# Patient Record
Sex: MALE | Race: WHITE | NOT HISPANIC OR LATINO | Employment: FULL TIME | URBAN - METROPOLITAN AREA
[De-identification: names, ages, dates, MRNs, and addresses within clinical notes are randomized per-mention and may not be internally consistent; named-entity substitution may affect disease eponyms.]

---

## 2018-05-09 ENCOUNTER — OFFICE VISIT (OUTPATIENT)
Dept: URGENT CARE | Facility: CLINIC | Age: 48
End: 2018-05-09
Payer: COMMERCIAL

## 2018-05-09 VITALS
DIASTOLIC BLOOD PRESSURE: 86 MMHG | HEART RATE: 71 BPM | OXYGEN SATURATION: 98 % | TEMPERATURE: 98 F | RESPIRATION RATE: 16 BRPM | SYSTOLIC BLOOD PRESSURE: 138 MMHG

## 2018-05-09 DIAGNOSIS — G89.29 CHRONIC PAIN OF LEFT KNEE: ICD-10-CM

## 2018-05-09 DIAGNOSIS — J30.89 SEASONAL ALLERGIC RHINITIS DUE TO OTHER ALLERGIC TRIGGER: ICD-10-CM

## 2018-05-09 DIAGNOSIS — F45.8 BRUXISM: Primary | ICD-10-CM

## 2018-05-09 DIAGNOSIS — M25.562 CHRONIC PAIN OF LEFT KNEE: ICD-10-CM

## 2018-05-09 PROCEDURE — 99212 OFFICE O/P EST SF 10 MIN: CPT | Mod: S$GLB,,, | Performed by: FAMILY MEDICINE

## 2018-05-09 RX ORDER — CYCLOBENZAPRINE HCL 5 MG
5 TABLET ORAL 3 TIMES DAILY PRN
Qty: 21 TABLET | Refills: 0 | Status: SHIPPED | OUTPATIENT
Start: 2018-05-09 | End: 2018-05-19

## 2018-05-09 NOTE — PATIENT INSTRUCTIONS
Follow up with ortho if the left knee pain is consistent   Do the exercise PT taught you for your knee   Rest, Ice, NSAIDS (like motrin) especially after exercising   Can continue the motrin for inflammation in the knee and jaw pain   Get your blood work (orders already placed by your PCP) and follow up with your PCP- call for an appointment.        TMJ Syndrome  The temporomandibular joint (TMJ) is the joint that connects your lower jaw to your head. You can feel it in front of your ears when you open and close your mouth. TMJ disorders involve chronic or recurrent pain in the joint. When treated, symptoms of TMJ disorders usually go away within a few months.  Causes  There is no widely agreed-on cause of TMJ disorders. They have been linked to injury, arthritis, chronic fatigue syndrome, and fibromyalgia. A definite connection has not been shown, though.  Symptoms  · Pain in the face, jaw, or neck  · Pain with jaw movement or chewing  · Locking or catching sensation of the jaw  · Clicking, popping, or grinding sounds with movement of the TMJ  · Headache  · Ear pain  Home care  Modest, nonsurgical treatments are a good first step toward relieving symptoms. Try the approaches described below.  · Rest the jaw by avoiding crunchy or hard-to-chew foods. Do not eat hard or sticky candies. Soft foods and liquids are easier on the jaw.  · Protect your jaw while yawning. If you need to yawn, put your fist under your chin to prevent your mouth from opening up too wide.  · To help relieve pain, try applying hot or cold packs to the painful area. Try both hot and cold to find out which works best for you. If you use hot packs (small towels soaked in hot water), be careful not to burn yourself.  · You may take acetaminophen or ibuprofen for pain, unless you were given a different pain medicine. (Note: If you have chronic liver or kidney disease or have ever had a stomach ulcer or gastrointestinal bleeding, talk with your  healthcare provider before using these medicines. Also talk to your provider if you are taking medicine to prevent blood clots.) Aspirin should never be given to anyone younger than 18 years of age who is ill with a viral infection or fever. It may cause severe liver or brain damage.  Reducing stress  If stress seems to be contributing to your symptoms, try to identify the sources of stress in your life. These arent always obvious. Common stressors include:  · Everyday hassles (which can add up), such as traffic jams, missed appointments, or car trouble  · Major life changes, both good (such as a new baby or job promotion) and bad (loss of job or loss of a loved one)  · Overload: The feeling that you have too many responsibilities and can't take care of everything at once  · Helplessness: Feeling like your problems are more than you can solve  When possible, do something about your sources of stress. See if you can avoid hassles, limit the amount of change in your life at one time, and take breaks when you feel overloaded.  Unfortunately, many stressful situations cannot be avoided. So learning how to manage stress better is very important. Getting regular exercise, eating nutritious, balanced meals, and getting adequate rest all help to make everyday stress more manageable. Certain techniques are also helpful: relaxation and breathing exercises, visualization, biofeedback, meditation, or simply taking some time out to clear your mind. For more information, talk with your healthcare provider.  Follow-up care  Follow up with your healthcare provider, or as advised. Further testing and additional treatment may be required. If changes to your lifestyle do not improve your symptoms, talk with your healthcare provider about other available therapies. These include bite guards for help with teeth grinding, stress management techniques, and more. If stress is an important factor and does not respond to the above simple  measures, talk to your doctor about a referral for stress management.  · If X-rays were done, they will be reviewed by a specialist. You will be notified of the results, especially if they affect treatment.  Call 911  Call emergency services right away if any of these occur:  · Trouble breathing or swallowing, wheezing  · Confusion  · Extreme drowsiness or trouble awakening  · Fainting or loss of consciousness  · Rapid heart rate  When to seek medical advice  Call your healthcare provider right away if any of these occur:  · Your face becomes swollen or red.  · Your pain worsens.  · You have increasing neck, mouth, tooth, or throat pain.  · You develop a fever of 100.4F (38°C) or higher  Date Last Reviewed: 7/30/2015  © 8731-2603 [employer]. [address] [place] [address], [place] [address]. All rights reserved. This information is not intended as a substitute for professional medical care. Always follow your healthcare professional's instructions.      For seasonal allergies, postnasal drip, etc  Symptomatic treatment: (consider the following unless you are allergic or cannot take the following suggestions)  Alternate Tylenol and Ibuprofen every 3 hrs for pain and fever control   For a sore throat try salt water gargles, honey/lemon water to soothe throat  Cepachol helps to numb the discomfort in throat  Cold-eeze helps to reduce the duration of Upper Respiratory Infection symptoms  Elderberry to reduce duration of URI symptoms  Nasal saline spray reduces inflammation and dryness  Warm face compresses/hot showers as often as you can to open sinuses   Vicks vapor rub at night  Flonase OTC or Nasacort OTC (nasal steroid)  Stay hydrated with increased water intake and simple foods like chicken noodle soup help hydrate and soothe the throat  Drink pedialyte, gatorade or propel.   Delsym helps with coughing at night  Zyrtec/Claritin during the day   Benadryl at night may help if allergy component   You can try  breathe right strips at night to help you breathe   A cool mist humidifier in bedroom may help with cough and relieve stuffy nose.   Zantac will help if there is reflux from the post nasal drip  REST ReSt Rest as much as you can =)    Sinus rinses DO NOT USE TAP WATER, if you must, water must be a rolling boil for 1 minute, let it cool, then use.  May use distilled water, or over the counter nasal saline rinses.  Vics vapor rub in shower to help open nasal passages.  May use nasal gel to keep passages moisturized.  May use Nasal saline sprays during the day for added relief of congestion.   For those who go to the gym, please do not use the sauna or steam room now to clear sinuses.    During pollen season, change shirt if you are outside for a while when you go in.  Also wash your face.  Do not touch your face with your hands.  Wash your hands often in general while ill, avoid face contact with hands.     Your symptoms will likely last 5-7 days, maybe longer depending on how it affects your body.  You are contagious 5-7, so minimize contact with others to reduce the spread to others and stay home from work or school as we discussed. Dehydration is preventable but is one of the main reasons why you will feel so badly.  Antibiotics are not needed unless a complication (such as Otitis Media, Bacterial sinus infection or pneumonia). Taking antibiotics for Flu/Cold is not supported by evidencebased medicine and can expose you to unnecessary side effects of the medication, such as anaphylaxis.   If you experience any:  Chest pain, shortness of breath, wheezing or difficulty breathing  Severe headache, face, neck or ear pain  New rash  Fever over 101.5º F (38.6 C) for more than three days  Confusion, behavior change or seizure  Severe weakness or dizziness  Go to ER

## 2018-05-09 NOTE — PROGRESS NOTES
"Subjective:       Patient ID: Saúl Perez is a 48 y.o. male.    Vitals:  oral temperature is 98.3 °F (36.8 °C). His blood pressure is 138/86 and his pulse is 71. His respiration is 16 and oxygen saturation is 98%.     Chief Complaint: Hoarse    Patient present to clinic with multiple complaints     c/o tongue pushing to roof of mouth, difficulty sleeping, "during the day feels like I never brush my teeth." He visited dentist 8 weeks ago. Clenching teeth at night.   Other symptoms that started hoarseness (not every day), night sweats (past couple night). He states he has read multiple Internet sites and is worried about cancer to the throat. Has hx of tobacco use and etoh use    Left knee pain "off and on", surgery 18 months ago MCL- repair       Illness   Episode onset: 3 weeks ago  Pertinent negatives include no headaches, sore throat, fever, chest pain, shortness of breath, abdominal pain, diarrhea, nausea, vomiting, joint pain or rash.     Review of Systems   Constitution: Negative for chills and fever.   HENT: Positive for hoarse voice (of and on). Negative for sore throat.    Eyes: Negative for blurred vision.   Cardiovascular: Negative for chest pain and dyspnea on exertion.   Respiratory: Negative for shortness of breath.    Skin: Negative for rash.   Musculoskeletal: Negative for back pain and joint pain.   Gastrointestinal: Negative for abdominal pain, diarrhea, nausea and vomiting.   Neurological: Negative for headaches.   Psychiatric/Behavioral: The patient is nervous/anxious.        Objective:      Physical Exam   Constitutional: He is oriented to person, place, and time. He appears well-developed and well-nourished. He is cooperative.  Non-toxic appearance. He does not appear ill. No distress.   HENT:   Head: Normocephalic and atraumatic.   Right Ear: Hearing, external ear and ear canal normal. A middle ear effusion is present.   Left Ear: Hearing, external ear and ear canal normal. A middle ear " effusion is present.   Nose: Nose normal. No mucosal edema, rhinorrhea or nasal deformity. No epistaxis.   Mouth/Throat: Uvula is midline, oropharynx is clear and moist and mucous membranes are normal. No trismus in the jaw. Normal dentition. No uvula swelling. No posterior oropharyngeal erythema.   Eyes: Conjunctivae and lids are normal. Right eye exhibits no discharge. Left eye exhibits no discharge. No scleral icterus. Right pupil is round and reactive. Left pupil is round and reactive.   Sclera clear bilat   Neck: Trachea normal, normal range of motion, full passive range of motion without pain and phonation normal. Neck supple.   Cardiovascular: Normal rate, regular rhythm, normal heart sounds, intact distal pulses and normal pulses.    Pulmonary/Chest: Effort normal and breath sounds normal. No respiratory distress.   Abdominal: Soft. Normal appearance and bowel sounds are normal. He exhibits no distension, no pulsatile midline mass and no mass. There is no tenderness.   Musculoskeletal: Normal range of motion. He exhibits no edema or deformity.        Left knee: He exhibits normal range of motion, no swelling, no effusion, no ecchymosis, no deformity, no laceration and no erythema. No tenderness found.   Neurological: He is alert and oriented to person, place, and time. He exhibits normal muscle tone. Coordination normal.   Skin: Skin is warm, dry and intact. He is not diaphoretic. No pallor.   Psychiatric: His speech is normal and behavior is normal. Judgment and thought content normal. His mood appears anxious. Cognition and memory are normal.   Nursing note and vitals reviewed.      Assessment:       1. Bruxism    2. Seasonal allergic rhinitis due to other allergic trigger    3. Chronic pain of left knee        Plan:         Bruxism  -     Ambulatory referral to ENT    Seasonal allergic rhinitis due to other allergic trigger  -     Ambulatory referral to ENT    Chronic pain of left knee    Other orders  -   "   cyclobenzaprine (FLEXERIL) 5 MG tablet; Take 1 tablet (5 mg total) by mouth 3 (three) times daily as needed for Muscle spasms.  Dispense: 21 tablet; Refill: 0    patient anxious about throat ca due to clearing his throat "excessively" and pressing his tongue to the roof of his mouth. He states he has hx tobacco use and ETOH- refer to ENT for this and TMJ/Jaw Clenching   NSAID for knee pain and jaw pain  Follow up with ortho if the left knee pain is consistent   Do the exercise PT taught you for your knee s/p MCL surgery   Rest, Ice, elevation especially after exercising for the knee  Get your blood work (orders already placed by your PCP) and follow up with your PCP- call for an appointment  CMP  Sodium   Date Value Ref Range Status   05/26/2017 143 136 - 145 mmol/L Final     Potassium   Date Value Ref Range Status   05/26/2017 4.7 3.5 - 5.1 mmol/L Final     Chloride   Date Value Ref Range Status   05/26/2017 106 95 - 110 mmol/L Final     CO2   Date Value Ref Range Status   05/26/2017 26 22 - 31 mmol/L Final     Glucose   Date Value Ref Range Status   05/26/2017 115 (H) 70 - 110 mg/dL Final     Comment:     The ADA recommends the following guidelines for fasting glucose:  Normal:       less than 100 mg/dL  Prediabetes:  100 mg/dL to 125 mg/dL  Diabetes:     126 mg/dL or higher       BUN, Bld   Date Value Ref Range Status   05/26/2017 13 9 - 21 mg/dL Final     Creatinine   Date Value Ref Range Status   05/26/2017 0.85 0.50 - 1.40 mg/dL Final     Calcium   Date Value Ref Range Status   05/26/2017 9.2 8.4 - 10.2 mg/dL Final     Total Protein   Date Value Ref Range Status   05/26/2017 6.8 6.0 - 8.4 g/dL Final     Albumin   Date Value Ref Range Status   05/26/2017 4.1 3.5 - 5.2 g/dL Final     Total Bilirubin   Date Value Ref Range Status   05/26/2017 0.5 0.2 - 1.3 mg/dL Final     Comment:     For infants and newborns, interpretation of results should be based  on gestational age, weight and in agreement with " clinical  observations.  Premature Infant recommended reference ranges:  Up to 24 hours.............<8.0 mg/dL  Up to 48 hours............<12.0 mg/dL  3-5 days..................<15.0 mg/dL  6-29 days.................<15.0 mg/dL       Alkaline Phosphatase   Date Value Ref Range Status   05/26/2017 60 38 - 145 U/L Final     AST ([place])   Date Value Ref Range Status   04/28/2016 29 17 - 59 U/L Final     AST   Date Value Ref Range Status   05/26/2017 22 17 - 59 U/L Final     ALT   Date Value Ref Range Status   05/26/2017 37 10 - 44 U/L Final     Anion Gap   Date Value Ref Range Status   05/26/2017 11 8 - 16 mmol/L Final     eGFR if    Date Value Ref Range Status   05/26/2017 >60 >60 mL/min/1.73 m^2 Final     eGFR if non    Date Value Ref Range Status   05/26/2017 >60 >60 mL/min/1.73 m^2 Final     Comment:     Calculation used to obtain the estimated glomerular filtration  rate (eGFR) is the CKD-EPI equation. Since race is unknown   in our information system, the eGFR values for   -American and Non--American patients are given   for each creatinine result.

## 2018-05-17 ENCOUNTER — OFFICE VISIT (OUTPATIENT)
Dept: OTOLARYNGOLOGY | Facility: CLINIC | Age: 48
End: 2018-05-17
Payer: COMMERCIAL

## 2018-05-17 VITALS
BODY MASS INDEX: 30.75 KG/M2 | HEIGHT: 72 IN | WEIGHT: 227.06 LBS | DIASTOLIC BLOOD PRESSURE: 84 MMHG | HEART RATE: 88 BPM | SYSTOLIC BLOOD PRESSURE: 130 MMHG

## 2018-05-17 DIAGNOSIS — F41.9 ANXIETY: ICD-10-CM

## 2018-05-17 DIAGNOSIS — Z87.891 HISTORY OF SMOKELESS TOBACCO USE: ICD-10-CM

## 2018-05-17 DIAGNOSIS — F45.8 BRUXISM: ICD-10-CM

## 2018-05-17 DIAGNOSIS — F10.20 UNCOMPLICATED ALCOHOL DEPENDENCE: ICD-10-CM

## 2018-05-17 DIAGNOSIS — R49.0 DYSPHONIA: ICD-10-CM

## 2018-05-17 DIAGNOSIS — R13.10 DYSPHAGIA, UNSPECIFIED TYPE: ICD-10-CM

## 2018-05-17 DIAGNOSIS — K21.9 LPRD (LARYNGOPHARYNGEAL REFLUX DISEASE): Primary | ICD-10-CM

## 2018-05-17 PROCEDURE — 31575 DIAGNOSTIC LARYNGOSCOPY: CPT | Mod: S$GLB,,, | Performed by: NURSE PRACTITIONER

## 2018-05-17 PROCEDURE — 99999 PR PBB SHADOW E&M-EST. PATIENT-LVL IV: CPT | Mod: PBBFAC,,, | Performed by: NURSE PRACTITIONER

## 2018-05-17 PROCEDURE — 99203 OFFICE O/P NEW LOW 30 MIN: CPT | Mod: 25,S$GLB,, | Performed by: NURSE PRACTITIONER

## 2018-05-17 RX ORDER — OMEPRAZOLE 40 MG/1
40 CAPSULE, DELAYED RELEASE ORAL
Qty: 30 CAPSULE | Refills: 11 | Status: SHIPPED | OUTPATIENT
Start: 2018-05-17 | End: 2019-06-17

## 2018-05-17 NOTE — LETTER
May 17, 2018      Juliet Moura NP  [address] [facility] [facility] [place] [address]           [place] [facility]  [address] [facility] [address] [place] [address]  Phone: [phone]  Fax: [phone]          Patient: Saúl Perez   MR Number: [id number]   YOB: 1970   Date of Visit: 5/17/2018       Dear Juliet Moura:    Thank you for referring Saúl Perez to me for evaluation. Attached you will find relevant portions of my assessment and plan of care.    If you have questions, please do not hesitate to call me. I look forward to following Saúl Perez along with you.    Sincerely,    Lore Cleveland NP    Enclosure  CC:  No Recipients    If you would like to receive this communication electronically, please contact [email or web address] or [phone] to request more information on [employer] access.    For providers and/or their staff who would like to refer a patient to [facility], please contact us through our one-stop-shop provider referral line, [facility], at [phone].    If you feel you have received this communication in error or would no longer like to receive these types of communications, please e-mail [email or web address]

## 2018-05-17 NOTE — PATIENT INSTRUCTIONS
"  How Acid Reflux Affects Your Throat    Do you have to clear your throat or cough often? Are you hoarse? Do you have trouble swallowing? If you have these or other throat symptoms, you may have acid reflux. This occurs when stomach acid flows back up and irritates your throat.  Why you have throat symptoms  There are muscles (esophageal sphincters) at both ends of the tube that carries food to your stomach (the esophagus). These muscles relax to let food pass. Then they tighten to keep stomach acid down. When the lower esophageal sphincter (LES) doesnt tighten enough, acid can flow back (reflux) from your stomach into your esophagus. This may cause heartburn. In some cases the upper esophageal sphincter (UES) also doesnt work well. Then acid can travel higher and enter your throat (pharynx). In many cases, this causes throat symptoms.  Common throat symptoms  · Need to clear your throat often  · Feeling like youre choking  · Long-term (chronic) cough  · Hoarseness  · Trouble swallowing  · Feel like you have a lump in your throat  · Sour or acid taste  · Sore throat that keeps coming back     LARYNGOPHARYNGEAL REFLUX  (SILENT OR ATYPICAL REFLUX)    If you have any of the following symptoms you may have laryngopharyngeal reflux (LPR):  hoarseness, thick or too much mucus, chronic throat pain/irritation, chronic throat clearing, chronic cough, especially cough that wake you up from sleep, chronic "postnasal drip" without the need to blow your nose.     Many people with LPR do not have symptoms of heartburn. Compared to the esophagus, the voice box and the back of the throat are significantly more sensitive to the effects of acid on surrounding tissue. Acid passing quickly through the esophagus does not have a chance to irritate the area for too long.  However acid that pools in the throat or voice box can cause prolonged irritation resulting in the symptoms of LPR.    The symptoms of LPR can consist of a dry cough " "and the sensation of something being stuck in the throat.  Some people will complain of heartburn while others may have intermittent hoarseness or loss of voice.  Another major symptom of LPR is "postnasal drip."  Patients are often told symptoms are due to abnormal nasal drainage or sinus infection; however this is rarely the cause of chronic throat irritation. For post nasal drip to cause the complaints described, signs and symptoms of an active nasal infection should be present.     Treatments for LPR include:  postural changes, weight reduction, diet modification, medication to reduce stomach acid and promote normal motility, and surgery to prevent reflux. Most patients will begin to notice some relief in her symptoms about 2 weeks after starting the medication; however it is generally recommended the medication should be continued for 2 months. If the symptoms completely resolve, the medication can then be tapered.  Some people will remain symptom free while others may have relapses which required treatment again.    Things you can do to prevent reflux include:  Do not smoke.  Smoking will cause reflux.  Avoid tight fitting clothes or belts around the waist.  Avoid eating at least 2 hours prior to bedtime.  In fact avoid eating your largest meal at night.  Weight loss.  For patient's with recent weight gain, shedding a few pounds is all that is required to improve reflux.  Avoid caffeine, cola beverages, citrus beverages, mints, alcoholic beverages, particularly at night, cheese, fried foods, spicy foods, eggs, and chocolate.  Sleep with the head of bed elevated at least 6 inches.    Recommendations:    Take Nexium / Prilosec (PPI) every morning on an empty stomach (30-60 minutes before eating) 40 MG.   At bedtime take Zantac (H2-blocker) 150-300 mg.    After 4-8 weeks, with significant symptomatic improvement, you may begin weaning your reflux medications down:  Nexium / Prilosec 40 mg --> to 20 mg " (over-the-counter strength)  Zantac 300 mg --> to 150 mg (over-the-counter stength)  See a Gastro doctor (GI) for refractory symptoms and continued management.

## 2018-06-05 PROBLEM — Z23 NEED FOR DIPHTHERIA-TETANUS-PERTUSSIS (TDAP) VACCINE: Status: ACTIVE | Noted: 2018-06-05

## 2018-09-03 ENCOUNTER — OFFICE VISIT (OUTPATIENT)
Dept: URGENT CARE | Facility: CLINIC | Age: 48
End: 2018-09-03
Payer: COMMERCIAL

## 2018-09-03 VITALS
SYSTOLIC BLOOD PRESSURE: 136 MMHG | DIASTOLIC BLOOD PRESSURE: 88 MMHG | HEART RATE: 83 BPM | RESPIRATION RATE: 14 BRPM | OXYGEN SATURATION: 97 % | TEMPERATURE: 99 F

## 2018-09-03 DIAGNOSIS — J01.00 ACUTE MAXILLARY SINUSITIS, RECURRENCE NOT SPECIFIED: Primary | ICD-10-CM

## 2018-09-03 PROCEDURE — 99214 OFFICE O/P EST MOD 30 MIN: CPT | Mod: S$GLB,,, | Performed by: FAMILY MEDICINE

## 2018-09-03 RX ORDER — AMOXICILLIN AND CLAVULANATE POTASSIUM 875; 125 MG/1; MG/1
1 TABLET, FILM COATED ORAL 2 TIMES DAILY
Qty: 20 TABLET | Refills: 0 | Status: SHIPPED | OUTPATIENT
Start: 2018-09-03 | End: 2018-09-13

## 2018-09-03 NOTE — PROGRESS NOTES
Subjective:       Patient ID: Saúl Perez is a 48 y.o. male.    Vitals:  oral temperature is 98.8 °F (37.1 °C). His blood pressure is 136/88 and his pulse is 83. His respiration is 14 and oxygen saturation is 97%.     Chief Complaint: Otalgia    Pt c/o bilateral ear congestion, started 10 days, right side tooth pain, productive cough, nasal congestion, post nasal drip,       URI    This is a new problem. The current episode started 1 to 4 weeks ago. The problem has been unchanged. Associated symptoms include congestion, coughing and ear pain. Pertinent negatives include no abdominal pain, chest pain, headaches, nausea, sore throat or wheezing. He has tried nothing for the symptoms.     Review of Systems   Constitution: Negative for chills, fever and malaise/fatigue.   HENT: Positive for congestion and ear pain. Negative for hoarse voice and sore throat.    Eyes: Negative for discharge and redness.   Cardiovascular: Negative for chest pain, dyspnea on exertion and leg swelling.   Respiratory: Positive for cough and sputum production. Negative for shortness of breath and wheezing.    Musculoskeletal: Negative for myalgias.   Gastrointestinal: Negative for abdominal pain and nausea.   Neurological: Negative for headaches.       Objective:      Physical Exam   Constitutional: He is oriented to person, place, and time. He appears well-developed and well-nourished. He is cooperative.  Non-toxic appearance. He does not appear ill. No distress.   HENT:   Head: Normocephalic and atraumatic.   Right Ear: Hearing, external ear and ear canal normal. A middle ear effusion is present.   Left Ear: Hearing, external ear and ear canal normal. A middle ear effusion is present.   Nose: No mucosal edema, rhinorrhea or nasal deformity. No epistaxis. Right sinus exhibits maxillary sinus tenderness. Right sinus exhibits no frontal sinus tenderness. Left sinus exhibits maxillary sinus tenderness. Left sinus exhibits no frontal sinus  tenderness.   Mouth/Throat: Uvula is midline, oropharynx is clear and moist and mucous membranes are normal. No trismus in the jaw. Normal dentition. No uvula swelling. No posterior oropharyngeal erythema.   Eyes: Conjunctivae and lids are normal. No scleral icterus.   Sclera clear bilat   Neck: Trachea normal, full passive range of motion without pain and phonation normal. Neck supple.   Cardiovascular: Normal rate, regular rhythm, normal heart sounds, intact distal pulses and normal pulses.   Pulmonary/Chest: Effort normal and breath sounds normal. No respiratory distress.   Abdominal: Normal appearance. He exhibits no distension. There is no tenderness.   Musculoskeletal: Normal range of motion. He exhibits no edema or deformity.   Neurological: He is alert and oriented to person, place, and time. He exhibits normal muscle tone. Coordination normal.   Skin: Skin is warm, dry and intact. He is not diaphoretic. No pallor.   Psychiatric: He has a normal mood and affect. His speech is normal and behavior is normal. Judgment and thought content normal. Cognition and memory are normal.   Nursing note and vitals reviewed.      Assessment:       1. Acute maxillary sinusitis, recurrence not specified        Plan:         Acute maxillary sinusitis, recurrence not specified    Other orders  -     amoxicillin-clavulanate 875-125mg (AUGMENTIN) 875-125 mg per tablet; Take 1 tablet by mouth 2 (two) times daily. for 10 days  Dispense: 20 tablet; Refill: 0

## 2018-09-06 ENCOUNTER — TELEPHONE (OUTPATIENT)
Dept: URGENT CARE | Facility: CLINIC | Age: 48
End: 2018-09-06

## 2019-02-28 ENCOUNTER — OFFICE VISIT (OUTPATIENT)
Dept: URGENT CARE | Facility: CLINIC | Age: 49
End: 2019-02-28
Payer: COMMERCIAL

## 2019-02-28 VITALS
DIASTOLIC BLOOD PRESSURE: 77 MMHG | BODY MASS INDEX: 31.42 KG/M2 | OXYGEN SATURATION: 97 % | HEART RATE: 89 BPM | SYSTOLIC BLOOD PRESSURE: 131 MMHG | TEMPERATURE: 97 F | HEIGHT: 72 IN | WEIGHT: 232 LBS

## 2019-02-28 DIAGNOSIS — M54.5 ACUTE BILATERAL LOW BACK PAIN, WITH SCIATICA PRESENCE UNSPECIFIED: Primary | ICD-10-CM

## 2019-02-28 PROCEDURE — 96372 THER/PROPH/DIAG INJ SC/IM: CPT | Mod: S$GLB,,, | Performed by: PHYSICIAN ASSISTANT

## 2019-02-28 PROCEDURE — 96372 PR INJECTION,THERAP/PROPH/DIAG2ST, IM OR SUBCUT: ICD-10-PCS | Mod: S$GLB,,, | Performed by: PHYSICIAN ASSISTANT

## 2019-02-28 PROCEDURE — 99214 OFFICE O/P EST MOD 30 MIN: CPT | Mod: 25,S$GLB,, | Performed by: PHYSICIAN ASSISTANT

## 2019-02-28 PROCEDURE — 99214 PR OFFICE/OUTPT VISIT, EST, LEVL IV, 30-39 MIN: ICD-10-PCS | Mod: 25,S$GLB,, | Performed by: PHYSICIAN ASSISTANT

## 2019-02-28 RX ORDER — MELOXICAM 15 MG/1
15 TABLET ORAL DAILY
Qty: 14 TABLET | Refills: 0 | Status: SHIPPED | OUTPATIENT
Start: 2019-02-28 | End: 2019-03-14

## 2019-02-28 RX ORDER — KETOROLAC TROMETHAMINE 30 MG/ML
30 INJECTION, SOLUTION INTRAMUSCULAR; INTRAVENOUS
Status: COMPLETED | OUTPATIENT
Start: 2019-02-28 | End: 2019-02-28

## 2019-02-28 RX ORDER — DEXAMETHASONE SODIUM PHOSPHATE 100 MG/10ML
10 INJECTION INTRAMUSCULAR; INTRAVENOUS
Status: COMPLETED | OUTPATIENT
Start: 2019-02-28 | End: 2019-02-28

## 2019-02-28 RX ORDER — KETOCONAZOLE 20 MG/ML
SHAMPOO, SUSPENSION TOPICAL
Refills: 5 | COMMUNITY
Start: 2019-01-15 | End: 2021-04-19

## 2019-02-28 RX ADMIN — KETOROLAC TROMETHAMINE 30 MG: 30 INJECTION, SOLUTION INTRAMUSCULAR; INTRAVENOUS at 09:02

## 2019-02-28 RX ADMIN — DEXAMETHASONE SODIUM PHOSPHATE 10 MG: 100 INJECTION INTRAMUSCULAR; INTRAVENOUS at 09:02

## 2019-02-28 NOTE — PATIENT INSTRUCTIONS
Back Pain (Acute or Chronic)    Back pain is one of the most common problems. The good news is that most people feel better in 1 to 2 weeks, and most of the rest in 1 to 2 months. Most people can remain active.  People experience and describe pain differently; not everyone is the same.  · The pain can be sharp, stabbing, shooting, aching, cramping or burning.  · Movement, standing, bending, lifting, sitting, or walking may worsen pain.  · It can be localized to one spot or area, or it can be more generalized.  · It can spread or radiate upwards, to the front, or go down your arms or legs (sciatica).  · It can cause muscle spasm.  Most of the time, mechanical problems with the muscles or spine cause the pain. Mechanical problems are usually caused by an injury to the muscles or ligaments. While illness can cause back pain, it is usually not caused by a serious illness. Mechanical problems include:   · Physical activity such as sports, exercise, work, or normal activity  · Overexertion, lifting, pushing, pulling incorrectly or too aggressively  · Sudden twisting, bending, or stretching from an accident, or accidental movement  · Poor posture  · Stretching or moving wrong, without noticing pain at the time  · Poor coordination, lack of regular exercise (check with your doctor about this)  · Spinal disc disease or arthritis  · Stress  Pain can also be related to pregnancy, or illness like appendicitis, bladder or kidney infections, pelvic infections, and many other things.  Acute back pain usually gets better in 1 to 2 weeks. Back pain related to disk disease, arthritis in the spinal joints or spinal stenosis (narrowing of the spinal canal) can become chronic and last for months or years.  Unless you had a physical injury (for example, a car accident or fall) X-rays are usually not needed for the initial evaluation of back pain. If pain continues and does not respond to medical treatment, X-rays and other tests may be  needed.  Home care  Try these home care recommendations:  · When in bed, try to find a position of comfort. A firm mattress is best. Try lying flat on your back with pillows under your knees. You can also try lying on your side with your knees bent up towards your chest and a pillow between your knees.  · At first, do not try to stretch out the sore spots. If there is a strain, it is not like the good soreness you get after exercising without an injury. In this case, stretching may make it worse.  · Avoid prolong sitting, long car rides, or travel. This puts more stress on the lower back than standing or walking.  · During the first 24 to 72 hours after an acute injury or flare up of chronic back pain, apply an ice pack to the painful area for 20 minutes and then remove it for 20 minutes. Do this over a period of 60 to 90 minutes or several times a day. This will reduce swelling and pain. Wrap the ice pack in a thin towel or plastic to protect your skin.  · You can start with ice, then switch to heat. Heat (hot shower, hot bath, or heating pad) reduces pain and works well for muscle spasms. Heat can be applied to the painful area for 20 minutes then remove it for 20 minutes. Do this over a period of 60 to 90 minutes or several times a day. Do not sleep on a heating pad. It can lead to skin burns or tissue damage.  · You can alternate ice and heat therapy. Talk with your doctor about the best treatment for your back pain.  · Therapeutic massage can help relax the back muscles without stretching them.  · Be aware of safe lifting methods and do not lift anything without stretching first.  Medicines  Talk to your doctor before using medicine, especially if you have other medical problems or are taking other medicines.  · You may use over-the-counter medicine as directed on the bottle to control pain, unless another pain medicine was prescribed. If you have chronic conditions like diabetes, liver or kidney disease,  stomach ulcers, or gastrointestinal bleeding, or are taking blood thinners, talk to your doctor before taking any medicine.  · Be careful if you are given a prescription medicines, narcotics, or medicine for muscle spasms. They can cause drowsiness, affect your coordination, reflexes, and judgement. Do not drive or operate heavy machinery.  Follow-up care  Follow up with your healthcare provider, or as advised.   A radiologist will review any X-rays that were taken. Your provide will notify you of any new findings that may affect your care.  Call 911  Call emergency services if any of the following occur:  · Trouble breathing  · Confusion  · Very drowsy or trouble awakening  · Fainting or loss of consciousness  · Rapid or very slow heart rate  · Loss of bowel or bladder control  When to seek medical advice  Call your healthcare provider right away if any of these occur:   · Pain becomes worse or spreads to your legs  · Weakness or numbness in one or both legs  · Numbness in the groin or genital area  Date Last Reviewed: 7/1/2016  © 8534-7040 [employer]. [address] [place] [address], [place] [address]. All rights reserved. This information is not intended as a substitute for professional medical care. Always follow your healthcare professional's instructions.

## 2019-02-28 NOTE — PROGRESS NOTES
Subjective:       Patient ID: Saúl Perez is a 49 y.o. male.    Vitals:  height is 6' (1.829 m) and weight is 105.2 kg (232 lb). His oral temperature is 97.2 °F (36.2 °C). His blood pressure is 131/77 and his pulse is 89. His oxygen saturation is 97%.     Chief Complaint: Tailbone Pain    XSunday, cause unknown. NO OTC medication today.      Back Pain   This is a new problem. The current episode started in the past 7 days. The pain is present in the lumbar spine. The quality of the pain is described as stabbing and aching. The pain radiates to the left thigh. The pain is at a severity of 4/10. The symptoms are aggravated by bending, lying down, sitting, standing and twisting. Pertinent negatives include no abdominal pain or pelvic pain. He has tried NSAIDs for the symptoms. The treatment provided mild relief.       Constitution: Negative for fatigue.   HENT: Negative for facial swelling and facial trauma.    Neck: Negative for neck stiffness.   Cardiovascular: Negative for chest trauma.   Eyes: Negative for eye trauma, double vision and blurred vision.   Gastrointestinal: Negative for abdominal trauma, abdominal pain and rectal bleeding.   Genitourinary: Negative for hematuria, genital trauma and pelvic pain.   Musculoskeletal: Positive for back pain and pain with walking. Negative for pain, trauma, joint swelling and abnormal ROM of joint.   Skin: Negative for color change, wound, abrasion and laceration.   Neurological: Negative for dizziness, history of vertigo, light-headedness, coordination disturbances, altered mental status and loss of consciousness.   Hematologic/Lymphatic: Negative for history of bleeding disorder.   Psychiatric/Behavioral: Negative for altered mental status.       Objective:      Physical Exam   Constitutional: He is oriented to person, place, and time. Vital signs are normal. He appears well-developed and well-nourished. He is active and cooperative. No distress.   HENT:   Head:  Normocephalic and atraumatic.   Nose: Nose normal.   Mouth/Throat: Oropharynx is clear and moist and mucous membranes are normal.   Eyes: Conjunctivae and lids are normal.   Neck: Trachea normal, normal range of motion, full passive range of motion without pain and phonation normal. Neck supple.   Cardiovascular: Normal rate, regular rhythm, normal heart sounds, intact distal pulses and normal pulses.   Pulmonary/Chest: Effort normal and breath sounds normal.   Abdominal: Soft. Normal appearance and bowel sounds are normal. He exhibits no abdominal bruit, no pulsatile midline mass and no mass.   Musculoskeletal: He exhibits no edema or deformity.   Tenderness to palpation lower lumbar facets.  Normal sensation bilateral lower extremities.  Negative straight leg raise.   Neurological: He is alert and oriented to person, place, and time. He has normal strength and normal reflexes. No sensory deficit.   Skin: Skin is warm, dry and intact. He is not diaphoretic.   Psychiatric: He has a normal mood and affect. His speech is normal and behavior is normal. Judgment and thought content normal. Cognition and memory are normal.   Nursing note and vitals reviewed.      Assessment:       1. Acute bilateral low back pain, with sciatica presence unspecified        Plan:         Acute bilateral low back pain, with sciatica presence unspecified  -     Ambulatory Referral to [facility]    Other orders  -     ketorolac injection 30 mg  -     dexamethasone injection 10 mg  -     meloxicam (MOBIC) 15 MG tablet; Take 1 tablet (15 mg total) by mouth once daily. for 14 days  Dispense: 14 tablet; Refill: 0     Patient has stated heart murmur, but denies any heart arrhythmias or other cardiac history.  Discussed risks versus benefits of steroid injection, patient would like to proceed.    You must understand that you've received an Urgent Care treatment only and that you may be released before all your medical problems are known or  treated. You, the patient, will arrange for follow up care as instructed.  Follow up with your PCP or specialty clinic as directed in the next 1-2 weeks if not improved or as needed.  You can call [phone] to schedule an appointment with the appropriate provider.  If your condition worsens we recommend that you receive another evaluation at the emergency room immediately or contact your primary medical clinics after hours call service to discuss your concerns.  Please return here or go to the Emergency Department for any concerns or worsening of condition.

## 2019-03-03 ENCOUNTER — TELEPHONE (OUTPATIENT)
Dept: URGENT CARE | Facility: CLINIC | Age: 49
End: 2019-03-03

## 2019-03-08 ENCOUNTER — OCCUPATIONAL HEALTH (OUTPATIENT)
Dept: URGENT CARE | Facility: CLINIC | Age: 49
End: 2019-03-08

## 2019-03-08 DIAGNOSIS — Z02.83 ENCOUNTER FOR DRUG SCREENING: Primary | ICD-10-CM

## 2019-03-08 PROCEDURE — 80305 OOH COLLECTION ONLY DRUG SCREEN: ICD-10-PCS | Mod: S$GLB,,, | Performed by: FAMILY MEDICINE

## 2019-03-08 PROCEDURE — 80305 DRUG TEST PRSMV DIR OPT OBS: CPT | Mod: S$GLB,,, | Performed by: FAMILY MEDICINE

## 2019-04-11 NOTE — PROGRESS NOTES
"Subjective:       Patient ID: Saúl Perez is a 48 y.o. male.    Chief Complaint: Hoarse (x 1 month); Cough; and Sore Throat    HPI   Patient is referred to ENT by Juliet Moura for "patient anxious about throat cancer due to clearing his throat excessively and pressing his tongue to the roof of his mouth. He states he has hx tobacco use and ETOH- refer to ENT for this and TMJ/Jaw Clenching."  Patient works from home, conducts conference calls daily. He finds that his voice is rather hoarse, particularly in the morning. He has a persistent cough, which is dry, for approx one month. His tongue feels swollen. He often clenches jaw/gnashes teeth. He has trouble swallowing occasionally. No mucus from nose or throat. No recent EGD or GI visit for dysphagia.   He discontinued smokeless tobacco products approx one month ago. No cigarettes. Approx 2-3 alcoholic beverages/day. H/o HPV.     Review of Systems   Constitutional: Negative.    HENT: Positive for congestion, dental problem, sore throat, trouble swallowing and voice change. Negative for facial swelling, postnasal drip, rhinorrhea, sinus pain, sinus pressure and sneezing.         Lots of throat clearing   Eyes: Negative.  Negative for discharge, redness and itching.   Respiratory: Positive for choking. Negative for cough.    Cardiovascular: Negative.    Gastrointestinal: Negative.    Musculoskeletal: Negative.    Skin: Negative.    Neurological: Negative.    Hematological: Negative.    Psychiatric/Behavioral: Negative.        Objective:      Physical Exam   Constitutional: He is oriented to person, place, and time. Vital signs are normal. He appears well-developed and well-nourished. He is cooperative. He does not appear ill. No distress.   HENT:   Head: Normocephalic and atraumatic.   Right Ear: Hearing, tympanic membrane, external ear and ear canal normal. Tympanic membrane is not erythematous. No middle ear effusion.   Left Ear: Hearing, tympanic membrane, " external ear and ear canal normal. Tympanic membrane is not erythematous.  No middle ear effusion.   Nose: Nose normal. No mucosal edema or rhinorrhea. Right sinus exhibits no maxillary sinus tenderness and no frontal sinus tenderness. Left sinus exhibits no maxillary sinus tenderness and no frontal sinus tenderness.   Mouth/Throat: Uvula is midline, oropharynx is clear and moist and mucous membranes are normal. Mucous membranes are not pale, not dry and not cyanotic. No oral lesions. No oropharyngeal exudate, posterior oropharyngeal edema or posterior oropharyngeal erythema.   Eyes: Conjunctivae, EOM and lids are normal. Pupils are equal, round, and reactive to light. Right eye exhibits no discharge. Left eye exhibits no discharge. No scleral icterus.   Neck: Trachea normal and normal range of motion. Neck supple. No tracheal deviation present. No thyroid mass and no thyromegaly present.   Cardiovascular: Normal rate.    Pulmonary/Chest: Effort normal. No stridor. No respiratory distress. He has no wheezes.   Musculoskeletal: Normal range of motion.   Lymphadenopathy:        Head (right side): No submental, no submandibular, no tonsillar, no preauricular and no posterior auricular adenopathy present.        Head (left side): No submental, no submandibular, no tonsillar, no preauricular and no posterior auricular adenopathy present.     He has no cervical adenopathy.        Right cervical: No superficial cervical and no posterior cervical adenopathy present.       Left cervical: No superficial cervical and no posterior cervical adenopathy present.   Neurological: He is alert and oriented to person, place, and time. He has normal strength. Coordination and gait normal.   Skin: Skin is warm, dry and intact. No lesion and no rash noted. He is not diaphoretic. No cyanosis. No pallor.   Psychiatric: He has a normal mood and affect. His speech is normal and behavior is normal. Judgment and thought content normal.  Cognition and memory are normal.   Nursing note and vitals reviewed.      Procedure: Flexible laryngoscopy    In order to fully examine the upper aerodigestive tract, including the larynx, in a patient with a hyperactive gag reflex, and suboptimal visualization with indirect mirror exam,  flexible endoscopy is required.   After explaining the procedure and obtaining verbal consent, a timeout was performed with the patient's participation according to the universal protocol. Both nasal cavities were anesthetized with 4% Xylocaine spray mixed with Too-Synephrine. The flexible laryngoscope  was inserted into the nasal cavity and advanced to visualize the nasal cavity, nasopharynx, the posterior oropharynx, hypopharynx, and the endolarynx with the  findings noted. The scope was removed and the procedure terminated. The patient tolerated this procedure well without apparent complication.     OVERALL FINDINGS  Nasopharynx - the torus is clear. There are no lesions of the posterior wall.   Oropharynx - no lesions of the tongue base. There is no obvious fullness or asymmetry.  Hypopharynx - there are no lesions of the pyriform sinuses or postcricoid region   Larynx - there are no lesions of the supraglottic or glottic larynx.  Vocal fold mobility is normal.     SPECIFIC FINDINGS  Adenoid tissue - normal   Nasopharynx & eustachian tube orifices - normal   Posterior pharyngeal wall - normal   Base of tongue - normal   Epiglottis - normal   Valleculae - normal   Pyriform sinuses - normal   False vocal cords - normal   True vocal cords - normal  Arytenoids - normal   Interarytenoid space - edema, erythema  Right Base of Tongue    Left Base of Tongue    Larynx    Larynx    Assessment:     25-year h/o smokeless tobacco (discontinued one month ago)    30-year h/o alcohol dependency (continued, 2-3 drinks/day)  Dysphagia   Dysphonia  LPRD/GERD  Bruxism  Anxiety  Plan:     Encouraged to continue efforts at tobacco cessation     Encouraged to continue efforts at alcohol cessation  Encouraged to see GI to discuss possible EGD for dysphagia to rule out esophageal etiology  Advised/Cautioned: The results of today's ENT exam and flexible endoscopy were detailed to the patient and her questions were answered. Patient education centered around GERD, known exacerbants and contemporary treatment options. Laryngoscope photos were given to the patient. Handouts given on LPRD and GERD were given to the patient. After review of these, patient elected to be placed on PPI 40 mg QAM on an empty stomach for the next 6-8 weeks, and H2-blocker QHS. I encouraged the patient once he has completed the evening meal to not snack or consume any other food products or caffeinated beverages for at least  minutes before retiring. Finally, I encouraged the patient to sleep about 30 degrees above horizontal, and this can be facilitated by using 2-3 pillows or a wedge foam product. If the patient is not demonstrably improved in 6-8 weeks, consultation with gastroenterology may be indicated to rule out intrinsic disease in the lower esophagus, stomach, or proximal duodenum.   Encouraged to see dentist for bruxism measures  If not entirely satisfied with voice quality in 2 months, return to see Dr. Santiago for possible video strobe.      no

## 2019-05-17 DIAGNOSIS — K21.9 LPRD (LARYNGOPHARYNGEAL REFLUX DISEASE): ICD-10-CM

## 2019-05-17 RX ORDER — OMEPRAZOLE 40 MG/1
CAPSULE, DELAYED RELEASE ORAL
Qty: 30 CAPSULE | Refills: 0 | OUTPATIENT
Start: 2019-05-17

## 2019-06-17 PROBLEM — E78.2 ELEVATED TRIGLYCERIDES WITH HIGH CHOLESTEROL: Status: ACTIVE | Noted: 2019-06-17

## 2019-06-17 PROBLEM — K21.00 GERD WITH ESOPHAGITIS: Status: ACTIVE | Noted: 2019-06-17

## 2019-06-17 PROBLEM — Z12.11 SCREENING FOR MALIGNANT NEOPLASM OF COLON: Status: ACTIVE | Noted: 2019-06-17

## 2019-07-27 DIAGNOSIS — K21.9 LPRD (LARYNGOPHARYNGEAL REFLUX DISEASE): ICD-10-CM

## 2019-10-11 ENCOUNTER — OFFICE VISIT (OUTPATIENT)
Dept: URGENT CARE | Facility: CLINIC | Age: 49
End: 2019-10-11
Payer: COMMERCIAL

## 2019-10-11 VITALS
SYSTOLIC BLOOD PRESSURE: 116 MMHG | DIASTOLIC BLOOD PRESSURE: 68 MMHG | HEIGHT: 72 IN | BODY MASS INDEX: 32.51 KG/M2 | OXYGEN SATURATION: 96 % | HEART RATE: 90 BPM | WEIGHT: 240 LBS | TEMPERATURE: 98 F

## 2019-10-11 DIAGNOSIS — J20.8 ACUTE BACTERIAL BRONCHITIS: Primary | ICD-10-CM

## 2019-10-11 DIAGNOSIS — R52 GENERALIZED BODY ACHES: ICD-10-CM

## 2019-10-11 DIAGNOSIS — B96.89 ACUTE BACTERIAL BRONCHITIS: Primary | ICD-10-CM

## 2019-10-11 DIAGNOSIS — R06.2 WHEEZING: ICD-10-CM

## 2019-10-11 DIAGNOSIS — R05.9 COUGH: ICD-10-CM

## 2019-10-11 DIAGNOSIS — F41.9 ANXIETY AND DEPRESSION: ICD-10-CM

## 2019-10-11 DIAGNOSIS — F32.A ANXIETY AND DEPRESSION: ICD-10-CM

## 2019-10-11 LAB
CTP QC/QA: YES
FLUAV AG NPH QL: NEGATIVE
FLUBV AG NPH QL: NEGATIVE

## 2019-10-11 PROCEDURE — 94640 PR INHAL RX, AIRWAY OBST/DX SPUTUM INDUCT: ICD-10-PCS | Mod: S$GLB,,, | Performed by: FAMILY MEDICINE

## 2019-10-11 PROCEDURE — 87804 POCT INFLUENZA A/B: ICD-10-PCS | Mod: 59,QW,S$GLB, | Performed by: PHYSICIAN ASSISTANT

## 2019-10-11 PROCEDURE — 3074F PR MOST RECENT SYSTOLIC BLOOD PRESSURE < 130 MM HG: ICD-10-PCS | Mod: CPTII,S$GLB,, | Performed by: PHYSICIAN ASSISTANT

## 2019-10-11 PROCEDURE — 94640 AIRWAY INHALATION TREATMENT: CPT | Mod: S$GLB,,, | Performed by: FAMILY MEDICINE

## 2019-10-11 PROCEDURE — 3008F PR BODY MASS INDEX (BMI) DOCUMENTED: ICD-10-PCS | Mod: CPTII,S$GLB,, | Performed by: PHYSICIAN ASSISTANT

## 2019-10-11 PROCEDURE — 87804 INFLUENZA ASSAY W/OPTIC: CPT | Mod: 59,QW,S$GLB, | Performed by: PHYSICIAN ASSISTANT

## 2019-10-11 PROCEDURE — 96372 PR INJECTION,THERAP/PROPH/DIAG2ST, IM OR SUBCUT: ICD-10-PCS | Mod: 59,S$GLB,, | Performed by: PHYSICIAN ASSISTANT

## 2019-10-11 PROCEDURE — 3078F DIAST BP <80 MM HG: CPT | Mod: CPTII,S$GLB,, | Performed by: PHYSICIAN ASSISTANT

## 2019-10-11 PROCEDURE — 96372 THER/PROPH/DIAG INJ SC/IM: CPT | Mod: 59,S$GLB,, | Performed by: PHYSICIAN ASSISTANT

## 2019-10-11 PROCEDURE — 3008F BODY MASS INDEX DOCD: CPT | Mod: CPTII,S$GLB,, | Performed by: PHYSICIAN ASSISTANT

## 2019-10-11 PROCEDURE — 99214 OFFICE O/P EST MOD 30 MIN: CPT | Mod: 25,S$GLB,, | Performed by: PHYSICIAN ASSISTANT

## 2019-10-11 PROCEDURE — 99214 PR OFFICE/OUTPT VISIT, EST, LEVL IV, 30-39 MIN: ICD-10-PCS | Mod: 25,S$GLB,, | Performed by: PHYSICIAN ASSISTANT

## 2019-10-11 PROCEDURE — 3074F SYST BP LT 130 MM HG: CPT | Mod: CPTII,S$GLB,, | Performed by: PHYSICIAN ASSISTANT

## 2019-10-11 PROCEDURE — 3078F PR MOST RECENT DIASTOLIC BLOOD PRESSURE < 80 MM HG: ICD-10-PCS | Mod: CPTII,S$GLB,, | Performed by: PHYSICIAN ASSISTANT

## 2019-10-11 RX ORDER — ALBUTEROL SULFATE 0.83 MG/ML
2.5 SOLUTION RESPIRATORY (INHALATION)
Status: COMPLETED | OUTPATIENT
Start: 2019-10-11 | End: 2019-10-11

## 2019-10-11 RX ORDER — BENZONATATE 100 MG/1
200 CAPSULE ORAL 3 TIMES DAILY PRN
Qty: 30 CAPSULE | Refills: 0 | Status: SHIPPED | OUTPATIENT
Start: 2019-10-11 | End: 2019-10-21

## 2019-10-11 RX ORDER — ASPIRIN 81 MG/1
TABLET ORAL
COMMUNITY

## 2019-10-11 RX ORDER — BETAMETHASONE SODIUM PHOSPHATE AND BETAMETHASONE ACETATE 3; 3 MG/ML; MG/ML
9 INJECTION, SUSPENSION INTRA-ARTICULAR; INTRALESIONAL; INTRAMUSCULAR; SOFT TISSUE
Status: COMPLETED | OUTPATIENT
Start: 2019-10-11 | End: 2019-10-11

## 2019-10-11 RX ORDER — IPRATROPIUM BROMIDE 0.5 MG/2.5ML
0.5 SOLUTION RESPIRATORY (INHALATION)
Status: COMPLETED | OUTPATIENT
Start: 2019-10-11 | End: 2019-10-11

## 2019-10-11 RX ORDER — PREDNISONE 10 MG/1
20 TABLET ORAL DAILY
Qty: 6 TABLET | Refills: 0 | Status: SHIPPED | OUTPATIENT
Start: 2019-10-11 | End: 2019-10-14

## 2019-10-11 RX ORDER — DOXYCYCLINE HYCLATE 100 MG
100 TABLET ORAL 2 TIMES DAILY
Qty: 20 TABLET | Refills: 0 | Status: SHIPPED | OUTPATIENT
Start: 2019-10-11 | End: 2019-10-21

## 2019-10-11 RX ADMIN — IPRATROPIUM BROMIDE 0.5 MG: 0.5 SOLUTION RESPIRATORY (INHALATION) at 03:10

## 2019-10-11 RX ADMIN — ALBUTEROL SULFATE 2.5 MG: 0.83 SOLUTION RESPIRATORY (INHALATION) at 03:10

## 2019-10-11 RX ADMIN — BETAMETHASONE SODIUM PHOSPHATE AND BETAMETHASONE ACETATE 9 MG: 3; 3 INJECTION, SUSPENSION INTRA-ARTICULAR; INTRALESIONAL; INTRAMUSCULAR; SOFT TISSUE at 04:10

## 2019-10-11 NOTE — PROGRESS NOTES
Subjective:       Patient ID: Saúl Perez is a 49 y.o. male.    Vitals:  height is 6' (1.829 m) and weight is 108.9 kg (240 lb). His oral temperature is 98.3 °F (36.8 °C). His blood pressure is 116/68 and his pulse is 90. His oxygen saturation is 96%.     Chief Complaint: Cough    Cough   This is a new problem. The current episode started 1 to 4 weeks ago. The problem has been unchanged. The problem occurs constantly. The cough is productive of sputum. Associated symptoms include chills, ear pain, myalgias, nasal congestion, postnasal drip and wheezing. Pertinent negatives include no chest pain, ear congestion, eye redness, fever, headaches, heartburn, hemoptysis, rash, rhinorrhea, sore throat, shortness of breath, sweats or weight loss. Nothing aggravates the symptoms. He has tried nothing for the symptoms.       Constitution: Positive for chills and fatigue. Negative for activity change, appetite change, sweating and fever.   HENT: Positive for ear pain, congestion, postnasal drip, sinus pain and sinus pressure. Negative for ear discharge, foreign body in ear, tinnitus, hearing loss, drooling, nosebleeds, foreign body in nose, sore throat, trouble swallowing and voice change.    Neck: Negative for neck pain, neck stiffness, painful lymph nodes and neck swelling.   Cardiovascular: Negative for chest pain, leg swelling, palpitations, sob on exertion and passing out.   Eyes: Negative for eye pain, eye redness, photophobia, double vision, blurred vision and eyelid swelling.   Respiratory: Positive for cough, sputum production and wheezing. Negative for chest tightness, bloody sputum, shortness of breath and stridor.    Gastrointestinal: Positive for nausea. Negative for abdominal pain, abdominal bloating, vomiting, constipation, diarrhea and heartburn.   Genitourinary: Negative for dysuria.   Musculoskeletal: Positive for muscle ache. Negative for joint pain, joint swelling, abnormal ROM of joint, back pain,  "pain with walking and muscle cramps.   Skin: Negative for rash.   Allergic/Immunologic: Positive for sneezing. Negative for seasonal allergies.   Neurological: Negative for dizziness, light-headedness, passing out, facial drooping, speech difficulty, loss of balance, headaches, altered mental status, loss of consciousness, numbness, tingling and seizures.   Hematologic/Lymphatic: Negative for swollen lymph nodes.   Psychiatric/Behavioral: Positive for nervous/anxious, sleep disturbance, substance abuse and depression. Negative for altered mental status, confusion, hallucinations, homicidal ideas, suicidal ideas and self-injury. The patient is nervous/anxious.        Objective:      Physical Exam   Constitutional: He is oriented to person, place, and time. He appears well-developed and well-nourished. He is cooperative.  Non-toxic appearance. He does not have a sickly appearance. He does not appear ill. No distress.   Patient is tearful on exam due to "personal issues".   HENT:   Head: Normocephalic and atraumatic.   Right Ear: Hearing, tympanic membrane, external ear and ear canal normal.   Left Ear: Hearing, tympanic membrane, external ear and ear canal normal.   Nose: Mucosal edema and rhinorrhea present. No nasal deformity. No epistaxis. Right sinus exhibits no maxillary sinus tenderness and no frontal sinus tenderness. Left sinus exhibits no maxillary sinus tenderness and no frontal sinus tenderness.   Mouth/Throat: Uvula is midline and mucous membranes are normal. No trismus in the jaw. Normal dentition. No uvula swelling. Posterior oropharyngeal erythema present. No oropharyngeal exudate or posterior oropharyngeal edema.   Eyes: Conjunctivae and lids are normal. No scleral icterus.   Neck: Trachea normal, normal range of motion, full passive range of motion without pain and phonation normal. Neck supple. No neck rigidity. No edema and no erythema present.   Cardiovascular: Normal rate, regular rhythm, normal " "heart sounds, intact distal pulses and normal pulses.   Pulmonary/Chest: Effort normal. No accessory muscle usage or stridor. No respiratory distress. He has no decreased breath sounds. He has wheezes in the right lower field and the left lower field. He has no rhonchi. He has no rales.   Persistent and productive cough on exam.   Abdominal: Normal appearance. There is no tenderness. There is no rigidity, no rebound, no guarding, no CVA tenderness, no tenderness at McBurney's point and negative Washington's sign. No hernia.   Musculoskeletal: Normal range of motion. He exhibits no edema or deformity.   Lymphadenopathy:     He has no cervical adenopathy.   Neurological: He is alert and oriented to person, place, and time. No cranial nerve deficit or sensory deficit. He exhibits normal muscle tone. Coordination normal.   Skin: Skin is warm, dry, intact, not diaphoretic, not pale and no rash. Capillary refill takes less than 2 seconds.   Psychiatric: He has a normal mood and affect. His speech is normal and behavior is normal. Judgment and thought content normal. Cognition and memory are normal.   Nursing note and vitals reviewed.    Post-breathing treatment: Patient reports feeling better. Re-evaluated patient and wheezing has improved on exam. Pulse Ox improved to 96%. Patient is stable, seated comfortably in NAD.    Results for orders placed or performed in visit on 10/11/19   POCT Influenza A/B   Result Value Ref Range    Rapid Influenza A Ag Negative Negative    Rapid Influenza B Ag Negative Negative     Acceptable Yes            Assessment:       1. Acute bacterial bronchitis    2. Generalized body aches    3. Cough    4. Wheezing    5. Anxiety and depression        Plan:     Discussed treatment plan of care with patient today and patient requests Psych referral. Patient currently denies suicidal and homicidal ideations. Patient is tearful on exam and requests psychiatry referral "to have someone to " "talk to about his anxiety and depression". Advised patient to have someone pick patient up due to this droggy/fatigue state at this time, but patient refused. Patient is stable, seated comfortably in NAD upon discharge. Patient aware, verbalized understanding and agreed with plan of care.    Acute bacterial bronchitis  -     doxycycline (VIBRA-TABS) 100 MG tablet; Take 1 tablet (100 mg total) by mouth 2 (two) times daily. for 10 days  Dispense: 20 tablet; Refill: 0  -     predniSONE (DELTASONE) 10 MG tablet; Take 2 tablets (20 mg total) by mouth once daily. for 3 days  Dispense: 6 tablet; Refill: 0    Generalized body aches  -     POCT Influenza A/B    Cough  -     benzonatate (TESSALON PERLES) 100 MG capsule; Take 2 capsules (200 mg total) by mouth 3 (three) times daily as needed for Cough.  Dispense: 30 capsule; Refill: 0    Wheezing  -     albuterol nebulizer solution 2.5 mg  -     ipratropium 0.02 % nebulizer solution 0.5 mg  -     albuterol nebulizer solution 2.5 mg  -     ipratropium 0.02 % nebulizer solution 0.5 mg  -     predniSONE (DELTASONE) 10 MG tablet; Take 2 tablets (20 mg total) by mouth once daily. for 3 days  Dispense: 6 tablet; Refill: 0    Anxiety and depression  -     Ambulatory referral to Psychiatry    Other orders  -     betamethasone acetate-betamethasone sodium phosphate injection 9 mg      Patient Instructions   If you were prescribed a narcotic or controlled medication, do not drive or operate heavy equipment or machinery while taking these medications.  You must understand that you've received an Urgent Care treatment only and that you may be released before all your medical problems are known or treated. You, the patient, will arrange for follow up care as instructed.  Follow up with your PCP or specialty clinic as directed if not improved or as needed. You can call [phone] to schedule an appointment with the appropriate provider.  If your condition worsens we recommend that you " receive another evaluation at the Emergency Department for any concerns or worsening of condition.  Patient aware and verbalized understanding.    You received a steroid shot today - this can elevate your blood pressure, elevate your blood sugar, water weight gain, nervous energy, redness to the face and dimpling of the skin where the shot goes in.   Patient aware, verbalized understanding and agreed with plan of care.    You tested NEGATIVE for flu today.  Patient aware and verbalized understanding.    Tessalon Perles RX as prescribed for daytime cough.  Doxycycline RX as prescribed for bronchitis.  Increase fluids and rest is important.  Avoid contact with sick individuals.   Encouraged good hand-hygiene.  Humidifier use at home.  Take OTC Tylenol or Ibuprofen every 4-6 hours as needed for fever or pain.  Follow-up with your PCP for further evaluation as needed.  ER precautions given to patient.  Patient aware and verbalizes understanding.    Bronchitis, Antibiotic Treatment (Adult)    Bronchitis is an infection of the air passages (bronchial tubes) in your lungs. It often occurs when you have a cold. This illness is contagious during the first few days and is spread through the air by coughing and sneezing, or by direct contact (touching the sick person and then touching your own eyes, nose, or mouth).  Symptoms of bronchitis include cough with mucus (phlegm) and low-grade fever. Bronchitis usually lasts 7 to 14 days. Mild cases can be treated with simple home remedies. More severe infection is treated with an antibiotic.  Home care  Follow these guidelines when caring for yourself at home:  · If your symptoms are severe, rest at home for the first 2 to 3 days. When you go back to your usual activities, don't let yourself get too tired.  · Do not smoke. Also avoid being exposed to secondhand smoke.  · You may use over-the-counter medicines to control fever or pain, unless another medicine was prescribed. (Note:  If you have chronic liver or kidney disease or have ever had a stomach ulcer or gastrointestinal bleeding, talk with your healthcare provider before using these medicines. Also talk to your provider if you are taking medicine to prevent blood clots.) Aspirin should never be given to anyone younger than 18 years of age who is ill with a viral infection or fever. It may cause severe liver or brain damage.  · Your appetite may be poor, so a light diet is fine. Avoid dehydration by drinking 6 to 8 glasses of fluids per day (such as water, soft drinks, sports drinks, juices, tea, or soup). Extra fluids will help loosen secretions in the nose and lungs.  · Over-the-counter cough, cold, and sore-throat medicines will not shorten the length of the illness, but they may be helpful to reduce symptoms. (Note: Do not use decongestants if you have high blood pressure.)  · Finish all antibiotic medicine. Do this even if you are feeling better after only a few days.  Follow-up care  Follow up with your healthcare provider, or as advised. If you had an X-ray or ECG (electrocardiogram), a specialist will review it. You will be notified of any new findings that may affect your care.  Note: If you are age 65 or older, or if you have a chronic lung disease or condition that affects your immune system, or you smoke, talk to your healthcare provider about having pneumococcal vaccinations and a yearly influenza vaccination (flu shot).  When to seek medical advice  Call your healthcare provider right away if any of these occur:  · Fever of 100.4°F (38°C) or higher  · Coughing up increased amounts of colored sputum  · Weakness, drowsiness, headache, facial pain, ear pain, or a stiff neck  Call 911, or get immediate medical care  Contact emergency services right away if any of these occur.  · Coughing up blood  · Worsening weakness, drowsiness, headache, or stiff neck  · Trouble breathing, wheezing, or pain with breathing  Date Last  Reviewed: 9/13/2015  © 9344-1425 [employer]. [address] [place] [address], [place] [address]. All rights reserved. This information is not intended as a substitute for professional medical care. Always follow your healthcare professional's instructions.

## 2019-10-11 NOTE — PATIENT INSTRUCTIONS
If you were prescribed a narcotic or controlled medication, do not drive or operate heavy equipment or machinery while taking these medications.  You must understand that you've received an Urgent Care treatment only and that you may be released before all your medical problems are known or treated. You, the patient, will arrange for follow up care as instructed.  Follow up with your PCP or specialty clinic as directed if not improved or as needed. You can call [phone] to schedule an appointment with the appropriate provider.  If your condition worsens we recommend that you receive another evaluation at the Emergency Department for any concerns or worsening of condition.  Patient aware and verbalized understanding.    You received a steroid shot today - this can elevate your blood pressure, elevate your blood sugar, water weight gain, nervous energy, redness to the face and dimpling of the skin where the shot goes in.   Patient aware, verbalized understanding and agreed with plan of care.    You tested NEGATIVE for flu today.  Patient aware and verbalized understanding.    Tessalon Perles RX as prescribed for daytime cough.  Doxycycline RX as prescribed for bronchitis.  Increase fluids and rest is important.  Avoid contact with sick individuals.   Encouraged good hand-hygiene.  Humidifier use at home.  Take OTC Tylenol or Ibuprofen every 4-6 hours as needed for fever or pain.  Follow-up with your PCP for further evaluation as needed.  ER precautions given to patient.  Patient aware and verbalizes understanding.    Bronchitis, Antibiotic Treatment (Adult)    Bronchitis is an infection of the air passages (bronchial tubes) in your lungs. It often occurs when you have a cold. This illness is contagious during the first few days and is spread through the air by coughing and sneezing, or by direct contact (touching the sick person and then touching your own eyes, nose, or mouth).  Symptoms of bronchitis include  cough with mucus (phlegm) and low-grade fever. Bronchitis usually lasts 7 to 14 days. Mild cases can be treated with simple home remedies. More severe infection is treated with an antibiotic.  Home care  Follow these guidelines when caring for yourself at home:  · If your symptoms are severe, rest at home for the first 2 to 3 days. When you go back to your usual activities, don't let yourself get too tired.  · Do not smoke. Also avoid being exposed to secondhand smoke.  · You may use over-the-counter medicines to control fever or pain, unless another medicine was prescribed. (Note: If you have chronic liver or kidney disease or have ever had a stomach ulcer or gastrointestinal bleeding, talk with your healthcare provider before using these medicines. Also talk to your provider if you are taking medicine to prevent blood clots.) Aspirin should never be given to anyone younger than 18 years of age who is ill with a viral infection or fever. It may cause severe liver or brain damage.  · Your appetite may be poor, so a light diet is fine. Avoid dehydration by drinking 6 to 8 glasses of fluids per day (such as water, soft drinks, sports drinks, juices, tea, or soup). Extra fluids will help loosen secretions in the nose and lungs.  · Over-the-counter cough, cold, and sore-throat medicines will not shorten the length of the illness, but they may be helpful to reduce symptoms. (Note: Do not use decongestants if you have high blood pressure.)  · Finish all antibiotic medicine. Do this even if you are feeling better after only a few days.  Follow-up care  Follow up with your healthcare provider, or as advised. If you had an X-ray or ECG (electrocardiogram), a specialist will review it. You will be notified of any new findings that may affect your care.  Note: If you are age 65 or older, or if you have a chronic lung disease or condition that affects your immune system, or you smoke, talk to your healthcare provider about  having pneumococcal vaccinations and a yearly influenza vaccination (flu shot).  When to seek medical advice  Call your healthcare provider right away if any of these occur:  · Fever of 100.4°F (38°C) or higher  · Coughing up increased amounts of colored sputum  · Weakness, drowsiness, headache, facial pain, ear pain, or a stiff neck  Call 911, or get immediate medical care  Contact emergency services right away if any of these occur.  · Coughing up blood  · Worsening weakness, drowsiness, headache, or stiff neck  · Trouble breathing, wheezing, or pain with breathing  Date Last Reviewed: 9/13/2015  © 8002-5497 [employer]. [address] [place] [address], [place] [address]. All rights reserved. This information is not intended as a substitute for professional medical care. Always follow your healthcare professional's instructions.

## 2019-10-14 ENCOUNTER — TELEPHONE (OUTPATIENT)
Dept: URGENT CARE | Facility: CLINIC | Age: 49
End: 2019-10-14

## 2019-12-08 ENCOUNTER — OFFICE VISIT (OUTPATIENT)
Dept: URGENT CARE | Facility: CLINIC | Age: 49
End: 2019-12-08
Payer: COMMERCIAL

## 2019-12-08 VITALS
HEIGHT: 72 IN | DIASTOLIC BLOOD PRESSURE: 81 MMHG | HEART RATE: 82 BPM | OXYGEN SATURATION: 98 % | TEMPERATURE: 98 F | BODY MASS INDEX: 31.83 KG/M2 | WEIGHT: 235 LBS | SYSTOLIC BLOOD PRESSURE: 116 MMHG | RESPIRATION RATE: 17 BRPM

## 2019-12-08 DIAGNOSIS — M54.42 BILATERAL LOW BACK PAIN WITH LEFT-SIDED SCIATICA, UNSPECIFIED CHRONICITY: Primary | ICD-10-CM

## 2019-12-08 DIAGNOSIS — R05.9 COUGH: ICD-10-CM

## 2019-12-08 PROCEDURE — 96372 PR INJECTION,THERAP/PROPH/DIAG2ST, IM OR SUBCUT: ICD-10-PCS | Mod: S$GLB,,, | Performed by: PHYSICIAN ASSISTANT

## 2019-12-08 PROCEDURE — 99214 PR OFFICE/OUTPT VISIT, EST, LEVL IV, 30-39 MIN: ICD-10-PCS | Mod: 25,S$GLB,, | Performed by: PHYSICIAN ASSISTANT

## 2019-12-08 PROCEDURE — 99214 OFFICE O/P EST MOD 30 MIN: CPT | Mod: 25,S$GLB,, | Performed by: PHYSICIAN ASSISTANT

## 2019-12-08 PROCEDURE — 96372 THER/PROPH/DIAG INJ SC/IM: CPT | Mod: S$GLB,,, | Performed by: PHYSICIAN ASSISTANT

## 2019-12-08 RX ORDER — CYCLOBENZAPRINE HCL 10 MG
10 TABLET ORAL 3 TIMES DAILY PRN
Qty: 30 TABLET | Refills: 0 | Status: SHIPPED | OUTPATIENT
Start: 2019-12-08 | End: 2020-03-26

## 2019-12-08 RX ORDER — KETOROLAC TROMETHAMINE 30 MG/ML
30 INJECTION, SOLUTION INTRAMUSCULAR; INTRAVENOUS
Status: COMPLETED | OUTPATIENT
Start: 2019-12-08 | End: 2019-12-08

## 2019-12-08 RX ORDER — BETAMETHASONE SODIUM PHOSPHATE AND BETAMETHASONE ACETATE 3; 3 MG/ML; MG/ML
9 INJECTION, SUSPENSION INTRA-ARTICULAR; INTRALESIONAL; INTRAMUSCULAR; SOFT TISSUE
Status: COMPLETED | OUTPATIENT
Start: 2019-12-08 | End: 2019-12-08

## 2019-12-08 RX ORDER — BENZONATATE 100 MG/1
200 CAPSULE ORAL 3 TIMES DAILY PRN
Qty: 30 CAPSULE | Refills: 0 | Status: SHIPPED | OUTPATIENT
Start: 2019-12-08 | End: 2019-12-18

## 2019-12-08 RX ADMIN — BETAMETHASONE SODIUM PHOSPHATE AND BETAMETHASONE ACETATE 9 MG: 3; 3 INJECTION, SUSPENSION INTRA-ARTICULAR; INTRALESIONAL; INTRAMUSCULAR; SOFT TISSUE at 12:12

## 2019-12-08 RX ADMIN — KETOROLAC TROMETHAMINE 30 MG: 30 INJECTION, SOLUTION INTRAMUSCULAR; INTRAVENOUS at 12:12

## 2019-12-08 NOTE — PATIENT INSTRUCTIONS
If you were prescribed a narcotic or controlled medication, do not drive or operate heavy equipment or machinery while taking these medications.  You must understand that you've received an Urgent Care treatment only and that you may be released before all your medical problems are known or treated. You, the patient, will arrange for follow up care as instructed.  Follow up with your PCP or specialty clinic as directed if not improved or as needed. You can call [phone] to schedule an appointment with the appropriate provider.  If your condition worsens we recommend that you receive another evaluation at the Emergency Department for any concerns or worsening of condition.  Patient aware and verbalized understanding.    You received a steroid shot today - this can elevate your blood pressure, elevate your blood sugar, water weight gain, nervous energy, redness to the face and dimpling of the skin where the shot goes in.   Patient aware, verbalized understanding and agreed with plan of care.    Tessalon Perles RX as prescribed for daytime cough.  Recommended no heavy lifting until symptoms resolve.  Toradol 30mg IM given in clinic today - YOU CAN TAKE TYLENOL IF NEEDED IN THE NEXT 4-6 HOURS IF NEEDED.    Flexeril RX as prescribed to help muscles relax - WILL CAUSE DROWSINESS, SO ONLY TAKE AT NIGHT.  You may do gently stretching if tolerable.   Moist warm compresses to area several times daily.   Advised patient to use a Heating pad at home on LOW and Warm baths with OTC Epsom Salt as discussed - MAKE SURE YOU DO NOT FALL ASLEEP WITH a HEATING PAD ON.  Do not stay in one position too long. When sleeping on your back, place a pillow under the knees to reduce tension on back. If sleeping on your side, place a pillow between the knees to keep spine in better alignment.   Wear supportive shoes such as tennis shoes for support of the neck and lower back during the day.  Follow-up with PCP for further evaluation as  needed.  Follow-up with Ortho for further evaluation if still experiencing pain as discussed.  If you lose control of any extremity, your bowel and/or bladder, in between your legs by your genitalia and/or rectum, please go to the nearest Emergency Department immediately.  Strict ER precautions given to patient.  Patient aware and verbalized understanding.    Self-Care for Low Back Pain    Most people have low back pain now and then. In many cases, it isnt serious and self-care can help. Sometimes low back pain can be a sign of a bigger problem. Call your healthcare provider if your pain returns often or gets worse over time. For the long-term care of your back, get regular exercise, lose any excess weight and learn good posture.  Take a short rest  Lying down during the day may be beneficial for short periods of time if severe pain increases with sitting or standing. Long-term bed rest could be detrimental.  Reduce pain and swelling  Cold reduces swelling. Both cold and heat can reduce pain. Protect your skin by placing a towel between your body and the ice or heat source.  · For the first few days, apply an ice pack for 15 to 20 minutes .  · After the first few days, try heat for 15 minutes at a time to ease pain. Never sleep on a heating pad.  · Over-the-counter medicine can help control pain and swelling. Try aspirin or ibuprofen.  Exercise  Exercise can help your back heal. It also helps your back get stronger and more flexible, preventing any reinjury. Ask your healthcare provider about specific exercises for your back.  Use good posture to avoid reinjury  · When moving, bend at the hips and knees. Dont bend at the waist or twist around.  · When lifting, keep the object close to your body. Dont try to lift more than you can handle.  · When sitting, keep your lower back supported. Use a rolled-up towel as needed.  Seek immediate medical care if:  · Youre unable to stand or walk.  · You have a temperature  over 100.4°F (38.0°C)  · You have frequent, painful, or bloody urination.  · You have severe abdominal pain.  · You have a sharp, stabbing pain.  · Your pain is constant.  · You have pain or numbness in your leg.  · You feel pain in a new area of your back.  · You notice that the pain isnt decreasing after more than a week.   Date Last Reviewed: 9/29/2015  © 8005-1471 [employer]. [address] [place] [address]. All rights reserved. This information is not intended as a substitute for professional medical care. Always follow your healthcare professional's instructions.

## 2019-12-08 NOTE — PROGRESS NOTES
Subjective:       Patient ID: Saúl Perez is a 49 y.o. male.    Vitals:  height is 6' (1.829 m) and weight is 106.6 kg (235 lb). His oral temperature is 98.2 °F (36.8 °C). His blood pressure is 116/81 and his pulse is 82. His respiration is 17 and oxygen saturation is 98%.     Chief Complaint: Back Pain    Back Pain   This is a recurrent problem. The current episode started in the past 7 days. The problem occurs 2 to 4 times per day. The problem is unchanged. The pain is present in the lumbar spine. The quality of the pain is described as shooting and stabbing. The pain radiates to the left foot and left thigh. The pain is at a severity of 8/10. The pain is moderate. The pain is worse during the day. The symptoms are aggravated by standing, bending and twisting. Associated symptoms include headaches, numbness and tingling. Pertinent negatives include no abdominal pain, bladder incontinence, bowel incontinence, chest pain, dysuria, fever, leg pain, paresis, paresthesias, pelvic pain, perianal numbness, weakness or weight loss. He has tried nothing for the symptoms.       Constitution: Negative for chills, sweating, fatigue and fever.   HENT: Positive for congestion, postnasal drip, sinus pain and sinus pressure. Negative for ear pain, nosebleeds, foreign body in nose, sore throat, trouble swallowing and voice change.    Neck: Negative for neck pain, neck stiffness, painful lymph nodes and neck swelling.   Cardiovascular: Negative for chest pain, leg swelling, palpitations, sob on exertion and passing out.   Eyes: Negative for eye pain, eye redness, photophobia, double vision, blurred vision and eyelid swelling.   Respiratory: Positive for cough and sputum production. Negative for chest tightness, bloody sputum, shortness of breath, stridor and wheezing.    Gastrointestinal: Negative for abdominal pain, abdominal bloating, nausea, vomiting, constipation, diarrhea and bowel incontinence.   Genitourinary: Negative  for dysuria, frequency, urgency, urine decreased, flank pain, bladder incontinence, hematuria and pelvic pain.   Musculoskeletal: Positive for pain, back pain, pain with walking and muscle ache. Negative for trauma, joint pain, joint swelling, abnormal ROM of joint, muscle cramps and history of spine disorder.   Skin: Negative for rash and hives.   Allergic/Immunologic: Negative for hives, itching and sneezing.   Neurological: Positive for headaches, numbness and tingling. Negative for dizziness, light-headedness, passing out, facial drooping, speech difficulty, loss of balance, altered mental status, loss of consciousness and seizures.   Hematologic/Lymphatic: Negative for swollen lymph nodes.   Psychiatric/Behavioral: Negative for altered mental status and nervous/anxious. The patient is not nervous/anxious.        Objective:      Physical Exam   Constitutional: He is oriented to person, place, and time. Vital signs are normal. He appears well-developed and well-nourished. He is active and cooperative.  Non-toxic appearance. He does not appear ill. No distress.   HENT:   Head: Normocephalic and atraumatic.   Right Ear: External ear normal.   Left Ear: External ear normal.   Nose: Nose normal.   Mouth/Throat: Uvula is midline and mucous membranes are normal. Posterior oropharyngeal erythema and cobblestoning present. No oropharyngeal exudate, posterior oropharyngeal edema or tonsillar abscesses. No tonsillar exudate.   Eyes: Conjunctivae and lids are normal.   Neck: Trachea normal, normal range of motion, full passive range of motion without pain and phonation normal. Neck supple.   Cardiovascular: Normal rate, regular rhythm, normal heart sounds, intact distal pulses and normal pulses.   Pulmonary/Chest: Effort normal and breath sounds normal. No accessory muscle usage or stridor. He has no decreased breath sounds. He has no wheezes. He has no rhonchi. He has no rales.   Abdominal: Soft. Normal appearance and  bowel sounds are normal. He exhibits no abdominal bruit, no pulsatile midline mass and no mass.   Musculoskeletal: He exhibits no edema or deformity.        Cervical back: Normal.        Thoracic back: Normal.        Lumbar back: He exhibits tenderness, pain and spasm. He exhibits normal range of motion, no bony tenderness, no swelling, no edema, no deformity, no laceration and normal pulse.   FROM to upper and lower extremities bilateral. 5/5 strength and full sensation bilateral. TTP over lumbar paraspinal muscles. No TTP over lumbar spinous processes. 2+ DP pulses bilateral. No pitting edema to bilateral lower extremities. Patient reports intermittent numbness/tingling down LLE. Positive straight leg raise noted to LLE. Able to ambulate without difficulty.   Lymphadenopathy:     He has no cervical adenopathy.   Neurological: He is alert and oriented to person, place, and time. He has normal strength and normal reflexes. No cranial nerve deficit or sensory deficit. He displays a negative Romberg sign. Gait normal. GCS eye subscore is 4. GCS verbal subscore is 5. GCS motor subscore is 6.   Skin: Skin is warm, dry, intact, not diaphoretic and no rash. Capillary refill takes less than 2 seconds.   Psychiatric: He has a normal mood and affect. His speech is normal and behavior is normal. Judgment and thought content normal. Cognition and memory are normal.   Nursing note and vitals reviewed.        Assessment:       1. Bilateral low back pain with left-sided sciatica, unspecified chronicity    2. Cough        Plan:         Bilateral low back pain with left-sided sciatica, unspecified chronicity  -     betamethasone acetate-betamethasone sodium phosphate injection 9 mg  -     ketorolac injection 30 mg  -     cyclobenzaprine (FLEXERIL) 10 MG tablet; Take 1 tablet (10 mg total) by mouth 3 (three) times daily as needed for Muscle spasms.  Dispense: 30 tablet; Refill: 0    Cough  -     benzonatate (TESSALON PERLES) 100 MG  capsule; Take 2 capsules (200 mg total) by mouth 3 (three) times daily as needed for Cough.  Dispense: 30 capsule; Refill: 0      Patient Instructions     If you were prescribed a narcotic or controlled medication, do not drive or operate heavy equipment or machinery while taking these medications.  You must understand that you've received an Urgent Care treatment only and that you may be released before all your medical problems are known or treated. You, the patient, will arrange for follow up care as instructed.  Follow up with your PCP or specialty clinic as directed if not improved or as needed. You can call [phone] to schedule an appointment with the appropriate provider.  If your condition worsens we recommend that you receive another evaluation at the Emergency Department for any concerns or worsening of condition.  Patient aware and verbalized understanding.    You received a steroid shot today - this can elevate your blood pressure, elevate your blood sugar, water weight gain, nervous energy, redness to the face and dimpling of the skin where the shot goes in.   Patient aware, verbalized understanding and agreed with plan of care.    Tessalon Perles RX as prescribed for daytime cough.  Recommended no heavy lifting until symptoms resolve.  Toradol 30mg IM given in clinic today - YOU CAN TAKE TYLENOL IF NEEDED IN THE NEXT 4-6 HOURS IF NEEDED.    Flexeril RX as prescribed to help muscles relax - WILL CAUSE DROWSINESS, SO ONLY TAKE AT NIGHT.  You may do gently stretching if tolerable.   Moist warm compresses to area several times daily.   Advised patient to use a Heating pad at home on LOW and Warm baths with OTC Epsom Salt as discussed - MAKE SURE YOU DO NOT FALL ASLEEP WITH a HEATING PAD ON.  Do not stay in one position too long. When sleeping on your back, place a pillow under the knees to reduce tension on back. If sleeping on your side, place a pillow between the knees to keep spine in better  alignment.   Wear supportive shoes such as tennis shoes for support of the neck and lower back during the day.  Follow-up with PCP for further evaluation as needed.  Follow-up with Ortho for further evaluation if still experiencing pain as discussed.  If you lose control of any extremity, your bowel and/or bladder, in between your legs by your genitalia and/or rectum, please go to the nearest Emergency Department immediately.  Strict ER precautions given to patient.  Patient aware and verbalized understanding.    Self-Care for Low Back Pain    Most people have low back pain now and then. In many cases, it isnt serious and self-care can help. Sometimes low back pain can be a sign of a bigger problem. Call your healthcare provider if your pain returns often or gets worse over time. For the long-term care of your back, get regular exercise, lose any excess weight and learn good posture.  Take a short rest  Lying down during the day may be beneficial for short periods of time if severe pain increases with sitting or standing. Long-term bed rest could be detrimental.  Reduce pain and swelling  Cold reduces swelling. Both cold and heat can reduce pain. Protect your skin by placing a towel between your body and the ice or heat source.  · For the first few days, apply an ice pack for 15 to 20 minutes .  · After the first few days, try heat for 15 minutes at a time to ease pain. Never sleep on a heating pad.  · Over-the-counter medicine can help control pain and swelling. Try aspirin or ibuprofen.  Exercise  Exercise can help your back heal. It also helps your back get stronger and more flexible, preventing any reinjury. Ask your healthcare provider about specific exercises for your back.  Use good posture to avoid reinjury  · When moving, bend at the hips and knees. Dont bend at the waist or twist around.  · When lifting, keep the object close to your body. Dont try to lift more than you can handle.  · When sitting, keep  your lower back supported. Use a rolled-up towel as needed.  Seek immediate medical care if:  · Youre unable to stand or walk.  · You have a temperature over 100.4°F (38.0°C)  · You have frequent, painful, or bloody urination.  · You have severe abdominal pain.  · You have a sharp, stabbing pain.  · Your pain is constant.  · You have pain or numbness in your leg.  · You feel pain in a new area of your back.  · You notice that the pain isnt decreasing after more than a week.   Date Last Reviewed: 9/29/2015  © 4941-9170 [employer]. [address] [place] [address], [place] [address]. All rights reserved. This information is not intended as a substitute for professional medical care. Always follow your healthcare professional's instructions.

## 2019-12-11 ENCOUNTER — TELEPHONE (OUTPATIENT)
Dept: URGENT CARE | Facility: CLINIC | Age: 49
End: 2019-12-11

## 2020-01-16 ENCOUNTER — HOSPITAL ENCOUNTER (OUTPATIENT)
Dept: RADIOLOGY | Facility: HOSPITAL | Age: 50
Discharge: HOME | End: 2020-01-16
Attending: NURSE PRACTITIONER
Payer: COMMERCIAL

## 2020-01-16 ENCOUNTER — OFFICE VISIT (OUTPATIENT)
Dept: OTOLARYNGOLOGY | Facility: CLINIC | Age: 50
End: 2020-01-16
Payer: COMMERCIAL

## 2020-01-16 VITALS — WEIGHT: 244.69 LBS | BODY MASS INDEX: 33.14 KG/M2 | HEIGHT: 72 IN

## 2020-01-16 DIAGNOSIS — K21.9 LPRD (LARYNGOPHARYNGEAL REFLUX DISEASE): ICD-10-CM

## 2020-01-16 DIAGNOSIS — R09.A2 GLOBUS SENSATION: ICD-10-CM

## 2020-01-16 DIAGNOSIS — R09.89 CHRONIC THROAT CLEARING: ICD-10-CM

## 2020-01-16 DIAGNOSIS — R05.3 PERSISTENT COUGH FOR 3 WEEKS OR LONGER: Primary | ICD-10-CM

## 2020-01-16 DIAGNOSIS — R13.10 DYSPHAGIA, UNSPECIFIED TYPE: ICD-10-CM

## 2020-01-16 DIAGNOSIS — R05.3 PERSISTENT COUGH FOR 3 WEEKS OR LONGER: ICD-10-CM

## 2020-01-16 DIAGNOSIS — R49.0 DYSPHONIA: ICD-10-CM

## 2020-01-16 PROCEDURE — 71046 XR CHEST PA AND LATERAL: ICD-10-PCS | Mod: 26,,, | Performed by: RADIOLOGY

## 2020-01-16 PROCEDURE — 3008F PR BODY MASS INDEX (BMI) DOCUMENTED: ICD-10-PCS | Mod: CPTII,S$GLB,, | Performed by: NURSE PRACTITIONER

## 2020-01-16 PROCEDURE — 99999 PR PBB SHADOW E&M-EST. PATIENT-LVL IV: CPT | Mod: PBBFAC,,, | Performed by: NURSE PRACTITIONER

## 2020-01-16 PROCEDURE — 70220 X-RAY EXAM OF SINUSES: CPT | Mod: 26,,, | Performed by: RADIOLOGY

## 2020-01-16 PROCEDURE — 71046 X-RAY EXAM CHEST 2 VIEWS: CPT | Mod: 26,,, | Performed by: RADIOLOGY

## 2020-01-16 PROCEDURE — 99214 PR OFFICE/OUTPT VISIT, EST, LEVL IV, 30-39 MIN: ICD-10-PCS | Mod: S$GLB,,, | Performed by: NURSE PRACTITIONER

## 2020-01-16 PROCEDURE — 70220 XR SINUSES MIN 3 VIEWS: ICD-10-PCS | Mod: 26,,, | Performed by: RADIOLOGY

## 2020-01-16 PROCEDURE — 70220 X-RAY EXAM OF SINUSES: CPT | Mod: TC,FY,PO

## 2020-01-16 PROCEDURE — 71046 X-RAY EXAM CHEST 2 VIEWS: CPT | Mod: TC,FY,PO

## 2020-01-16 PROCEDURE — 3008F BODY MASS INDEX DOCD: CPT | Mod: CPTII,S$GLB,, | Performed by: NURSE PRACTITIONER

## 2020-01-16 PROCEDURE — 99214 OFFICE O/P EST MOD 30 MIN: CPT | Mod: S$GLB,,, | Performed by: NURSE PRACTITIONER

## 2020-01-16 PROCEDURE — 99999 PR PBB SHADOW E&M-EST. PATIENT-LVL IV: ICD-10-PCS | Mod: PBBFAC,,, | Performed by: NURSE PRACTITIONER

## 2020-01-16 RX ORDER — AMOXICILLIN AND CLAVULANATE POTASSIUM 875; 125 MG/1; MG/1
1 TABLET, FILM COATED ORAL 2 TIMES DAILY
Qty: 24 TABLET | Refills: 0 | Status: SHIPPED | OUTPATIENT
Start: 2020-01-16 | End: 2020-01-28

## 2020-01-16 RX ORDER — OMEPRAZOLE 40 MG/1
40 CAPSULE, DELAYED RELEASE ORAL
Qty: 30 CAPSULE | Refills: 11 | Status: SHIPPED | OUTPATIENT
Start: 2020-01-16 | End: 2020-09-25

## 2020-01-16 NOTE — PATIENT INSTRUCTIONS
Your last ENT visit in May 2018:   We encouraged you to continue efforts at tobacco cessation, we encouraged you to continue efforts at alcohol cessation. We encouraged you to see your GI specialist to discuss possible EGD for dysphagia to rule out esophageal etiology. We reviewed the results of your exam and flexible endoscopy were detailed. Patient education centered around GERD, known exacerbants and contemporary treatment options. Laryngoscope photos were reviewed. Handouts were given on LPRD and GERD. Medications were given.   If the patient is not demonstrably improved in 6-8 weeks, consultation with gastroenterology may be indicated to rule out intrinsic disease in the lower esophagus, stomach, or proximal duodenum.   Encouraged to see dentist for bruxism measures  If not entirely satisfied with voice quality in 2 months, return to see Dr. Santiago for possible video strobe.          Some of the Top Considerations for Chronic Cough:     1. Nasal allergies -- Typical constellation of symptoms seen with nasal allergies: itchy, red, watery eyes; itchy, red, watery nose; excessive sneezing; excessive stuffiness. If this one best describes your current state, then discuss with your primary care provider whether you should see an allergist or take daily allergy medications.     2. Sinus Infection -- Typical constellation of symptoms seen with acute bacterial sinus infection are:  Green-gold, foul-smelling, foul-tasting mucus from nose and throat, inability to breathe through nose, inability to smell or taste well, facial pain and swelling, dental pain, headaches around eyes, sore throat and productive cough. If this one best describes your current state, then let's get sinus imaging to rule out infection.     3.  Silent reflux -- Typical constellation of symptoms seen with silent reflux: post-nasal drip sensation with absence of significant runny nose or nasal congestion, sensation of thick or too much mucus in the  back of throat, raspy voice, frequent throat clearing, lump in the back of throat, frequent sore throats. If this one best describes your current state, discuss with your primary care provider whether you should see a gastroenterologist or take daily reflux medications. Your GI doctor may want to do an Upper GI or obtain a barium swallow or pH monitoring test.     4. Asthma/Pulmonary (Lung) issue -- Typical constellation of symptoms: wheezing, shortness of breath. Discuss with your primary care provider whether you should see a pulmonologist (lung specialist) and have Pulmonary Function Testing (PFTs) done.     5. Certain blood pressure medications known as ACE-inhibitors, such as lisinopril, can cause chronic cough. Though estimates vary in literature, 20% or more of patients taking lisinopril (or other ACE-inhibitor for blood pressure) will develop a chronic dry cough.     6. Post-Viral Cough Syndrome -- Occasionally a cough can persist for 4-8 weeks after an acute upper respiratory viral illness, due to hyperactive sensitivity of the airway nerves. A steroid inhaler and night-time cough suppressant can often help.     7. Pertussis -- Pertussis is a under-recognized cause of persistent cough in adults. A blood test can check for Bordetella.

## 2020-01-16 NOTE — PROGRESS NOTES
Subjective:       Patient ID: Saúl Perez is a 49 y.o. male.    Chief Complaint: Cough and Other (congestion/trouble breathing)    Cough   Associated symptoms include postnasal drip and a sore throat. Pertinent negatives include no eye redness or rhinorrhea.    Patient returns today for recurrent cough since Sept. He has been to [facility] monthly for the past 3 months.  Received doxycycline, prednisone, cough suppressants.  Patient works from home, conducts conference calls daily. He finds that his voice becomes hoarse easily. He has a persistent cough, which is dry. His mouth feels dry. Some mucus from nose and throat. N  He discontinued tobacco use. 3 alcoholic beverages/day. H/o HPV. He states he is always a bit anxious about throat cancer.     Review of Systems   Constitutional: Negative.    HENT: Positive for congestion, postnasal drip, sneezing, sore throat, trouble swallowing and voice change. Negative for dental problem, facial swelling, rhinorrhea, sinus pressure and sinus pain.         Lots of throat clearing   Eyes: Negative.  Negative for discharge, redness and itching.   Respiratory: Positive for cough and choking.    Cardiovascular: Negative.    Gastrointestinal: Negative.    Musculoskeletal: Negative.    Skin: Negative.    Neurological: Negative.    Hematological: Negative.    Psychiatric/Behavioral: Negative.        Objective:      Physical Exam   Constitutional: He is oriented to person, place, and time. Vital signs are normal. He appears well-developed and well-nourished. He is cooperative. He does not appear ill. No distress.   HENT:   Head: Normocephalic and atraumatic.   Right Ear: Hearing, tympanic membrane, external ear and ear canal normal. Tympanic membrane is not erythematous. No middle ear effusion.   Left Ear: Hearing, tympanic membrane, external ear and ear canal normal. Tympanic membrane is not erythematous.  No middle ear effusion.   Nose: Nose normal. No mucosal edema or rhinorrhea.  Right sinus exhibits no maxillary sinus tenderness and no frontal sinus tenderness. Left sinus exhibits no maxillary sinus tenderness and no frontal sinus tenderness.   Mouth/Throat: Uvula is midline, oropharynx is clear and moist and mucous membranes are normal. Mucous membranes are not pale, not dry and not cyanotic. No oral lesions. No oropharyngeal exudate, posterior oropharyngeal edema or posterior oropharyngeal erythema.   Eyes: Pupils are equal, round, and reactive to light. Conjunctivae, EOM and lids are normal. Right eye exhibits no discharge. Left eye exhibits no discharge. No scleral icterus.   Neck: Trachea normal and normal range of motion. Neck supple. No tracheal deviation present. No thyroid mass and no thyromegaly present.   Cardiovascular: Normal rate.   Pulmonary/Chest: Effort normal. No stridor. No respiratory distress. He has no wheezes.   Musculoskeletal: Normal range of motion.   Lymphadenopathy:        Head (right side): No submental, no submandibular, no tonsillar, no preauricular and no posterior auricular adenopathy present.        Head (left side): No submental, no submandibular, no tonsillar, no preauricular and no posterior auricular adenopathy present.     He has no cervical adenopathy.        Right cervical: No superficial cervical and no posterior cervical adenopathy present.       Left cervical: No superficial cervical and no posterior cervical adenopathy present.   Neurological: He is alert and oriented to person, place, and time. He has normal strength. Coordination and gait normal.   Skin: Skin is warm, dry and intact. No lesion and no rash noted. He is not diaphoretic. No cyanosis. No pallor.   Psychiatric: He has a normal mood and affect. His speech is normal and behavior is normal. Judgment and thought content normal. Cognition and memory are normal.   Nursing note and vitals reviewed.      Assessment:     Persistent cough X 3 months, on lisinopril    LPRD/GERD manifested as  dysphonia, chronic throat clearing, dysphagia, throat discomfort, choking, and globus sensation.  Anxiety  Plan:     Discussed typical constellation of symptoms seen with acute allergic rhinitis exacerbation, acute bacterial sinusitis, acute bacterial pharyngitis/tonsillitis, and LPRD/GERD.    Discussed common differentials for chronic cough may include but not limited to: allergic rhinitis (see allergist or take daily allergy meds), silent reflux (see GI or take daily reflux meds), sinusitis (imaging today), pulmonary issues (see pulmonologist), ACEi (lisinopril).     Recommend resume omeprazole (refilled).   Sinus & chest x-rays today -- discussed may call in antibiotic depending on x-ray results.  He may need to talk to his prescriber about changing lisinopril

## 2020-02-17 ENCOUNTER — TELEPHONE (OUTPATIENT)
Dept: ORTHOPEDICS | Facility: CLINIC | Age: 50
End: 2020-02-17

## 2020-02-17 DIAGNOSIS — M25.562 LEFT KNEE PAIN, UNSPECIFIED CHRONICITY: Primary | ICD-10-CM

## 2020-02-17 NOTE — TELEPHONE ENCOUNTER
"Called pt. He made a my [facility] appt to see Ms. EVITA Song and put in comments "cannot walk". Need to clarify what pt meant by that? Pt states that he has had previous scopes on left knee. His left knee is hurting now, he is having a hard time walking due to the pain. Last surgery about 3 yrs ago. No recent films. Xrays ordered. Thanks, Arielle  "

## 2020-02-18 ENCOUNTER — TELEPHONE (OUTPATIENT)
Dept: ORTHOPEDICS | Facility: CLINIC | Age: 50
End: 2020-02-18

## 2020-02-18 NOTE — TELEPHONE ENCOUNTER
Left message on voicemail for pt to call back. Does he want to come in today and be seen by Dr. Eaton? Thanks, Arielle

## 2020-02-19 ENCOUNTER — HOSPITAL ENCOUNTER (OUTPATIENT)
Dept: RADIOLOGY | Facility: HOSPITAL | Age: 50
Discharge: HOME | End: 2020-02-19
Attending: NURSE PRACTITIONER
Payer: COMMERCIAL

## 2020-02-19 ENCOUNTER — OFFICE VISIT (OUTPATIENT)
Dept: ORTHOPEDICS | Facility: CLINIC | Age: 50
End: 2020-02-19
Payer: COMMERCIAL

## 2020-02-19 VITALS
HEIGHT: 72 IN | DIASTOLIC BLOOD PRESSURE: 93 MMHG | SYSTOLIC BLOOD PRESSURE: 134 MMHG | WEIGHT: 244.69 LBS | HEART RATE: 117 BPM | BODY MASS INDEX: 33.14 KG/M2

## 2020-02-19 DIAGNOSIS — M17.12 PRIMARY OSTEOARTHRITIS OF LEFT KNEE: Primary | ICD-10-CM

## 2020-02-19 DIAGNOSIS — M25.562 LEFT KNEE PAIN, UNSPECIFIED CHRONICITY: ICD-10-CM

## 2020-02-19 PROCEDURE — 99999 PR PBB SHADOW E&M-EST. PATIENT-LVL III: ICD-10-PCS | Mod: PBBFAC,,, | Performed by: NURSE PRACTITIONER

## 2020-02-19 PROCEDURE — 3008F PR BODY MASS INDEX (BMI) DOCUMENTED: ICD-10-PCS | Mod: CPTII,S$GLB,, | Performed by: NURSE PRACTITIONER

## 2020-02-19 PROCEDURE — 3080F DIAST BP >= 90 MM HG: CPT | Mod: CPTII,S$GLB,, | Performed by: NURSE PRACTITIONER

## 2020-02-19 PROCEDURE — 99999 PR PBB SHADOW E&M-EST. PATIENT-LVL III: CPT | Mod: PBBFAC,,, | Performed by: NURSE PRACTITIONER

## 2020-02-19 PROCEDURE — 73562 XR KNEE ORTHO BILAT: ICD-10-PCS | Mod: 26,50,, | Performed by: RADIOLOGY

## 2020-02-19 PROCEDURE — 3080F PR MOST RECENT DIASTOLIC BLOOD PRESSURE >= 90 MM HG: ICD-10-PCS | Mod: CPTII,S$GLB,, | Performed by: NURSE PRACTITIONER

## 2020-02-19 PROCEDURE — 99203 OFFICE O/P NEW LOW 30 MIN: CPT | Mod: 25,S$GLB,, | Performed by: NURSE PRACTITIONER

## 2020-02-19 PROCEDURE — 3075F PR MOST RECENT SYSTOLIC BLOOD PRESS GE 130-139MM HG: ICD-10-PCS | Mod: CPTII,S$GLB,, | Performed by: NURSE PRACTITIONER

## 2020-02-19 PROCEDURE — 73562 X-RAY EXAM OF KNEE 3: CPT | Mod: TC,50,PO

## 2020-02-19 PROCEDURE — 20610 LARGE JOINT ASPIRATION/INJECTION: L KNEE: ICD-10-PCS | Mod: LT,S$GLB,, | Performed by: NURSE PRACTITIONER

## 2020-02-19 PROCEDURE — 3008F BODY MASS INDEX DOCD: CPT | Mod: CPTII,S$GLB,, | Performed by: NURSE PRACTITIONER

## 2020-02-19 PROCEDURE — 73562 X-RAY EXAM OF KNEE 3: CPT | Mod: 26,50,, | Performed by: RADIOLOGY

## 2020-02-19 PROCEDURE — 3075F SYST BP GE 130 - 139MM HG: CPT | Mod: CPTII,S$GLB,, | Performed by: NURSE PRACTITIONER

## 2020-02-19 PROCEDURE — 20610 DRAIN/INJ JOINT/BURSA W/O US: CPT | Mod: LT,S$GLB,, | Performed by: NURSE PRACTITIONER

## 2020-02-19 PROCEDURE — 99203 PR OFFICE/OUTPT VISIT, NEW, LEVL III, 30-44 MIN: ICD-10-PCS | Mod: 25,S$GLB,, | Performed by: NURSE PRACTITIONER

## 2020-02-19 RX ORDER — LISINOPRIL 30 MG/1
30 TABLET ORAL DAILY
COMMUNITY
Start: 2020-02-09 | End: 2020-11-23

## 2020-02-19 RX ORDER — MELOXICAM 15 MG/1
15 TABLET ORAL DAILY
Qty: 30 TABLET | Refills: 3 | Status: SHIPPED | OUTPATIENT
Start: 2020-02-19 | End: 2021-04-19

## 2020-02-19 RX ORDER — TRIAMCINOLONE ACETONIDE 40 MG/ML
40 INJECTION, SUSPENSION INTRA-ARTICULAR; INTRAMUSCULAR
Status: DISCONTINUED | OUTPATIENT
Start: 2020-02-19 | End: 2020-02-19 | Stop reason: HOSPADM

## 2020-02-19 RX ADMIN — TRIAMCINOLONE ACETONIDE 40 MG: 40 INJECTION, SUSPENSION INTRA-ARTICULAR; INTRAMUSCULAR at 01:02

## 2020-02-19 NOTE — LETTER
February 19, 2020      Kartik Ibarra, DO  [address]  [address]  [place] [address]           [facility] [address] [place] [address]  Phone: [phone]          Patient: Saúl Perez   MR Number: [id number]   YOB: 1970   Date of Visit: 2/19/2020       Dear Dr. Kartik Ibarra:    Thank you for referring Saúl Perez to me for evaluation. Attached you will find relevant portions of my assessment and plan of care.    If you have questions, please do not hesitate to call me. I look forward to following Saúl Perez along with you.    Sincerely,    Ekta Song, FELICE    Enclosure  CC:  No Recipients    If you would like to receive this communication electronically, please contact [email or web address] or [phone] to request more information on [employer] Link access.    For providers and/or their staff who would like to refer a patient to [facility], please contact us through our one-stop-shop provider referral line, Corky Benítez, at [phone].    If you feel you have received this communication in error or would no longer like to receive these types of communications, please e-mail [email or web address]

## 2020-02-19 NOTE — PROCEDURES
Large Joint Aspiration/Injection: L knee  Performed by: Ekta Song NP  Authorized by: Ekta Song NP  Date/Time: 2/19/2020 1:45 PM    Consent Done?:  Yes (Verbal)  Indications:  pain  Timeout: Immediately prior to procedure a time out was called to verify the correct patient, procedure, equipment, support staff and site/side marked as required.  Prep:Patient was prepped and draped in the usual sterile fashion.  Procedure site marked: Yes     Anesthesia  Local anesthesia used  Anesthetic: lidocaine 1% without epinephrine  Anesthetic total: 5mL    Details:   Needle size: 21 G   Approach: anterolateral  Location:  Knee  Site:  L knee    Medications: 40 mg triamcinolone acetonide 40 mg/mL  Patient tolerance:  patient tolerated the procedure well with no immediate complications

## 2020-02-19 NOTE — PROGRESS NOTES
Chief Complaint   Patient presents with    Right Knee - Pain    Left Knee - Pain         HPI:   This is a 50 y.o. who presents to clinic today complaining of intermittent bilateral knee pain for past few years after no known trauma. Pain is progressively worsening on the left. No numbness or tingling. No associated signs or symptoms.    Past Medical History:   Diagnosis Date    Anxiety     Chicken pox     History of alcohol abuse      Past Surgical History:   Procedure Laterality Date    COLONOSCOPY W/ POLYPECTOMY  07/01/2019        ESOPHAGOGASTRODUODENOSCOPY      HERNIA REPAIR      KNEE SURGERY      left knee scope    lipomas removed       Current Outpatient Medications on File Prior to Visit   Medication Sig Dispense Refill    ketoconazole (NIZORAL) 2 % shampoo LATHER ON SCALP. LET SIT FOR 5 MIN B RINSING  5    lisinopril (PRINIVIL,ZESTRIL) 30 MG tablet Take 30 mg by mouth once daily.       metFORMIN (GLUCOPHAGE) 500 MG tablet Tab 1 po once daily. 90 tablet 4    omega-3 acid ethyl esters (LOVAZA) 1 gram capsule Take 2 capsules (2 g total) by mouth 2 (two) times daily. 360 capsule 4    omeprazole (PRILOSEC) 40 MG capsule Take 1 capsule (40 mg total) by mouth before breakfast. Take on empty stomach 30-60 minutes before meal 30 capsule 11    traZODone (DESYREL) 100 MG tablet Take 1 tablet (100 mg total) by mouth every evening. 90 tablet 3    aspirin (ECOTRIN) 81 MG EC tablet aspirin 81 mg tablet,delayed release      cyclobenzaprine (FLEXERIL) 10 MG tablet Take 1 tablet (10 mg total) by mouth 3 (three) times daily as needed for Muscle spasms. (Patient not taking: Reported on 1/16/2020) 30 tablet 0    imiquimod (ALDARA) 5 % cream Apply topically 3 (three) times a week. (Patient not taking: Reported on 10/11/2019) 12 packet 1    sertraline (ZOLOFT) 100 MG tablet TAKE 1 TABLET(100 MG) BY MOUTH EVERY DAY (Patient not taking: Reported on 10/11/2019) 90 tablet 0    tetrahydrozoline 0.05%  (VISINE) 0.05 % Drop Visine 0.05 % eye drops      [DISCONTINUED] lisinopril (PRINIVIL,ZESTRIL) 20 MG tablet Take 1 tablet (20 mg total) by mouth once daily. 90 tablet 4     No current facility-administered medications on file prior to visit.      Review of patient's allergies indicates:   Allergen Reactions    No known drug allergies      Family History   Adopted: Yes     Social History     Socioeconomic History    Marital status:      Spouse name: Not on file    Number of children: Not on file    Years of education: Not on file    Highest education level: Not on file   Occupational History    Not on file   Social Needs    Financial resource strain: Not on file    Food insecurity:     Worry: Not on file     Inability: Not on file    Transportation needs:     Medical: Not on file     Non-medical: Not on file   Tobacco Use    Smoking status: Never Smoker    Smokeless tobacco: Never Used   Substance and Sexual Activity    Alcohol use: Yes     Comment: occasional    Drug use: Not on file    Sexual activity: Not on file   Lifestyle    Physical activity:     Days per week: Not on file     Minutes per session: Not on file    Stress: Not on file   Relationships    Social connections:     Talks on phone: Not on file     Gets together: Not on file     Attends Advent service: Not on file     Active member of club or organization: Not on file     Attends meetings of clubs or organizations: Not on file     Relationship status: Not on file   Other Topics Concern    Not on file   Social History Narrative    Not on file       Review of Systems:  Constitutional:  Denies fever or chills   Eyes:  Denies change in visual acuity   HENT:  Denies nasal congestion or sore throat   Respiratory:  Denies cough or shortness of breath   Cardiovascular:  Denies chest pain or edema   GI:  Denies abdominal pain, nausea, vomiting, bloody stools or diarrhea   :  Denies dysuria   Integument:  Denies rash   Neurologic:   Denies headache, focal weakness or sensory changes   Endocrine:  Denies polyuria or polydipsia   Lymphatic:  Denies swollen glands   Psychiatric:  Denies depression or anxiety     Physical Exam:   Constitutional:  Well developed, well nourished, no acute distress, non-toxic appearance   Integument:  Well hydrated, no rash   Lymphatic:  No lymphadenopathy noted   Neurologic:  Alert & oriented x 3  Psychiatric:  Speech and behavior appropriate   Eyes: EOMI  Gi: abdomen soft    Bilateral Knee Exam    left Knee Exam     Tenderness   The patient is experiencing tenderness in the medial joint line.    Range of Motion   Extension: abnormal   Flexion: abnormal     Muscle Strength     The patient has normal knee strength.    Tests   Kaleigh:  Medial - positive   Lachman:  Anterior - negative      Varus: negative  Valgus: negative  Patellar Apprehension: negative    Other   Erythema: absent  Sensation: normal  Pulse: present  Swelling: mild      right Knee Exam   right knee exam performed same as contralateral side and is normal.        X-rays were performed, personally reviewed by me and findings discussed with the patient.  4 views of the bilateral knee show tricompartmental degenerative change most pronounced in the medial compartment    There are no diagnoses linked to this encounter.        Treatment options discussed, including PT for quad strengthening. Will discontinue Mobic if GERD worsens. Using an aseptic technique, I injected 5 cc of lidocaine 1% without and 1 cc of kenalog 40mg into the left knee. The patient tolerated this well. I will have them return to clinic as needed.

## 2020-03-26 PROBLEM — Z00.00 WELL ADULT EXAM: Status: ACTIVE | Noted: 2020-03-26

## 2020-03-26 PROBLEM — F33.1 MODERATE EPISODE OF RECURRENT MAJOR DEPRESSIVE DISORDER: Status: ACTIVE | Noted: 2020-03-26

## 2020-03-26 PROBLEM — I10 MODERATE HYPERTENSION: Status: ACTIVE | Noted: 2020-03-26

## 2020-03-26 PROBLEM — Z79.899 ENCOUNTER FOR LONG-TERM (CURRENT) USE OF MEDICATIONS: Status: ACTIVE | Noted: 2020-03-26

## 2020-03-26 PROBLEM — F51.04 PSYCHOPHYSIOLOGICAL INSOMNIA: Status: ACTIVE | Noted: 2020-03-26

## 2020-03-26 PROBLEM — Z12.5 SCREENING FOR PROSTATE CANCER: Status: ACTIVE | Noted: 2020-03-26

## 2020-06-29 PROBLEM — Z00.00 WELL ADULT EXAM: Status: RESOLVED | Noted: 2020-03-26 | Resolved: 2020-06-29

## 2020-09-01 ENCOUNTER — OFFICE VISIT (OUTPATIENT)
Dept: ORTHOPEDICS | Facility: CLINIC | Age: 50
End: 2020-09-01
Payer: COMMERCIAL

## 2020-09-01 VITALS
WEIGHT: 239.19 LBS | HEART RATE: 80 BPM | DIASTOLIC BLOOD PRESSURE: 88 MMHG | HEIGHT: 72 IN | BODY MASS INDEX: 32.4 KG/M2 | SYSTOLIC BLOOD PRESSURE: 131 MMHG

## 2020-09-01 DIAGNOSIS — R20.0 LEFT LEG NUMBNESS: Primary | ICD-10-CM

## 2020-09-01 DIAGNOSIS — M17.12 PRIMARY OSTEOARTHRITIS OF LEFT KNEE: ICD-10-CM

## 2020-09-01 DIAGNOSIS — M17.12 PRIMARY OSTEOARTHRITIS OF LEFT KNEE: Primary | ICD-10-CM

## 2020-09-01 DIAGNOSIS — I10 MODERATE HYPERTENSION: ICD-10-CM

## 2020-09-01 PROCEDURE — 3008F BODY MASS INDEX DOCD: CPT | Mod: CPTII,S$GLB,, | Performed by: FAMILY MEDICINE

## 2020-09-01 PROCEDURE — 3079F DIAST BP 80-89 MM HG: CPT | Mod: CPTII,S$GLB,, | Performed by: FAMILY MEDICINE

## 2020-09-01 PROCEDURE — 99214 PR OFFICE/OUTPT VISIT, EST, LEVL IV, 30-39 MIN: ICD-10-PCS | Mod: S$GLB,,, | Performed by: FAMILY MEDICINE

## 2020-09-01 PROCEDURE — 99214 OFFICE O/P EST MOD 30 MIN: CPT | Mod: S$GLB,,, | Performed by: FAMILY MEDICINE

## 2020-09-01 PROCEDURE — 3008F PR BODY MASS INDEX (BMI) DOCUMENTED: ICD-10-PCS | Mod: CPTII,S$GLB,, | Performed by: FAMILY MEDICINE

## 2020-09-01 PROCEDURE — 99999 PR PBB SHADOW E&M-EST. PATIENT-LVL V: ICD-10-PCS | Mod: PBBFAC,,, | Performed by: FAMILY MEDICINE

## 2020-09-01 PROCEDURE — 99999 PR PBB SHADOW E&M-EST. PATIENT-LVL V: CPT | Mod: PBBFAC,,, | Performed by: FAMILY MEDICINE

## 2020-09-01 PROCEDURE — 3079F PR MOST RECENT DIASTOLIC BLOOD PRESSURE 80-89 MM HG: ICD-10-PCS | Mod: CPTII,S$GLB,, | Performed by: FAMILY MEDICINE

## 2020-09-01 PROCEDURE — 3075F SYST BP GE 130 - 139MM HG: CPT | Mod: CPTII,S$GLB,, | Performed by: FAMILY MEDICINE

## 2020-09-01 PROCEDURE — 3075F PR MOST RECENT SYSTOLIC BLOOD PRESS GE 130-139MM HG: ICD-10-PCS | Mod: CPTII,S$GLB,, | Performed by: FAMILY MEDICINE

## 2020-09-01 RX ORDER — PREDNISONE 10 MG/1
TABLET ORAL
Qty: 10 TABLET | Refills: 0 | Status: SHIPPED | OUTPATIENT
Start: 2020-09-01 | End: 2020-09-17 | Stop reason: ALTCHOICE

## 2020-09-01 NOTE — PATIENT INSTRUCTIONS
Ice for 15 min at a time 3 to 4 times a day as needed for swelling or pain to left knee    Over-the-counter compression sleeve to left knee    Nerve conduction study to be ordered    Start physical therapy    Voltaren gel 3 times a day to left knee    Recheck in 3-4 weeks after testing several weeks of physical therapy completed

## 2020-09-01 NOTE — PROGRESS NOTES
Subjective:     Patient ID: Súal Perez is a 50 y.o. male.    Chief Complaint: Pain and Swelling of the Left Knee      HPI:  Patient states that he has had chronic problems with the left 5th knee and thigh and had several surgeries over the past few years with most recent being about 2 years ago for which she had repair of left MCL and medial meniscus.    He states that 5 days ago he noticed mild swelling in the distal anterior thigh near the knee and pain in the knee joint with pain with weight-bearing.  He does not know of any recent injury or change in activity.  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  He states he did go to the urgent care the day after onset of the left knee and thigh pain and was given a cortisone injection in the hip which helped the pain partially for a few days.    The patient states he has noticed that he has a little swelling in the left knee joint and left distal thigh occasionally ever since he had the knee surgery a few years ago.  He states that he has been unable the last few years because of knee problems to do the activities he normally enjoys such is various port activities.  He says that the knee has started buckling from time to time over the last month or 2 but he has not had an actual fall.  No recent locking.  No physical therapy in the last year to.  He occasionally sees aches over-the-counter anti-inflammatory medication.    His blood sugar has been under good control recently and also his blood pressure.    Patient is concerned that he has an area of numbness in the infrapatellar region over the past few days which she states is new and he would like this evaluated.    Past Medical History:   Diagnosis Date    Anxiety     Chicken pox     History of alcohol abuse      Past Surgical History:   Procedure Laterality Date    COLONOSCOPY W/ POLYPECTOMY  07/01/2019        ESOPHAGOGASTRODUODENOSCOPY      HERNIA REPAIR      KNEE SURGERY       left knee scope    lipomas removed       Family History   Adopted: Yes     Social History     Socioeconomic History    Marital status:      Spouse name: Not on file    Number of children: Not on file    Years of education: Not on file    Highest education level: Not on file   Occupational History    Not on file   Social Needs    Financial resource strain: Not hard at all    Food insecurity     Worry: Never true     Inability: Never true    Transportation needs     Medical: No     Non-medical: No   Tobacco Use    Smoking status: Never Smoker    Smokeless tobacco: Never Used   Substance and Sexual Activity    Alcohol use: Yes     Frequency: 4 or more times a week     Drinks per session: 5 or 6     Binge frequency: Weekly     Comment: occasional    Drug use: Not on file    Sexual activity: Not on file   Lifestyle    Physical activity     Days per week: 0 days     Minutes per session: 0 min    Stress: Rather much   Relationships    Social connections     Talks on phone: More than three times a week     Gets together: Once a week     Attends Jew service: Not on file     Active member of club or organization: No     Attends meetings of clubs or organizations: Never     Relationship status:    Other Topics Concern    Not on file   Social History Narrative    Not on file       Current Outpatient Medications:     blood sugar diagnostic Strp, To check BG 1 time daily, to use with insurance preferred meter, Disp: 50 strip, Rfl: 11    blood-glucose meter kit, To check BG 1 time daily, to use with insurance preferred meter, Disp: 1 each, Rfl: 0    cyanocobalamin (VITAMIN B-12) 500 MCG tablet, Take 500 mcg by mouth once daily., Disp: , Rfl:     dulaglutide (TRULICITY) 1.5 mg/0.5 mL PnIj, Inject 1.5 mg into the skin every 7 days., Disp: 12 Syringe, Rfl: 0    ketoconazole (NIZORAL) 2 % shampoo, LATHER ON SCALP. LET SIT FOR 5 MIN B RINSING, Disp: , Rfl: 5    lancets Misc, To check BG 1  times daily, to use with insurance preferred meter, Disp: 50 each, Rfl: 11    lisinopriL (PRINIVIL,ZESTRIL) 20 MG tablet, TAKE 1 TABLET(20 MG) BY MOUTH EVERY DAY, Disp: 90 tablet, Rfl: 0    metFORMIN (GLUCOPHAGE) 1000 MG tablet, Take 1 tablet (1,000 mg total) by mouth daily with breakfast., Disp: 90 tablet, Rfl: 0    multivitamin (ONE DAILY MULTIVITAMIN) per tablet, Take 1 tablet by mouth once daily., Disp: , Rfl:     omega-3 acid ethyl esters (LOVAZA) 1 gram capsule, Take 2 capsules (2 g total) by mouth 2 (two) times daily., Disp: 360 capsule, Rfl: 0    rosuvastatin (CRESTOR) 5 MG tablet, Take 1 tablet (5 mg total) by mouth once daily., Disp: 90 tablet, Rfl: 0    traZODone (DESYREL) 100 MG tablet, Take 1 tablet (100 mg total) by mouth every evening., Disp: 90 tablet, Rfl: 0    vitamin D (VITAMIN D3) 1000 units Tab, Take 1,000 Units by mouth once daily., Disp: , Rfl:     aspirin (ECOTRIN) 81 MG EC tablet, aspirin 81 mg tablet,delayed release, Disp: , Rfl:     doxycycline (VIBRAMYCIN) 100 MG Cap, Take 1 capsule (100 mg total) by mouth 2 (two) times daily. (Patient not taking: Reported on 9/1/2020), Disp: 14 capsule, Rfl: 0    imiquimod (ALDARA) 5 % cream, Apply topically 3 (three) times a week. (Patient not taking: Reported on 9/1/2020), Disp: 12 packet, Rfl: 1    lisinopril (PRINIVIL,ZESTRIL) 30 MG tablet, Take 30 mg by mouth once daily. , Disp: , Rfl:     meloxicam (MOBIC) 15 MG tablet, Take 1 tablet (15 mg total) by mouth once daily. (Patient not taking: Reported on 9/1/2020), Disp: 30 tablet, Rfl: 3    omeprazole (PRILOSEC) 40 MG capsule, Take 1 capsule (40 mg total) by mouth before breakfast. Take on empty stomach 30-60 minutes before meal (Patient not taking: Reported on 9/1/2020), Disp: 30 capsule, Rfl: 11    predniSONE (DELTASONE) 10 MG tablet, Take 2 tabs each morning for 2 days and then 1 tab each morning for another 6 days.  Monitor blood sugar., Disp: 10 tablet, Rfl: 0    sertraline  (ZOLOFT) 100 MG tablet, TAKE 1 TABLET(100 MG) BY MOUTH EVERY DAY (Patient not taking: Reported on 9/1/2020), Disp: 90 tablet, Rfl: 0    tetrahydrozoline 0.05% (VISINE) 0.05 % Drop, Visine 0.05 % eye drops, Disp: , Rfl:   Review of patient's allergies indicates:   Allergen Reactions    No known drug allergies      Review of Systems   Constitutional: Negative for chills, fever and weight loss.   Respiratory: Negative for shortness of breath.    Cardiovascular: Negative for chest pain and palpitations.       Objective:   Body mass index is 32.44 kg/m².  Vitals:    09/01/20 0821   BP: 131/88   Pulse: 80           Ortho/SPM Exam alert and oriented well-nourished well-developed ambulatory with a slightly antalgic gait and in no acute distress    Respiratory effort is normal    Left knee and thigh-no acute deformity noted    Mild 1+ edema of the distal quadriceps and mild tenderness at the quad insertion on the patella but no redness or fluctuance.    Full range of motion 0-130 degrees    No varus or valgus laxity and negative Lachman's    Mild tenderness to palpation medial joint    Strength is 4/5    Neurovascular intact except for a small focal area approximately 8 x 4 cm in knee in for a patella region at feels numb to the patient on palpation.  He states that he can feel it but it feels different from the other knee and the other areas of his left leg.    Psychiatric good affect and cognition    IMAGING: Diabetic Eye Screening Photo  Imaged  Both eyes.     Image Quality  Right Eye  Good, Dark.     Left Eye  Good.     Diabetic Retinopathy Screening  Right Eye  No apparent retinopathy.     Left Eye  No apparent retinopathy.     Macular Region  Right Eye  no diabetic macular edema.     Left Eye  no diabetic macular edema.     Vessels  Right Eye  Normal.     Left Eye  Normal.     Optic Nerve Head  Right Eye  C/D <0.6. C/D Asymmetry < 0.2. Good Rim.     Left Eye  C/D <0.6. C/D Asymmetry < 0.2. Good Rim.      Assesment  Right Eye  No Background Diabetic Retinopathy.     Left Eye  No Background Diabetic Retinopathy.     Follow Up  Follow up in 6-9 months. Recommend follow-up with primary eye care.        Radiographs / Imaging : Relevant imaging results reviewed by me and interpreted by me, discussed with the patient and / or family -radiographs from February 2020 reviewed by me and discussed with patient regarding significant degenerative changes including patellofemoral problems .  Her    Discussed strategy of using ice, compression sleeve, Voltaren gel, oral medication and the importance of physical therapy.    Because the patient complains of numbness just inferior to the knee joint and states that this is concerning to him and is a new finding we will request nerve conduction study and will not plan injection to the joint or other manipulation of this area until testing is done.      Assessment:     Encounter Diagnoses   Name Primary?    Left leg numbness Yes    Primary osteoarthritis of left knee     Moderate hypertension         Plan:   Left leg numbness  -     Ambulatory referral/consult to Physiatry; Future; Expected date: 09/08/2020    Primary osteoarthritis of left knee    Moderate hypertension    Other orders  -     predniSONE (DELTASONE) 10 MG tablet; Take 2 tabs each morning for 2 days and then 1 tab each morning for another 6 days.  Monitor blood sugar.  Dispense: 10 tablet; Refill: 0        The patient verbalized good understanding of the medical issues discussed today and expressed appreciation for the care provided.  Patient was given the opportunity to ask questions and be an active participant in their medical care. Patient had no further questions or concerns at this time.     The patient was encouraged to participate in appropriate physical activity.      Art Whitt M.D.  [facility]        This note was dictated using voice recognition software and may have sound a like  errors.

## 2020-09-04 ENCOUNTER — CLINICAL SUPPORT (OUTPATIENT)
Dept: REHABILITATION | Facility: HOSPITAL | Age: 50
End: 2020-09-04
Payer: COMMERCIAL

## 2020-09-04 DIAGNOSIS — R29.898 WEAKNESS OF LEFT LOWER EXTREMITY: ICD-10-CM

## 2020-09-04 DIAGNOSIS — M25.562 ACUTE PAIN OF LEFT KNEE: ICD-10-CM

## 2020-09-04 DIAGNOSIS — M17.12 PRIMARY OSTEOARTHRITIS OF LEFT KNEE: ICD-10-CM

## 2020-09-04 PROCEDURE — 97110 THERAPEUTIC EXERCISES: CPT | Performed by: PHYSICAL THERAPIST

## 2020-09-04 PROCEDURE — 97140 MANUAL THERAPY 1/> REGIONS: CPT | Performed by: PHYSICAL THERAPIST

## 2020-09-04 PROCEDURE — 97162 PT EVAL MOD COMPLEX 30 MIN: CPT | Performed by: PHYSICAL THERAPIST

## 2020-09-04 NOTE — PATIENT INSTRUCTIONS
Strengthening: Quadriceps Set    Tighten muscles on top of thighs by pushing knees down into surface. Hold 5 seconds.  Repeat 20 times left leg per set. Do 2 sets per session. Do 1 sessions per day.        Straight Leg Raise     With left leg straight, other leg bent, raise straight leg until knees are even. Slowly lower. Roll on your side and repeat lifting top leg up, on other side, lifting bottom leg up, and on stomach kicking back (squeezing your rear end before you kick back).  Repeat 8 times left leg per set. Do 3 sets per session. Do 1 sessions per day.         Strengthening: Hip Abductor - Resisted    Lie flat with band looped around both legs above knees, and push thighs apart, ensuring right and left move together.  Repeat 30 times per set. Do 1 sets per session. Do 1 sessions per day.      Clam Shells    Lie on side with knees bent. Raise top leg, keeping knee bent and ankles together. Do not let your hips roll back as your raise your leg.  Repeat 12 times left leg per set. Do 3 sets per session. Do 1 sessions per day.          HIP: Flexors - Supine        Lie on edge of surface. Place leg off the surface, allow knee to bend. Bring other knee toward chest. Hold 20 seconds. 3 reps per set, 1 sets per day 7 days per week  Rest lowered foot on stool.    Copyright © [employer]. All rights reserved.

## 2020-09-10 PROBLEM — R29.898 WEAKNESS OF LEFT LOWER EXTREMITY: Status: ACTIVE | Noted: 2020-09-10

## 2020-09-10 PROBLEM — M25.562 ACUTE PAIN OF LEFT KNEE: Status: ACTIVE | Noted: 2020-09-10

## 2020-09-14 DIAGNOSIS — Z01.818 PRE-OP TESTING: Primary | ICD-10-CM

## 2020-09-15 NOTE — PROGRESS NOTES
Referring Provider:    Sana Charles  No address on file  Subjective:   Patient: Saúl Perez [id number], :1970   Visit date:2020 8:24 AM    Chief Complaint:  No chief complaint on file.    HPI:  Saúl is a 50 y.o. male who I was asked to see in consultation for evaluation of chronic throat discomfort:      Last week woke up with blood filling the mouth.  No episodes of bleeding prior or since.  Thinks he bit his tongue.     Severity: moderate  Quality: dull  Voice Quality: clear  Duration: Many months  Modifying factors:  Worse when drinking too much alcohol  Associated signs and symptoms:  Dyspepsia, belching, feels like solids do not pass down to the stomach.  No choking  Post nasal drip or significant rhinorrhea:  No  Bad taste in the back of the mouth: Yes - frequent; worse in the am  Heartburn: Yes - frequent  Number of cups of caffeinated beverages daily: 2  Number of alcoholic beverages daily: 2  Smoking: No  Smokeless tobacco:  Yes  Prior medical therapy:    - None - which has been ineffective.      Review of Systems:  Negative unless checked off.  Gen:  []fever   []fatigue  HENT:  []nosebleeds  []dental problem   Eyes:  []photophobia  []visual disturbance  Resp:  []chest tightness []wheezing  Card:  []chest pain  []leg swelling  GI:  []abdominal pain []blood in stool  :  []dysuria  []hematuria  Musc:  []joint swelling  []gait problem  Skin:  []color change  []pallor  Neuro:  []seizures  []numbness  Hem:  []bruise/bleed easily  Psych:  []hallucinations  []behavioral problems  Allergy/Imm: is allergic to no known drug allergies.    His meds, allergies, medical, surgical, social & family histories were reviewed & updated:  -     He has a current medication list which includes the following prescription(s): aspirin, blood sugar diagnostic, blood-glucose meter, cyanocobalamin, doxycycline, imiquimod, ketoconazole, lancets, lisinopril, lisinopril, meloxicam, metformin, multivitamin, omega-3  acid ethyl esters, omeprazole, prednisone, rosuvastatin, sertraline, tetrahydrozoline 0.05%, trazodone, trulicity, and vitamin d.  -     He  has a past medical history of Anxiety, Chicken pox, and History of alcohol abuse.   -     He does not have any pertinent problems on file.   -     He  has a past surgical history that includes Hernia repair; Knee surgery; lipomas removed; Esophagogastroduodenoscopy; and Colonoscopy w/ polypectomy (07/01/2019).  -     He  reports that he has never smoked. He has never used smokeless tobacco. He reports current alcohol use.  -     His family history is not on file. He was adopted.  -     He is allergic to no known drug allergies.    Objective:   Physical Exam:  Vitals:  There were no vitals taken for this visit.  General appearance:  Well developed, well nourished    Eyes:  Extraocular motions intact, PERRL    Communication:  no hoarseness, no dysphonia    Ears:  Otoscopy of external auditory canals and tympanic membranes was normal, clinical speech reception thresholds grossly intact, no mass/lesion of auricle.  Nose:  No masses/lesions of external nose, nasal mucosa, septum, and turbinates were within normal limits.  Mouth:  No mass/lesion of lips, teeth, gums, hard/soft palate, tongue, tonsils, or oropharynx.    Cardiovascular:  No pedal edema; Radial Pulses +2     Neck & Lymphatics:  No cervical lymphadenopathy, no neck mass/crepitus/ asymmetry, trachea is midline, no thyroid enlargement/tenderness/mass.    Psych: Oriented x3,  Alert with normal mood and affect.     Respiration/Chest:  Symmetric expansion during respiration, normal respiratory effort.    Skin:  Warm and intact. No ulcerations of face, scalp, neck.      Assessment & Plan:       Overall consistent with GERD.  Discussed that I cannot exclude pharyngeal malignancy but risk of low based on history.  Was scheduled for COVID test for flexible laryngoscopy but he did not attend.  Offered testing and return for scope,  but he deferred.  We discussed his medical conditions, treatments and plan.   Furthermore, I counseled Saúl that while at this point I do not see any evidence of head and neck cancer, this does not eliminate the possibility of a very small malignancy that is currently undetectable on examination or developing head and neck cancer in the future.  I advised them of the signs of head and neck cancer and that they should alert my office should they occur for more than 10-14 days (unintentional weight loss, pain or difficulty with swallowing, bleeding in the oral cavity or throat, coughing or spitting up blood, pain with mouth opening, persistent ulceration of the skin or mucosa, persistent or enlarging neck masses/swelling).  Saúl seemed to have a clear understanding of the concerning nature of these conditions.  Súal should return to clinic if any issues arise (symptoms worsen or persist), otherwise, I recommend seeing GI.             Cecil Crocker MD, FACS [facility]   [facility]  [address] [place] [address]  P: [phone]  F: [phone]        -------------------------------------------------------------------------------------------------------------------

## 2020-09-17 ENCOUNTER — PATIENT MESSAGE (OUTPATIENT)
Dept: GASTROENTEROLOGY | Facility: CLINIC | Age: 50
End: 2020-09-17

## 2020-09-17 ENCOUNTER — OFFICE VISIT (OUTPATIENT)
Dept: OTOLARYNGOLOGY | Facility: CLINIC | Age: 50
End: 2020-09-17
Payer: COMMERCIAL

## 2020-09-17 VITALS
HEART RATE: 91 BPM | WEIGHT: 236.56 LBS | HEIGHT: 72 IN | DIASTOLIC BLOOD PRESSURE: 86 MMHG | BODY MASS INDEX: 32.04 KG/M2 | TEMPERATURE: 97 F | SYSTOLIC BLOOD PRESSURE: 156 MMHG

## 2020-09-17 DIAGNOSIS — R10.13 DYSPEPSIA: ICD-10-CM

## 2020-09-17 DIAGNOSIS — R13.10 DYSPHAGIA, UNSPECIFIED TYPE: ICD-10-CM

## 2020-09-17 DIAGNOSIS — K21.9 GASTROESOPHAGEAL REFLUX DISEASE, ESOPHAGITIS PRESENCE NOT SPECIFIED: Primary | ICD-10-CM

## 2020-09-17 DIAGNOSIS — R14.2 BELCHING: ICD-10-CM

## 2020-09-17 PROCEDURE — 99999 PR PBB SHADOW E&M-EST. PATIENT-LVL V: CPT | Mod: PBBFAC,,, | Performed by: OTOLARYNGOLOGY

## 2020-09-17 PROCEDURE — 3008F PR BODY MASS INDEX (BMI) DOCUMENTED: ICD-10-PCS | Mod: CPTII,S$GLB,, | Performed by: OTOLARYNGOLOGY

## 2020-09-17 PROCEDURE — 3077F PR MOST RECENT SYSTOLIC BLOOD PRESSURE >= 140 MM HG: ICD-10-PCS | Mod: CPTII,S$GLB,, | Performed by: OTOLARYNGOLOGY

## 2020-09-17 PROCEDURE — 99214 PR OFFICE/OUTPT VISIT, EST, LEVL IV, 30-39 MIN: ICD-10-PCS | Mod: S$GLB,,, | Performed by: OTOLARYNGOLOGY

## 2020-09-17 PROCEDURE — 3079F DIAST BP 80-89 MM HG: CPT | Mod: CPTII,S$GLB,, | Performed by: OTOLARYNGOLOGY

## 2020-09-17 PROCEDURE — 99214 OFFICE O/P EST MOD 30 MIN: CPT | Mod: S$GLB,,, | Performed by: OTOLARYNGOLOGY

## 2020-09-17 PROCEDURE — 3077F SYST BP >= 140 MM HG: CPT | Mod: CPTII,S$GLB,, | Performed by: OTOLARYNGOLOGY

## 2020-09-17 PROCEDURE — 99999 PR PBB SHADOW E&M-EST. PATIENT-LVL V: ICD-10-PCS | Mod: PBBFAC,,, | Performed by: OTOLARYNGOLOGY

## 2020-09-17 PROCEDURE — 3008F BODY MASS INDEX DOCD: CPT | Mod: CPTII,S$GLB,, | Performed by: OTOLARYNGOLOGY

## 2020-09-17 PROCEDURE — 3079F PR MOST RECENT DIASTOLIC BLOOD PRESSURE 80-89 MM HG: ICD-10-PCS | Mod: CPTII,S$GLB,, | Performed by: OTOLARYNGOLOGY

## 2020-09-21 ENCOUNTER — CLINICAL SUPPORT (OUTPATIENT)
Dept: REHABILITATION | Facility: HOSPITAL | Age: 50
End: 2020-09-21
Payer: COMMERCIAL

## 2020-09-21 DIAGNOSIS — R29.898 WEAKNESS OF LEFT LOWER EXTREMITY: ICD-10-CM

## 2020-09-21 DIAGNOSIS — M25.562 ACUTE PAIN OF LEFT KNEE: ICD-10-CM

## 2020-09-21 PROCEDURE — 97140 MANUAL THERAPY 1/> REGIONS: CPT | Mod: CQ

## 2020-09-21 PROCEDURE — 97110 THERAPEUTIC EXERCISES: CPT | Mod: CQ

## 2020-09-21 NOTE — PROGRESS NOTES
Physical Therapy Daily Treatment Note     Name: Saúl Perez  Clinic Number: [id number]    Therapy Diagnosis:   Encounter Diagnoses   Name Primary?    Acute pain of left knee     Weakness of left lower extremity      Physician: Art Whitt MD    Visit Date: 9/21/2020    Physician Orders: PT Eval and Treat   Medical Diagnosis from Referral: M17.12 (ICD-10-CM) - Primary osteoarthritis of left knee  Evaluation Date: 9/4/2020  Authorization Period Expiration: 12/31/2020  Plan of Care Expiration: 10/16/2020  Visit # / Visits authorized: 2/ 20  FOTO: 2/10    Precautions: Standard    Time In: 12:45 PM  Time Out: 1:35  Total Billable Time: 50 minutes    SUBJECTIVE     Today, pt reports: Numbness still at right anterior knee and manpreet but less. Over the weekend was performing home chores and heard a loud pop at lateral side of patella on 2 occasions but after experienced less pain. Continues to buckle spontaneously requiring patient to catch himself.   He was not compliant with home exercise program.  Response to previous treatment: Felt good after evaluation  Functional change: Ascending and descending stairs     Pre-Treatment Pain: 4/10  Post-Treatment Pain: 1/10  Location: left distal quadriceps  TREATMENT     Saúl received therapeutic exercises to develop strength, endurance, ROM and flexibility for 40 minutes including:    Exercise 9/21/2020   Quad sets 3 minutes   Straight leg raises 3x10   Side lying hip abduction 3x10   clams 3x10   Jameson test hip flexor stretch 2x 1 minute   Sitting vastus medialis activation 4 minutes               Saúl received the following manual therapy techniques: Soft tissue Mobilization were applied to the: for 10 minutes, including:  IASTM of left quadriceps, vastus lateralis and vastus medialis and iliotibial band    Home Exercises Provided and Patient Education Provided     Education/Self-Care provided: (0 minutes)   Patient educated on biomechanical justification for  therapeutic exercise and importance of compliance with HEP in order to improve overall impairments and QOL    Patient educated on postural awareness and the use of a lumbar roll when in a seated position to reduce stress and maintain optimal alignment of the spine.     Written Home Exercises Provided: Patient instructed to cont prior HEP.  Exercises were reviewed and Saúl was able to demonstrate them prior to the end of the session.  Saúl demonstrated good  understanding of the education provided.     See EMR under Patient Instructions for exercises provided prior visit.    ASSESSMENT   Pt tolerated manual therapy with noted trigger points at medial quadriceps and at distal iliotibial band. Decreased intensity of tingling noted after manual therapy. Pt tolerated exercise with difficulty with activation solely of vastus medialis but with practice patient was able to activate muscle with slight contraction. Patient demonstrated good understanding of exercises.    Saúl is progressing well towards his goals.   Pt prognosis is Good.     Pt will continue to benefit from skilled outpatient physical therapy to address the deficits listed in the problem list box on initial evaluation, provide pt/family education and to maximize pt's level of independence in the home and community environment.     Pt's spiritual, cultural and educational needs considered and pt agreeable to plan of care and goals.     Anticipated barriers to physical therapy: None    Goals:     Long Term Goals (6 Weeks):  1. Pt will be independent with HEP to supplement PT in improving functional mobility  2. Pt will demonstrate proper VMO activation during quad set B to improve patellar tracking during plyometric tasks.   3. Pt will ascend 1 flight of stairs with no pain to promote functional mobility.   4. Pt will improve FOTO score to </= 40% limited to decrease perceived limitation with mobility  5. Pt will improve hip MMTs to >/= 4+/5 B to decrease  load on knee joint during plyometric tasks    PLAN   Continue Plan of Care to strengthen quadriceps allowing patient to be able to ascend and descend stairs with no difficulty.     Progress Report due: 10/2/2020    Aldo Schmitz PTA

## 2020-09-24 ENCOUNTER — TELEPHONE (OUTPATIENT)
Dept: REHABILITATION | Facility: HOSPITAL | Age: 50
End: 2020-09-24

## 2020-09-24 NOTE — TELEPHONE ENCOUNTER
patient states he did not realize he had an appointment today. confirmed next OTW appointment with patient.

## 2020-09-25 ENCOUNTER — OFFICE VISIT (OUTPATIENT)
Dept: GASTROENTEROLOGY | Facility: CLINIC | Age: 50
End: 2020-09-25
Payer: COMMERCIAL

## 2020-09-25 ENCOUNTER — TELEPHONE (OUTPATIENT)
Dept: GASTROENTEROLOGY | Facility: CLINIC | Age: 50
End: 2020-09-25

## 2020-09-25 VITALS
WEIGHT: 229.5 LBS | HEIGHT: 72 IN | SYSTOLIC BLOOD PRESSURE: 136 MMHG | BODY MASS INDEX: 31.08 KG/M2 | OXYGEN SATURATION: 96 % | DIASTOLIC BLOOD PRESSURE: 86 MMHG | HEART RATE: 111 BPM

## 2020-09-25 DIAGNOSIS — K21.9 GASTROESOPHAGEAL REFLUX DISEASE, ESOPHAGITIS PRESENCE NOT SPECIFIED: ICD-10-CM

## 2020-09-25 PROCEDURE — 3075F PR MOST RECENT SYSTOLIC BLOOD PRESS GE 130-139MM HG: ICD-10-PCS | Mod: CPTII,S$GLB,, | Performed by: NURSE PRACTITIONER

## 2020-09-25 PROCEDURE — 3079F PR MOST RECENT DIASTOLIC BLOOD PRESSURE 80-89 MM HG: ICD-10-PCS | Mod: CPTII,S$GLB,, | Performed by: NURSE PRACTITIONER

## 2020-09-25 PROCEDURE — 3075F SYST BP GE 130 - 139MM HG: CPT | Mod: CPTII,S$GLB,, | Performed by: NURSE PRACTITIONER

## 2020-09-25 PROCEDURE — 3079F DIAST BP 80-89 MM HG: CPT | Mod: CPTII,S$GLB,, | Performed by: NURSE PRACTITIONER

## 2020-09-25 PROCEDURE — 99203 PR OFFICE/OUTPT VISIT, NEW, LEVL III, 30-44 MIN: ICD-10-PCS | Mod: S$GLB,,, | Performed by: NURSE PRACTITIONER

## 2020-09-25 PROCEDURE — 3008F BODY MASS INDEX DOCD: CPT | Mod: CPTII,S$GLB,, | Performed by: NURSE PRACTITIONER

## 2020-09-25 PROCEDURE — 99999 PR PBB SHADOW E&M-EST. PATIENT-LVL V: ICD-10-PCS | Mod: PBBFAC,,, | Performed by: NURSE PRACTITIONER

## 2020-09-25 PROCEDURE — 99203 OFFICE O/P NEW LOW 30 MIN: CPT | Mod: S$GLB,,, | Performed by: NURSE PRACTITIONER

## 2020-09-25 PROCEDURE — 99999 PR PBB SHADOW E&M-EST. PATIENT-LVL V: CPT | Mod: PBBFAC,,, | Performed by: NURSE PRACTITIONER

## 2020-09-25 PROCEDURE — 3008F PR BODY MASS INDEX (BMI) DOCUMENTED: ICD-10-PCS | Mod: CPTII,S$GLB,, | Performed by: NURSE PRACTITIONER

## 2020-09-25 RX ORDER — DICLOFENAC SODIUM 50 MG/1
TABLET, DELAYED RELEASE ORAL
COMMUNITY
Start: 2020-08-28 | End: 2021-09-02

## 2020-09-25 RX ORDER — OMEPRAZOLE 40 MG/1
40 CAPSULE, DELAYED RELEASE ORAL DAILY
Qty: 30 CAPSULE | Refills: 5 | Status: SHIPPED | OUTPATIENT
Start: 2020-09-25 | End: 2021-04-19

## 2020-09-25 NOTE — PROGRESS NOTES
Clinic Consult:  [facility] Consultation Note    Reason for Consult:  The encounter diagnosis was Gastroesophageal reflux disease, esophagitis presence not specified.    PCP: Kartik Ibarra   [id number] [facility] / [place] [id number]    HPI:  This is a 50 y.o. male here for evaluation of GERD  Chronic cough after eating. Also with indigestion. No dysphagia.   Was previously on omeprazole in January for 2 months. Unsure if there was improvement. No prior workup done.     Review of Systems   Constitutional: Negative for fever, malaise/fatigue and weight loss.   HENT: Negative for sore throat.    Respiratory: Negative for cough and wheezing.    Cardiovascular: Negative for chest pain and palpitations.   Gastrointestinal: Positive for heartburn. Negative for abdominal pain, blood in stool, constipation, diarrhea, melena, nausea and vomiting.   Genitourinary: Negative for dysuria and frequency.   Musculoskeletal: Negative for back pain, joint pain, myalgias and neck pain.   Skin: Negative for itching and rash.   Neurological: Negative for dizziness, speech change, seizures, loss of consciousness and headaches.   Psychiatric/Behavioral: Negative for depression and substance abuse. The patient is not nervous/anxious.        Medical History:  has a past medical history of Anxiety, Chicken pox, and History of alcohol abuse.    Surgical History:  has a past surgical history that includes Hernia repair; Knee surgery; lipomas removed; Esophagogastroduodenoscopy; and Colonoscopy w/ polypectomy (07/01/2019).    Family History: family history is not on file. He was adopted..     Social History:  reports that he has never smoked. He has never used smokeless tobacco. He reports current alcohol use.    Allergies: Reviewed    Home Medications:   Current Outpatient Medications on File Prior to Visit   Medication Sig Dispense Refill    aspirin (ECOTRIN) 81 MG EC tablet aspirin 81 mg tablet,delayed release      blood  sugar diagnostic Strp To check BG 1 time daily, to use with insurance preferred meter 50 strip 11    blood-glucose meter kit To check BG 1 time daily, to use with insurance preferred meter 1 each 0    cyanocobalamin (VITAMIN B-12) 500 MCG tablet Take 500 mcg by mouth once daily.      doxycycline (VIBRAMYCIN) 100 MG Cap Take 1 capsule (100 mg total) by mouth 2 (two) times daily. 14 capsule 0    imiquimod (ALDARA) 5 % cream Apply topically 3 (three) times a week. 12 packet 1    ketoconazole (NIZORAL) 2 % shampoo LATHER ON SCALP. LET SIT FOR 5 MIN B RINSING  5    lancets Misc To check BG 1 times daily, to use with insurance preferred meter 50 each 11    lisinopriL (PRINIVIL,ZESTRIL) 20 MG tablet TAKE 1 TABLET(20 MG) BY MOUTH EVERY DAY 90 tablet 0    meloxicam (MOBIC) 15 MG tablet Take 1 tablet (15 mg total) by mouth once daily. 30 tablet 3    metFORMIN (GLUCOPHAGE) 1000 MG tablet TAKE 1 TABLET(1000 MG) BY MOUTH DAILY WITH BREAKFAST 30 tablet 0    multivitamin (ONE DAILY MULTIVITAMIN) per tablet Take 1 tablet by mouth once daily.      omega-3 acid ethyl esters (LOVAZA) 1 gram capsule Take 2 capsules (2 g total) by mouth 2 (two) times daily. 360 capsule 0    rosuvastatin (CRESTOR) 5 MG tablet TAKE 1 TABLET(5 MG) BY MOUTH EVERY DAY 30 tablet 0    sertraline (ZOLOFT) 100 MG tablet TAKE 1 TABLET(100 MG) BY MOUTH EVERY DAY 90 tablet 0    TRULICITY 1.5 mg/0.5 mL pen injector INJECT 1.5MG UNDER THE SKIN EVERY 7 DAYS 4 pen 0    vitamin D (VITAMIN D3) 1000 units Tab Take 1,000 Units by mouth once daily.      diclofenac (VOLTAREN) 50 MG EC tablet       lisinopril (PRINIVIL,ZESTRIL) 30 MG tablet Take 30 mg by mouth once daily.       tetrahydrozoline 0.05% (VISINE) 0.05 % Drop Visine 0.05 % eye drops      traZODone (DESYREL) 100 MG tablet Take 1 tablet (100 mg total) by mouth every evening. (Patient not taking: Reported on 9/25/2020) 90 tablet 0    [DISCONTINUED] omeprazole (PRILOSEC) 40 MG capsule Take 1  capsule (40 mg total) by mouth before breakfast. Take on empty stomach 30-60 minutes before meal (Patient not taking: Reported on 9/25/2020) 30 capsule 11     No current facility-administered medications on file prior to visit.        Physical Exam:  /86   Pulse (!) 111   Ht 6' (1.829 m)   Wt 104.1 kg (229 lb 8 oz)   SpO2 96%   BMI 31.13 kg/m²   Body mass index is 31.13 kg/m².  Physical Exam   Constitutional: He is oriented to person, place, and time and well-developed, well-nourished, and in no distress. No distress.   HENT:   Head: Normocephalic.   Eyes: Pupils are equal, round, and reactive to light. Conjunctivae are normal.   Cardiovascular: Normal rate, regular rhythm and normal heart sounds.   Pulmonary/Chest: Effort normal and breath sounds normal. No respiratory distress.   Abdominal: Soft. Bowel sounds are normal. He exhibits no distension. There is no abdominal tenderness.   Neurological: He is alert and oriented to person, place, and time. No cranial nerve deficit.   Skin: Skin is warm and dry. No rash noted.   Psychiatric: Mood and affect normal.       Labs: Pertinent labs reviewed.  CRC Screening:     Assessment:  1. Gastroesophageal reflux disease, esophagitis presence not specified        Recommendations:   - restart PPI  - EGD     Gastroesophageal reflux disease, esophagitis presence not specified  -     Ambulatory referral/consult to Gastroenterology  -     Case request GI: ESOPHAGOGASTRODUODENOSCOPY (EGD)  -     COVID-19 Routine Screening; Future; Expected date: 09/25/2020    Other orders  -     omeprazole (PRILOSEC) 40 MG capsule; Take 1 capsule (40 mg total) by mouth once daily.  Dispense: 30 capsule; Refill: 5    Follow up to be determined after results/ procedure(s).    Thank you so much for allowing me to participate in the care of TSERING Mcclellan

## 2020-09-25 NOTE — LETTER
September 25, 2020      Cecil Crocker MD  [address]  [place] [address]           [facility]  [address]  [place] [address]  Phone: [phone]  Fax: [phone]          Patient: Saúl Perez   MR Number: [id number]   YOB: 1970   Date of Visit: 9/25/2020       Dear Dr. Cecil Crocker:    Thank you for referring Saúl Perez to me for evaluation. Attached you will find relevant portions of my assessment and plan of care.    If you have questions, please do not hesitate to call me. I look forward to following Saúl Perez along with you.    Sincerely,    Leida Jesus, NP    Enclosure  CC:  No Recipients    If you would like to receive this communication electronically, please contact [email or web address] or [phone] to request more information on [employer] Link access.    For providers and/or their staff who would like to refer a patient to [facility], please contact us through our one-stop-shop provider referral line, [facility] , at [phone].    If you feel you have received this communication in error or would no longer like to receive these types of communications, please e-mail [email or web address]

## 2020-09-25 NOTE — TELEPHONE ENCOUNTER
"    ENDO screening    1. Have you been admitted for cardiac, kidney or pulmonary causes to the hospital in the past 3 months? no   If yes, schedule an appointment with PCP before scheduling endoscopic procedure.     2. Have you had a stent placed in the last 12 months? no   If yes, for a screening visit, cancel and message the ordering provider.  The patient will need a new order when the time is appropriate.     3. Have you had a stroke or heart attack in the past 6 months? no   If yes, cancel and refer patient to ordering provider for clearance, also message ordering provider to inform.     4. Have you had any chest pain in the past 3 months? yes   If yes, Have you been evaluated by your PCP and/or cardiologist and it was determined to not be heart related? yes   If No, Pt needs to be seen by PCP or Cardiologist .  Pt can be scheduled once clearance obtained by either of those providers.     5. Do you take prescription weight loss medications?  no   If yes, must stop for 2 weeks prior to procedure.     6. Have you been diagnosed with diverticulitis within the past 3 months? no   If yes, must have been seen by GI within the last 3 months, if not schedule with GI SHANNAN.    If pt has been seen by GI, schedule procedure 8-12 weeks post antibiotic treatment.     7. Are you on Dialysis? no  If yes, schedule procedure for the day AFTER dialysis.  Appt time should be 9am or later, patient arrival time is 2 hours prior.  Nulytely or miralax prep for all patients with kidney disease.     8. Are you diabetic?  no   If yes, schedule morning appt. Advise pt to hold all diabetic meds day of procedure.     9. If pt is older than 80 years of age and HAS NOT been seen by GI or PCP within the last 6 months, needs appt with GI SHANNAN.   If pt has been seen by the GI provider or PCP within the past 6  months AND meets criteria, schedule procedure AND send message to the endoscopist.     10. Is patient on a "high risk" medication (blood " thinner/antiplatelet agent)?  no   If yes, has cardiac clearance been obtained within the last 60 days? N/A   If no, a new clearance needs to be obtained.     Final Questions:    1.I have reviewed the last colonoscopy for recommendations regarding next procedure bowel prep.  no  2. I have reviewed medications and allergies.  yes  3. I have verified the pharmacy information and appropriate prep sent if needed. yes  4. Prep instructions have been mailed or sent to portal per patient request. yes          All schedulers will have ability to reach out to the ordering GI provider to clarify any issues.

## 2020-09-28 ENCOUNTER — LAB VISIT (OUTPATIENT)
Dept: OTOLARYNGOLOGY | Facility: CLINIC | Age: 50
End: 2020-09-28
Payer: COMMERCIAL

## 2020-09-28 DIAGNOSIS — K21.9 GASTROESOPHAGEAL REFLUX DISEASE, ESOPHAGITIS PRESENCE NOT SPECIFIED: ICD-10-CM

## 2020-09-28 PROCEDURE — U0003 INFECTIOUS AGENT DETECTION BY NUCLEIC ACID (DNA OR RNA); SEVERE ACUTE RESPIRATORY SYNDROME CORONAVIRUS 2 (SARS-COV-2) (CORONAVIRUS DISEASE [COVID-19]), AMPLIFIED PROBE TECHNIQUE, MAKING USE OF HIGH THROUGHPUT TECHNOLOGIES AS DESCRIBED BY CMS-2020-01-R: HCPCS

## 2020-09-28 NOTE — PRE-PROCEDURE INSTRUCTIONS
Spoke with patient in regards to EGD procedure on October 1, 2020. Pt stated he received endoscopy instructions with no further questions at this time, after reviewing instructions with RN. NPO after midnight on 9/30/20, will take Lisinopril morning of procedure. Instructed to continue holding: ASA 81 mg, Mobic and Metformin day of procedure. Pt will arrive for 0630 at the [facility] on October 1, 2020 and will be accompanied by his father.   Telephone number given for pt to call back with any questions if needed.

## 2020-09-29 ENCOUNTER — ANESTHESIA EVENT (OUTPATIENT)
Dept: ENDOSCOPY | Facility: HOSPITAL | Age: 50
End: 2020-09-29
Payer: COMMERCIAL

## 2020-09-29 LAB — SARS-COV-2 RNA RESP QL NAA+PROBE: NOT DETECTED

## 2020-09-29 NOTE — ANESTHESIA PREPROCEDURE EVALUATION
09/29/2020  Saúl Perez is a 50 y.o., male.  Past Medical History:   Diagnosis Date    Anxiety     Chicken pox     History of alcohol abuse      Past Surgical History:   Procedure Laterality Date    COLONOSCOPY W/ POLYPECTOMY  07/01/2019        ESOPHAGOGASTRODUODENOSCOPY      HERNIA REPAIR      KNEE SURGERY      left knee scope    lipomas removed           Anesthesia Evaluation    I have reviewed the Patient Summary Reports.    I have reviewed the Nursing Notes. I have reviewed the NPO Status.   I have reviewed the Medications.     Review of Systems  Anesthesia Hx:  No problems with previous Anesthesia   Denies Personal Hx of Anesthesia complications.   Social:  Non-Smoker, Alcohol Use    Hematology/Oncology:  Hematology Normal   Oncology Normal     EENT/Dental:EENT/Dental Normal   Cardiovascular:   Hypertension    Pulmonary:  Pulmonary Normal    Renal/:  Renal/ Normal     Hepatic/GI:   GERD    Musculoskeletal:  Musculoskeletal Normal    Neurological:  Neurology Normal    Endocrine:   Diabetes, poorly controlled, type 2    Psych:   anxiety depression          Physical Exam  General:  Well nourished    Airway/Jaw/Neck:  Airway Findings: Mouth Opening: Normal Tongue: Normal  General Airway Assessment: Adult  Mallampati: III  Improves to II with phonation.  TM Distance: Normal, at least 6 cm  Jaw/Neck Findings:  Neck ROM: Extension Decreased, Mild  Neck Findings:  Girth Increased     Eyes/Ears/Nose:  Eyes/Ears/Nose Findings:    Dental:  Dental Findings: In tact   Chest/Lungs:  Chest/Lungs Findings: Clear to auscultation, Normal Respiratory Rate     Heart/Vascular:  Heart Findings: Rate: Normal  Rhythm: Regular Rhythm  Sounds: Normal  Heart murmur: negative Vascular Findings: Normal    Abdomen:  Abdomen Findings: Normal    Musculoskeletal:  Musculoskeletal Findings: Normal    Skin:  Skin Findings: Normal    Mental Status:  Mental Status Findings:  Cooperative, Alert and Oriented         Anesthesia Plan  Type of Anesthesia, risks & benefits discussed:  Anesthesia Type:  general  Patient's Preference:   Intra-op Monitoring Plan: standard ASA monitors  Intra-op Monitoring Plan Comments:   Post Op Pain Control Plan: per primary service following discharge from PACU  Post Op Pain Control Plan Comments:   Induction:   IV  Beta Blocker:  Patient is not currently on a Beta-Blocker (No further documentation required).       Informed Consent: Patient understands risks and agrees with Anesthesia plan.  Questions answered. Anesthesia consent signed with patient.  ASA Score: 2     Day of Surgery Review of History & Physical:    H&P update referred to the provider.         Ready For Surgery From Anesthesia Perspective.

## 2020-10-01 ENCOUNTER — HOSPITAL ENCOUNTER (OUTPATIENT)
Facility: HOSPITAL | Age: 50
Discharge: HOME | End: 2020-10-01
Attending: INTERNAL MEDICINE | Admitting: INTERNAL MEDICINE
Payer: COMMERCIAL

## 2020-10-01 ENCOUNTER — ANESTHESIA (OUTPATIENT)
Dept: ENDOSCOPY | Facility: HOSPITAL | Age: 50
End: 2020-10-01
Payer: COMMERCIAL

## 2020-10-01 VITALS
HEIGHT: 72 IN | OXYGEN SATURATION: 96 % | HEART RATE: 73 BPM | RESPIRATION RATE: 15 BRPM | BODY MASS INDEX: 31.48 KG/M2 | DIASTOLIC BLOOD PRESSURE: 95 MMHG | WEIGHT: 232.38 LBS | SYSTOLIC BLOOD PRESSURE: 153 MMHG | TEMPERATURE: 98 F

## 2020-10-01 DIAGNOSIS — K21.00 GASTROESOPHAGEAL REFLUX DISEASE WITH ESOPHAGITIS WITHOUT HEMORRHAGE: ICD-10-CM

## 2020-10-01 DIAGNOSIS — K21.9 GERD WITHOUT ESOPHAGITIS: Primary | ICD-10-CM

## 2020-10-01 DIAGNOSIS — Z01.818 PREOP TESTING: ICD-10-CM

## 2020-10-01 LAB — POCT GLUCOSE: 184 MG/DL (ref 70–110)

## 2020-10-01 PROCEDURE — D9220A PRA ANESTHESIA: ICD-10-PCS | Mod: ANES,,, | Performed by: ANESTHESIOLOGY

## 2020-10-01 PROCEDURE — D9220A PRA ANESTHESIA: Mod: CRNA,,, | Performed by: NURSE ANESTHETIST, CERTIFIED REGISTERED

## 2020-10-01 PROCEDURE — 63600175 PHARM REV CODE 636 W HCPCS: Performed by: NURSE ANESTHETIST, CERTIFIED REGISTERED

## 2020-10-01 PROCEDURE — 43235 PR EGD, FLEX, DIAGNOSTIC: ICD-10-PCS | Mod: 52,,, | Performed by: INTERNAL MEDICINE

## 2020-10-01 PROCEDURE — 25000003 PHARM REV CODE 250: Performed by: NURSE ANESTHETIST, CERTIFIED REGISTERED

## 2020-10-01 PROCEDURE — 43235 EGD DIAGNOSTIC BRUSH WASH: CPT | Performed by: INTERNAL MEDICINE

## 2020-10-01 PROCEDURE — D9220A PRA ANESTHESIA: Mod: ANES,,, | Performed by: ANESTHESIOLOGY

## 2020-10-01 PROCEDURE — 82962 GLUCOSE BLOOD TEST: CPT | Performed by: INTERNAL MEDICINE

## 2020-10-01 PROCEDURE — 37000009 HC ANESTHESIA EA ADD 15 MINS: Performed by: INTERNAL MEDICINE

## 2020-10-01 PROCEDURE — D9220A PRA ANESTHESIA: ICD-10-PCS | Mod: CRNA,,, | Performed by: NURSE ANESTHETIST, CERTIFIED REGISTERED

## 2020-10-01 PROCEDURE — 43235 EGD DIAGNOSTIC BRUSH WASH: CPT | Mod: 52,,, | Performed by: INTERNAL MEDICINE

## 2020-10-01 PROCEDURE — 37000008 HC ANESTHESIA 1ST 15 MINUTES: Performed by: INTERNAL MEDICINE

## 2020-10-01 RX ORDER — SODIUM CHLORIDE, SODIUM LACTATE, POTASSIUM CHLORIDE, CALCIUM CHLORIDE 600; 310; 30; 20 MG/100ML; MG/100ML; MG/100ML; MG/100ML
INJECTION, SOLUTION INTRAVENOUS CONTINUOUS
Status: DISCONTINUED | OUTPATIENT
Start: 2020-10-01 | End: 2020-10-01 | Stop reason: HOSPADM

## 2020-10-01 RX ORDER — LIDOCAINE HYDROCHLORIDE 10 MG/ML
1 INJECTION, SOLUTION EPIDURAL; INFILTRATION; INTRACAUDAL; PERINEURAL ONCE
Status: DISCONTINUED | OUTPATIENT
Start: 2020-10-01 | End: 2020-10-01 | Stop reason: HOSPADM

## 2020-10-01 RX ORDER — PROPOFOL 10 MG/ML
INJECTION, EMULSION INTRAVENOUS
Status: DISCONTINUED | OUTPATIENT
Start: 2020-10-01 | End: 2020-10-01

## 2020-10-01 RX ORDER — SODIUM CHLORIDE, SODIUM LACTATE, POTASSIUM CHLORIDE, CALCIUM CHLORIDE 600; 310; 30; 20 MG/100ML; MG/100ML; MG/100ML; MG/100ML
INJECTION, SOLUTION INTRAVENOUS CONTINUOUS PRN
Status: DISCONTINUED | OUTPATIENT
Start: 2020-10-01 | End: 2020-10-01

## 2020-10-01 RX ORDER — ONDANSETRON 2 MG/ML
4 INJECTION INTRAMUSCULAR; INTRAVENOUS DAILY PRN
Status: DISCONTINUED | OUTPATIENT
Start: 2020-10-01 | End: 2020-10-01 | Stop reason: HOSPADM

## 2020-10-01 RX ORDER — SODIUM CHLORIDE 0.9 % (FLUSH) 0.9 %
10 SYRINGE (ML) INJECTION
Status: DISCONTINUED | OUTPATIENT
Start: 2020-10-01 | End: 2021-03-10

## 2020-10-01 RX ORDER — FENTANYL CITRATE 50 UG/ML
INJECTION, SOLUTION INTRAMUSCULAR; INTRAVENOUS
Status: DISCONTINUED | OUTPATIENT
Start: 2020-10-01 | End: 2020-10-01

## 2020-10-01 RX ORDER — LIDOCAINE HYDROCHLORIDE 20 MG/ML
INJECTION, SOLUTION EPIDURAL; INFILTRATION; INTRACAUDAL; PERINEURAL
Status: DISCONTINUED | OUTPATIENT
Start: 2020-10-01 | End: 2020-10-01

## 2020-10-01 RX ADMIN — PROPOFOL 30 MG: 10 INJECTION, EMULSION INTRAVENOUS at 08:10

## 2020-10-01 RX ADMIN — SODIUM CHLORIDE, SODIUM LACTATE, POTASSIUM CHLORIDE, AND CALCIUM CHLORIDE: .6; .31; .03; .02 INJECTION, SOLUTION INTRAVENOUS at 08:10

## 2020-10-01 RX ADMIN — PROPOFOL 50 MG: 10 INJECTION, EMULSION INTRAVENOUS at 08:10

## 2020-10-01 RX ADMIN — LIDOCAINE HYDROCHLORIDE 50 MG: 20 INJECTION, SOLUTION EPIDURAL; INFILTRATION; INTRACAUDAL; PERINEURAL at 08:10

## 2020-10-01 RX ADMIN — FENTANYL CITRATE 50 MCG: 50 INJECTION, SOLUTION INTRAMUSCULAR; INTRAVENOUS at 08:10

## 2020-10-01 NOTE — H&P
PRE PROCEDURE H&P    Patient Name: Saúl Perez  MRN: [id number]  : 1970  Date of Procedure:  10/1/2020  Referring Physician: Leida Jesus NP  Primary Physician: Kartik Ibarra DO  Procedure Physician: Markell Kuhn MD       Planned Procedure: EGD  Diagnosis: GERD  Chief Complaint: Same as above    HPI: Patient is an 50 y.o. male is here for the above. H/o heartburn. Symptoms negative for dysphagia. Previously on PPI but patient is unsure if it worked. No previous h/o of EGD in our system. Patient was started on PPI 2020. Patient is not on AC.         Past Medical History:   Past Medical History:   Diagnosis Date    Anxiety     Chicken pox     History of alcohol abuse         Past Surgical History:  Past Surgical History:   Procedure Laterality Date    COLONOSCOPY W/ POLYPECTOMY  2019        ESOPHAGOGASTRODUODENOSCOPY      HERNIA REPAIR      KNEE SURGERY      left knee scope    lipomas removed          Home Medications:  Prior to Admission medications    Medication Sig Start Date End Date Taking? Authorizing Provider   imiquimod (ALDARA) 5 % cream Apply topically 3 (three) times a week. 18  Yes Kartik Ibarra, DO   lisinopriL (PRINIVIL,ZESTRIL) 20 MG tablet TAKE 1 TABLET(20 MG) BY MOUTH EVERY DAY 20  Yes Kartik Ibarra DO   meloxicam (MOBIC) 15 MG tablet Take 1 tablet (15 mg total) by mouth once daily. 20  Yes Ekta Song NP   metFORMIN (GLUCOPHAGE) 1000 MG tablet TAKE 1 TABLET(1000 MG) BY MOUTH DAILY WITH BREAKFAST 20  Yes Kartik Ibarra DO   multivitamin (ONE DAILY MULTIVITAMIN) per tablet Take 1 tablet by mouth once daily.   Yes Historical Provider   omega-3 acid ethyl esters (LOVAZA) 1 gram capsule Take 2 capsules (2 g total) by mouth 2 (two) times daily. 20  Yes Kartik Ibarra DO   omeprazole (PRILOSEC) 40 MG capsule Take 1 capsule (40 mg total) by mouth once daily. 20 Yes Leida Jesus NP   rosuvastatin  (CRESTOR) 5 MG tablet TAKE 1 TABLET(5 MG) BY MOUTH EVERY DAY 9/22/20  Yes Kartik Ibarra DO   sertraline (ZOLOFT) 100 MG tablet TAKE 1 TABLET(100 MG) BY MOUTH EVERY DAY 6/18/20  Yes Kartik Ibarra DO   vitamin D (VITAMIN D3) 1000 units Tab Take 1,000 Units by mouth once daily.   Yes Historical Provider   aspirin (ECOTRIN) 81 MG EC tablet aspirin 81 mg tablet,delayed release    Historical Provider   blood sugar diagnostic Strp To check BG 1 time daily, to use with insurance preferred meter 8/6/20   Kartik Ibarra DO   blood-glucose meter kit To check BG 1 time daily, to use with insurance preferred meter 8/6/20 8/6/21  Kartik Ibarra DO   cyanocobalamin (VITAMIN B-12) 500 MCG tablet Take 500 mcg by mouth once daily.    Historical Provider   diclofenac (VOLTAREN) 50 MG EC tablet  8/28/20   Historical Provider   doxycycline (VIBRAMYCIN) 100 MG Cap Take 1 capsule (100 mg total) by mouth 2 (two) times daily. 3/26/20   Kartik Ibarra DO   ketoconazole (NIZORAL) 2 % shampoo LATHER ON SCALP. LET SIT FOR 5 MIN B RINSING 1/15/19   Historical Provider   lancets Misc To check BG 1 times daily, to use with insurance preferred meter 8/6/20   Kartik Ibarra DO   lisinopril (PRINIVIL,ZESTRIL) 30 MG tablet Take 30 mg by mouth once daily.  2/9/20   Historical Provider   tetrahydrozoline 0.05% (VISINE) 0.05 % Drop Visine 0.05 % eye drops    Historical Provider   traZODone (DESYREL) 100 MG tablet Take 1 tablet (100 mg total) by mouth every evening.  Patient not taking: Reported on 9/25/2020 6/18/20   Kartik Ibarra DO   TRULICITY 1.5 mg/0.5 mL pen injector INJECT 1.5MG UNDER THE SKIN EVERY 7 DAYS 9/11/20   Kartik Ibarra DO        Allergies:  Review of patient's allergies indicates:   Allergen Reactions    No known drug allergies         Social History:   Social History     Socioeconomic History    Marital status:      Spouse name: Not on file    Number of children: Not on file    Years of education: Not on  file    Highest education level: Not on file   Occupational History    Not on file   Social Needs    Financial resource strain: Not hard at all    Food insecurity     Worry: Never true     Inability: Never true    Transportation needs     Medical: No     Non-medical: No   Tobacco Use    Smoking status: Never Smoker    Smokeless tobacco: Never Used   Substance and Sexual Activity    Alcohol use: Yes     Frequency: 4 or more times a week     Drinks per session: 5 or 6     Binge frequency: Weekly     Comment: occasional    Drug use: Not on file    Sexual activity: Not on file   Lifestyle    Physical activity     Days per week: 0 days     Minutes per session: 0 min    Stress: Rather much   Relationships    Social connections     Talks on phone: More than three times a week     Gets together: Once a week     Attends Religion service: Not on file     Active member of club or organization: No     Attends meetings of clubs or organizations: Never     Relationship status:    Other Topics Concern    Not on file   Social History Narrative    Not on file       Family History:  Family History   Adopted: Yes       ROS: No acute cardiac events, no acute respiratory complaints.     Physical Exam (all patients):    There were no vitals taken for this visit.  Lungs: Clear to auscultation bilaterally, respirations unlabored  Heart: Regular rate and rhythm, S1 and S2 normal, no obvious murmurs  Abdomen:         Soft, non-tender, bowel sounds normal, no masses, no organomegaly    Lab Results   Component Value Date    WBC 3.99 06/13/2019    MCV 87 06/13/2019    RDW 13.4 06/13/2019     (L) 06/13/2019     (H) 08/18/2020    HGBA1C 8.2 (H) 08/18/2020    BUN 10 08/18/2020     08/18/2020    K 4.0 08/18/2020     08/18/2020        SEDATION PLAN: per anesthesia      History reviewed, vital signs satisfactory, cardiopulmonary status satisfactory, sedation options, risks and plans have been  discussed with the patient in front of endoscopy staff.  All their questions were answered and the patient agrees to the sedation procedures as planned and the patient is deemed an appropriate candidate for the sedation as planned.    Procedure explained to patient, informed consent obtained and placed in chart.    Markell Kuhn  10/1/2020  6:54 AM

## 2020-10-01 NOTE — ANESTHESIA POSTPROCEDURE EVALUATION
Anesthesia Post Evaluation    Patient: Saúl Perez    Procedure(s) Performed: Procedure(s) (LRB):  ESOPHAGOGASTRODUODENOSCOPY (EGD) (N/A)    Final Anesthesia Type: general    Patient location during evaluation: PACU  Patient participation: Yes- Able to Participate  Level of consciousness: awake and alert and oriented  Post-procedure vital signs: reviewed and stable  Pain management: adequate  Airway patency: patent    PONV status at discharge: No PONV  Anesthetic complications: no      Cardiovascular status: blood pressure returned to baseline, stable and hemodynamically stable  Respiratory status: unassisted  Hydration status: euvolemic  Follow-up not needed.          Vitals Value Taken Time   /95 10/01/20 0850   Temp 36.7 °C (98.1 °F) 10/01/20 0850   Pulse 73 10/01/20 0850   Resp 15 10/01/20 0850   SpO2 96 % 10/01/20 0850         Event Time   Out of Recovery 08:50:00         Pain/Josy Score: Josy Score: 10 (10/1/2020  8:50 AM)

## 2020-10-01 NOTE — TRANSFER OF CARE
Anesthesia Transfer of Care Note    Patient: Saúl Perez    Procedure(s) Performed: Procedure(s) (LRB):  ESOPHAGOGASTRODUODENOSCOPY (EGD) (N/A)    Patient location: PACU    Anesthesia Type: MAC    Transport from OR: Transported from OR on room air with adequate spontaneous ventilation    Post pain: adequate analgesia    Post assessment: no apparent anesthetic complications and tolerated procedure well    Post vital signs: stable    Level of consciousness: awake    Nausea/Vomiting: no nausea/vomiting    Complications: none    Transfer of care protocol was followed      Last vitals:   Visit Vitals  BP (!) 144/84 (BP Location: Left arm, Patient Position: Sitting)   Pulse 77   Temp 36.5 °C (97.7 °F) (Temporal)   Resp 20   Ht 6' (1.829 m)   Wt 105.4 kg (232 lb 5.8 oz)   SpO2 (!) 94%   BMI 31.51 kg/m²

## 2020-10-01 NOTE — [employer] PATIENT INSTRUCTIONS
Discharge Summary/Instructions after an Endoscopic Procedure  Patient Name: Saúl Delvalle  Patient MRN: [id number]  Patient YOB: 1970 Thursday, October 1, 2020  Markell Kuhn MD  RESTRICTIONS:  During your procedure today, you received medications for sedation.  These   medications may affect your judgment, balance and coordination.  Therefore,   for 24 hours, you have the following restrictions:   - DO NOT drive a car, operate machinery, make legal/financial decisions,   sign important papers or drink alcohol.    ACTIVITY:  Today: no heavy lifting, straining or running due to procedural   sedation/anesthesia.  The following day: return to full activity including work.  DIET:  Eat and drink normally unless instructed otherwise.     TREATMENT FOR COMMON SIDE EFFECTS:  - Mild abdominal pain, nausea, belching, bloating or excessive gas:  rest,   eat lightly and use a heating pad.  - Sore Throat: treat with throat lozenges and/or gargle with warm salt   water.  - Because air was used during the procedure, expelling large amounts of air   from your rectum or belching is normal.  - If a bowel prep was taken, you may not have a bowel movement for 1-3 days.    This is normal.  SYMPTOMS TO WATCH FOR AND REPORT TO YOUR PHYSICIAN:  1. Abdominal pain or bloating, other than gas cramps.  2. Chest pain.  3. Back pain.  4. Signs of infection such as: chills or fever occurring within 24 hours   after the procedure.  5. Rectal bleeding, which would show as bright red, maroon, or black stools.   (A tablespoon of blood from the rectum is not serious, especially if   hemorrhoids are present.)  6. Vomiting.  7. Weakness or dizziness.  GO DIRECTLY TO THE NEAREST EMERGENCY ROOM IF YOU HAVE ANY OF THE FOLLOWING:      Difficulty breathing              Chills and/or fever over 101 F   Persistent vomiting and/or vomiting blood   Severe abdominal pain   Severe chest pain   Black, tarry stools   Bleeding- more than one  tablespoon   Any other symptom or condition that you feel may need urgent attention  Your doctor recommends these additional instructions:  If any biopsies were taken, your doctors clinic will contact you in 1 to 2   weeks with any results.  - Discharge patient to home.   - Resume previous diet.   - Continue present medications.   - Repeat upper endoscopy at the next available appointment.   - Resume anticoagulant at prior dose.  For questions, problems or results please call your physician Markell Kuhn MD at Work:  [phone]  If you have any questions about the above instructions, call the GI   department at [phone] or call the endoscopy unit at [phone]   from 7am until 3 pm.  [facility] [place], EMERGENCY ROOM PHONE NUMBER:   [phone]  IF A COMPLICATION OR EMERGENCY SITUATION ARISES AND YOU ARE UNABLE TO REACH   YOUR PHYSICIAN - GO DIRECTLY TO THE EMERGENCY ROOM.  I have read or have had read to me these discharge instructions for my   procedure and have received a written copy.  I understand these   instructions and will follow-up with my physician if I have any questions.     __________________________________       _____________________________________  Nurse Signature                                          Patient/Designated   Responsible Party Signature  Markell Kuhn MD  10/1/2020 8:22:33 AM  This report has been verified and signed electronically.  [employer]

## 2020-10-01 NOTE — PLAN OF CARE
Discharge instructions reviewed with pt and father, handouts given, verbalized understanding with no further questions at this time. Dr. Kuhn spoke to pt at bedside, reviewed procedure. Pt will be scheduled for another study as EGD had to be aborted due to food particles in stomach. MD telephone number provided per AVS sheet. VSS on RA, no pain or nausea noted, tolerating po fluids without difficulty, no other complaints noted. Fall precautions reviewed, consents in chart, PIV removed.

## 2020-10-02 DIAGNOSIS — K30 DELAYED GASTRIC EMPTYING: Primary | ICD-10-CM

## 2020-10-05 ENCOUNTER — PATIENT MESSAGE (OUTPATIENT)
Dept: GASTROENTEROLOGY | Facility: CLINIC | Age: 50
End: 2020-10-05

## 2020-10-13 ENCOUNTER — HOSPITAL ENCOUNTER (OUTPATIENT)
Dept: RADIOLOGY | Facility: HOSPITAL | Age: 50
Discharge: HOME | End: 2020-10-13
Attending: NURSE PRACTITIONER
Payer: COMMERCIAL

## 2020-10-13 DIAGNOSIS — K30 DELAYED GASTRIC EMPTYING: ICD-10-CM

## 2020-10-13 PROCEDURE — 78264 GASTRIC EMPTYING IMG STUDY: CPT | Mod: TC

## 2020-10-13 PROCEDURE — 78264 GASTRIC EMPTYING IMG STUDY: CPT | Mod: 26,,, | Performed by: RADIOLOGY

## 2020-10-13 PROCEDURE — A9541 TC99M SULFUR COLLOID: HCPCS

## 2020-10-13 PROCEDURE — 78264 NM GASTRIC EMPTYING: ICD-10-PCS | Mod: 26,,, | Performed by: RADIOLOGY

## 2021-02-09 ENCOUNTER — LAB VISIT (OUTPATIENT)
Dept: LAB | Facility: HOSPITAL | Age: 51
End: 2021-02-09
Attending: FAMILY MEDICINE
Payer: COMMERCIAL

## 2021-02-09 DIAGNOSIS — Z79.899 ENCOUNTER FOR LONG-TERM (CURRENT) USE OF MEDICATIONS: ICD-10-CM

## 2021-02-09 DIAGNOSIS — Z12.5 SCREENING FOR PROSTATE CANCER: ICD-10-CM

## 2021-02-09 DIAGNOSIS — E11.9 DIABETES MELLITUS WITHOUT COMPLICATION: ICD-10-CM

## 2021-02-09 DIAGNOSIS — Z00.00 WELL ADULT EXAM: ICD-10-CM

## 2021-02-09 DIAGNOSIS — Z11.59 NEED FOR HEPATITIS C SCREENING TEST: ICD-10-CM

## 2021-02-09 LAB
ALBUMIN SERPL BCP-MCNC: 3.8 G/DL (ref 3.5–5.2)
ALP SERPL-CCNC: 92 U/L (ref 55–135)
ALT SERPL W/O P-5'-P-CCNC: 87 U/L (ref 10–44)
ANION GAP SERPL CALC-SCNC: 12 MMOL/L (ref 8–16)
AST SERPL-CCNC: 136 U/L (ref 10–40)
BILIRUB SERPL-MCNC: 1.5 MG/DL (ref 0.1–1)
BUN SERPL-MCNC: 6 MG/DL (ref 6–20)
CALCIUM SERPL-MCNC: 9 MG/DL (ref 8.7–10.5)
CHLORIDE SERPL-SCNC: 99 MMOL/L (ref 95–110)
CHOLEST SERPL-MCNC: 222 MG/DL (ref 120–199)
CHOLEST/HDLC SERPL: 2 {RATIO} (ref 2–5)
CO2 SERPL-SCNC: 25 MMOL/L (ref 23–29)
CREAT SERPL-MCNC: 0.9 MG/DL (ref 0.5–1.4)
EST. GFR  (AFRICAN AMERICAN): >60 ML/MIN/1.73 M^2
EST. GFR  (NON AFRICAN AMERICAN): >60 ML/MIN/1.73 M^2
ESTIMATED AVG GLUCOSE: 206 MG/DL (ref 68–131)
GLUCOSE SERPL-MCNC: 262 MG/DL (ref 70–110)
HBA1C MFR BLD: 8.8 % (ref 4–5.6)
HDLC SERPL-MCNC: 111 MG/DL (ref 40–75)
HDLC SERPL: 50 % (ref 20–50)
LDLC SERPL CALC-MCNC: 87.8 MG/DL (ref 63–159)
NONHDLC SERPL-MCNC: 111 MG/DL
POTASSIUM SERPL-SCNC: 3.4 MMOL/L (ref 3.5–5.1)
PROT SERPL-MCNC: 7.7 G/DL (ref 6–8.4)
SODIUM SERPL-SCNC: 136 MMOL/L (ref 136–145)
TRIGL SERPL-MCNC: 116 MG/DL (ref 30–150)

## 2021-02-09 PROCEDURE — 36415 COLL VENOUS BLD VENIPUNCTURE: CPT

## 2021-02-09 PROCEDURE — 86803 HEPATITIS C AB TEST: CPT

## 2021-02-09 PROCEDURE — 84153 ASSAY OF PSA TOTAL: CPT

## 2021-02-09 PROCEDURE — 83036 HEMOGLOBIN GLYCOSYLATED A1C: CPT

## 2021-02-09 PROCEDURE — 80053 COMPREHEN METABOLIC PANEL: CPT

## 2021-02-09 PROCEDURE — 80061 LIPID PANEL: CPT

## 2021-02-10 LAB
COMPLEXED PSA SERPL-MCNC: 3.6 NG/ML (ref 0–4)
HCV AB SERPL QL IA: NEGATIVE

## 2021-03-08 ENCOUNTER — LAB VISIT (OUTPATIENT)
Dept: LAB | Facility: HOSPITAL | Age: 51
End: 2021-03-08
Attending: FAMILY MEDICINE
Payer: COMMERCIAL

## 2021-03-08 DIAGNOSIS — Z79.899 ENCOUNTER FOR LONG-TERM (CURRENT) USE OF MEDICATIONS: ICD-10-CM

## 2021-03-08 DIAGNOSIS — E11.9 DIABETES MELLITUS WITHOUT COMPLICATION: ICD-10-CM

## 2021-03-08 DIAGNOSIS — R89.9 ABNORMAL LABORATORY TEST: ICD-10-CM

## 2021-03-08 LAB — LIPASE SERPL-CCNC: 49 U/L (ref 4–60)

## 2021-03-08 PROCEDURE — 83690 ASSAY OF LIPASE: CPT | Performed by: FAMILY MEDICINE

## 2021-03-08 PROCEDURE — 36415 COLL VENOUS BLD VENIPUNCTURE: CPT | Performed by: FAMILY MEDICINE

## 2021-03-10 PROBLEM — Z00.00 WELL ADULT EXAM: Status: ACTIVE | Noted: 2021-03-10

## 2021-06-02 NOTE — PLAN OF CARE
[facility]  Physical Therapy Initial Evaluation    Name: Saúl Perez  Clinic Number: [id number]    Therapy Diagnosis:   Encounter Diagnosis   Name Primary?    Primary osteoarthritis of left knee      Physician: Art Whitt MD    Physician Orders: PT Eval and Treat   Medical Diagnosis from Referral: M17.12 (ICD-10-CM) - Primary osteoarthritis of left knee  Evaluation Date: 9/4/2020  Authorization Period Expiration: 12/31/2020  Plan of Care Expiration: 10/16/2020  Visit # / Visits authorized: 1/ 20  FOTO: 1/10      Time In: 200  Time Out: 245  Total Billable Time: 45 minutes    Precautions: Standard    Subjective   Date of onset: Previous week  History of current condition - Saúl reports: helping to move friend previous week and woke up next day with significant left knee pain and numbness. Pt reports pain runs all the way up to anterior hip as well as lateral hip. Pt reports weakness along left quadriceps as well and has had some buckling along left knee. Pt finds walking and squatting difficult. Pt does reports some numbness and tingling into heel and bottom of foot but not as bothersome as numbness along anterior knee.       Medical History:   Past Medical History:   Diagnosis Date    Anxiety     Chicken pox     History of alcohol abuse        Surgical History:   Saúl Perez  has a past surgical history that includes Hernia repair; Knee surgery; lipomas removed; Esophagogastroduodenoscopy; and Colonoscopy w/ polypectomy (07/01/2019).    Medications:   Saúl has a current medication list which includes the following prescription(s): aspirin, blood sugar diagnostic, blood-glucose meter, cyanocobalamin, doxycycline, dulaglutide, imiquimod, ketoconazole, lancets, lisinopril, lisinopril, meloxicam, metformin, multivitamin, omega-3 acid ethyl esters, omeprazole, prednisone, rosuvastatin, sertraline, tetrahydrozoline 0.05%, trazodone, and vitamin d.    Allergies:    Review of patient's allergies indicates:   Allergen Reactions    No known drug allergies         Imaging,Osteoarthritis, particularly noted in the medial compartment on the left.     Prior Therapy: Yes  Social History:  lives with their daughter  Occupation: Computer work  Prior Level of Function: able to complete ADL's with no difficulty  Current Level of Function: unable to complete ADL's without weakness and pain in left knee.     Pain:   Current 3/10, worst 8/10, best 0/10   Location: left knee   Description: Aching, Dull, Tingling and Numb  Aggravating Factors: all the time  Easing Factors: nothing    Pts goals: decrease pain, weakness, numbness in left knee    Objective     Posture: No significant deficits  Palpation: numbness in anterior knee, joint line pain, anterior hip tenderness  Sensation: numbness in anterior knee.     Range of Motion/Strength:     Hip Right Left Pain/Dysfunction with Movement   AROM/PROM WNL WNL      Knee Right Left Pain/Dysfunction with Movement   AROM/PROM      flexion 130 130    extension 0 0      Ankle Right Left Pain/Dysfunction with Movement   AROM/PROM WNL WNL      L/E MMT Right Left Pain/Dysfunction with Movement   Hip Flexion 5/5 3+/5    Hip Extension 5/5 5/5    Hip Abduction 5/5 4-/5    Hip Adduction 5/5 5/5    Knee Flexion 5/5 4/5    Knee Extension 5/5 3+/5    Ankle DF 5/5 5/5    Ankle PF 5/5 5/5      Special Tests: Femoral tension test (+) on left, Quadriceps weakness noted with SLR    Gait Analysis: Minimal antalgic gait but trendelenberg not noted.     CMS Impairment/Limitation/Restriction for FOTO Knee Survey    Therapist reviewed FOTO scores for Saúl Perez on 9/4/2020.   FOTO documents entered into [employer] - see Media section.    Limitation Score: 70%         TREATMENT   Treatment Time In: 220  Treatment Time Out: 245  Total Treatment time separate from Evaluation: 25 minutes    Saúl received therapeutic exercises to develop strength for 10 minutes  "including:  Quad sets 5" hold, 20x LLE  SLR 3x6 LLE  Supine hip ABD 5" hold, 30x      Saúl received the following manual therapy techniques: Myofacial release and Soft tissue Mobilization were applied to the: left knee, quadriceps, hip flexors for 15 minutes, including:  ASTYM to knee, quadriceps, all the way up to anterior hip     Home Exercises Provided and Patient Education Provided     Education provided:   - Quadriceps / hip flexor strengthening.     Written Home Exercises Provided: yes.  Exercises were reviewed and Saúl was able to demonstrate them prior to the end of the session.  Saúl demonstrated good  understanding of the education provided.     See EMR under Patient Instructions for exercises provided on evaluation.      Assessment   Saúl is a 50 y.o. male referred to outpatient Physical Therapy with a medical diagnosis of left knee pain. Pt presents with significant left hip flexor weakness and femoral nerve tension. Pt will benefit from hip flexor and quadriceps strengthening program.     Pt prognosis is Good.   Pt will benefit from skilled outpatient Physical Therapy to address the deficits stated above and in the chart below, provide pt/family education, and to maximize pt's level of independence.     Plan of care discussed with patient: Yes  Pt's spiritual, cultural and educational needs considered and patient is agreeable to the plan of care and goals as stated below:     Anticipated Barriers for therapy: None    Medical Necessity is demonstrated by the following  History  Co-morbidities and personal factors that may impact the plan of care Co-morbidities:   None    Personal Factors:   lifestyle     moderate   Examination  Body Structures and Functions, activity limitations and participation restrictions that may impact the plan of care Body Regions:   lower extremities    Body Systems:    strength  gross coordinated movement  balance  gait  transfers    Participation Restrictions: "   Walking    Activity limitations:   Learning and applying knowledge  no deficits    General Tasks and Commands  undertaking a single task    Communication  no deficits    Mobility  lifting and carrying objects  walking    Self care  no deficits    Domestic Life  doing house work (cleaning house, washing dishes, laundry)    Interactions/Relationships  no deficits    Life Areas  employment    Community and Social Life  community life  recreation and leisure         high   Clinical Presentation evolving clinical presentation with changing clinical characteristics moderate   Decision Making/ Complexity Score: moderate       Goals:  Long Term Goals (6 Weeks):  1. Pt will be independent with HEP to supplement PT in improving functional mobility  2. Pt will demonstrate proper VMO activation during quad set B to improve patellar tracking during plyometric tasks.   3. Pt will ascend 1 flight of stairs with no pain to promote functional mobility.   4. Pt will improve FOTO score to </= 40% limited to decrease perceived limitation with mobility  5. Pt will improve hip MMTs to >/= 4+/5 B to decrease load on knee joint during plyometric tasks.       Plan   Plan of care Certification: 9/4/2020 to 10/16/2020.    Outpatient Physical Therapy 2 times weekly for 6 weeks to include the following interventions: Gait Training, Manual Therapy, Moist Heat/ Ice, Neuromuscular Re-ed, Patient Education, Self Care, Therapeutic Activites and Therapeutic Exercise, ASTYM, Kinesiotaping PRN, Functional Dry Needling    Derrick Huitron, PT, DPT   loop recorder/yes(specify)

## 2021-06-14 PROBLEM — Z00.00 WELL ADULT EXAM: Status: RESOLVED | Noted: 2021-03-10 | Resolved: 2021-06-14

## 2021-07-12 ENCOUNTER — PATIENT MESSAGE (OUTPATIENT)
Dept: INTERNAL MEDICINE | Facility: CLINIC | Age: 51
End: 2021-07-12

## 2021-08-09 ENCOUNTER — OFFICE VISIT (OUTPATIENT)
Dept: OPHTHALMOLOGY | Facility: CLINIC | Age: 51
End: 2021-08-09
Payer: COMMERCIAL

## 2021-08-09 DIAGNOSIS — H52.4 HYPEROPIA WITH ASTIGMATISM AND PRESBYOPIA, BILATERAL: ICD-10-CM

## 2021-08-09 DIAGNOSIS — R73.03 PREDIABETES: Primary | ICD-10-CM

## 2021-08-09 DIAGNOSIS — H52.03 HYPEROPIA WITH ASTIGMATISM AND PRESBYOPIA, BILATERAL: ICD-10-CM

## 2021-08-09 DIAGNOSIS — H52.203 HYPEROPIA WITH ASTIGMATISM AND PRESBYOPIA, BILATERAL: ICD-10-CM

## 2021-08-09 PROCEDURE — 2023F DILAT RTA XM W/O RTNOPTHY: CPT | Mod: CPTII,S$GLB,, | Performed by: OPTOMETRIST

## 2021-08-09 PROCEDURE — 2023F PR DILATED RETINAL EXAM W/O EVID OF RETINOPATHY: ICD-10-PCS | Mod: CPTII,S$GLB,, | Performed by: OPTOMETRIST

## 2021-08-09 PROCEDURE — 1126F AMNT PAIN NOTED NONE PRSNT: CPT | Mod: CPTII,S$GLB,, | Performed by: OPTOMETRIST

## 2021-08-09 PROCEDURE — 1160F PR REVIEW ALL MEDS BY PRESCRIBER/CLIN PHARMACIST DOCUMENTED: ICD-10-PCS | Mod: CPTII,S$GLB,, | Performed by: OPTOMETRIST

## 2021-08-09 PROCEDURE — 1159F PR MEDICATION LIST DOCUMENTED IN MEDICAL RECORD: ICD-10-PCS | Mod: CPTII,S$GLB,, | Performed by: OPTOMETRIST

## 2021-08-09 PROCEDURE — 92015 PR REFRACTION: ICD-10-PCS | Mod: S$GLB,,, | Performed by: OPTOMETRIST

## 2021-08-09 PROCEDURE — 92004 COMPRE OPH EXAM NEW PT 1/>: CPT | Mod: S$GLB,,, | Performed by: OPTOMETRIST

## 2021-08-09 PROCEDURE — 99999 PR PBB SHADOW E&M-EST. PATIENT-LVL III: CPT | Mod: PBBFAC,,, | Performed by: OPTOMETRIST

## 2021-08-09 PROCEDURE — 1159F MED LIST DOCD IN RCRD: CPT | Mod: CPTII,S$GLB,, | Performed by: OPTOMETRIST

## 2021-08-09 PROCEDURE — 1126F PR PAIN SEVERITY QUANTIFIED, NO PAIN PRESENT: ICD-10-PCS | Mod: CPTII,S$GLB,, | Performed by: OPTOMETRIST

## 2021-08-09 PROCEDURE — 3052F HG A1C>EQUAL 8.0%<EQUAL 9.0%: CPT | Mod: CPTII,S$GLB,, | Performed by: OPTOMETRIST

## 2021-08-09 PROCEDURE — 92004 PR EYE EXAM, NEW PATIENT,COMPREHESV: ICD-10-PCS | Mod: S$GLB,,, | Performed by: OPTOMETRIST

## 2021-08-09 PROCEDURE — 92015 DETERMINE REFRACTIVE STATE: CPT | Mod: S$GLB,,, | Performed by: OPTOMETRIST

## 2021-08-09 PROCEDURE — 1160F RVW MEDS BY RX/DR IN RCRD: CPT | Mod: CPTII,S$GLB,, | Performed by: OPTOMETRIST

## 2021-08-09 PROCEDURE — 3052F PR MOST RECENT HEMOGLOBIN A1C LEVEL 8.0 - < 9.0%: ICD-10-PCS | Mod: CPTII,S$GLB,, | Performed by: OPTOMETRIST

## 2021-08-09 PROCEDURE — 99999 PR PBB SHADOW E&M-EST. PATIENT-LVL III: ICD-10-PCS | Mod: PBBFAC,,, | Performed by: OPTOMETRIST

## 2021-09-02 ENCOUNTER — LAB VISIT (OUTPATIENT)
Dept: LAB | Facility: HOSPITAL | Age: 51
End: 2021-09-02
Attending: FAMILY MEDICINE
Payer: COMMERCIAL

## 2021-09-02 ENCOUNTER — OFFICE VISIT (OUTPATIENT)
Dept: INTERNAL MEDICINE | Facility: CLINIC | Age: 51
End: 2021-09-02
Payer: COMMERCIAL

## 2021-09-02 ENCOUNTER — OFFICE VISIT (OUTPATIENT)
Dept: DERMATOLOGY | Facility: CLINIC | Age: 51
End: 2021-09-02
Payer: COMMERCIAL

## 2021-09-02 VITALS
DIASTOLIC BLOOD PRESSURE: 82 MMHG | HEART RATE: 90 BPM | BODY MASS INDEX: 29.54 KG/M2 | SYSTOLIC BLOOD PRESSURE: 144 MMHG | TEMPERATURE: 98 F | WEIGHT: 217.81 LBS | OXYGEN SATURATION: 97 %

## 2021-09-02 DIAGNOSIS — F41.9 ANXIETY AND DEPRESSION: ICD-10-CM

## 2021-09-02 DIAGNOSIS — L57.0 ACTINIC KERATOSIS: Primary | ICD-10-CM

## 2021-09-02 DIAGNOSIS — R79.89 ELEVATED LFTS: ICD-10-CM

## 2021-09-02 DIAGNOSIS — N41.9 PROSTATITIS, UNSPECIFIED PROSTATITIS TYPE: ICD-10-CM

## 2021-09-02 DIAGNOSIS — F32.A ANXIETY AND DEPRESSION: ICD-10-CM

## 2021-09-02 DIAGNOSIS — K21.9 GERD WITHOUT ESOPHAGITIS: ICD-10-CM

## 2021-09-02 DIAGNOSIS — Z12.83 SCREENING, MALIGNANT NEOPLASM, SKIN: ICD-10-CM

## 2021-09-02 DIAGNOSIS — Z85.828 HISTORY OF BASAL CELL CARCINOMA: ICD-10-CM

## 2021-09-02 DIAGNOSIS — I10 HYPERTENSION, UNSPECIFIED TYPE: ICD-10-CM

## 2021-09-02 DIAGNOSIS — L30.9 DERMATITIS: ICD-10-CM

## 2021-09-02 DIAGNOSIS — R97.20 ELEVATED PSA: ICD-10-CM

## 2021-09-02 LAB
ALBUMIN SERPL BCP-MCNC: 3.7 G/DL (ref 3.5–5.2)
ALP SERPL-CCNC: 102 U/L (ref 55–135)
ALT SERPL W/O P-5'-P-CCNC: 44 U/L (ref 10–44)
ANION GAP SERPL CALC-SCNC: 10 MMOL/L (ref 8–16)
AST SERPL-CCNC: 49 U/L (ref 10–40)
BILIRUB SERPL-MCNC: 0.6 MG/DL (ref 0.1–1)
BUN SERPL-MCNC: 10 MG/DL (ref 6–20)
CALCIUM SERPL-MCNC: 9.9 MG/DL (ref 8.7–10.5)
CHLORIDE SERPL-SCNC: 98 MMOL/L (ref 95–110)
CO2 SERPL-SCNC: 26 MMOL/L (ref 23–29)
CREAT SERPL-MCNC: 0.9 MG/DL (ref 0.5–1.4)
EST. GFR  (AFRICAN AMERICAN): >60 ML/MIN/1.73 M^2
EST. GFR  (NON AFRICAN AMERICAN): >60 ML/MIN/1.73 M^2
ESTIMATED AVG GLUCOSE: 280 MG/DL (ref 68–131)
GLUCOSE SERPL-MCNC: 494 MG/DL (ref 70–110)
HBA1C MFR BLD: 11.4 % (ref 4–5.6)
POTASSIUM SERPL-SCNC: 4.3 MMOL/L (ref 3.5–5.1)
PROT SERPL-MCNC: 7.7 G/DL (ref 6–8.4)
SODIUM SERPL-SCNC: 134 MMOL/L (ref 136–145)

## 2021-09-02 PROCEDURE — 4010F ACE/ARB THERAPY RXD/TAKEN: CPT | Mod: CPTII,S$GLB,, | Performed by: FAMILY MEDICINE

## 2021-09-02 PROCEDURE — 3052F HG A1C>EQUAL 8.0%<EQUAL 9.0%: CPT | Mod: CPTII,S$GLB,, | Performed by: STUDENT IN AN ORGANIZED HEALTH CARE EDUCATION/TRAINING PROGRAM

## 2021-09-02 PROCEDURE — 36415 COLL VENOUS BLD VENIPUNCTURE: CPT | Performed by: FAMILY MEDICINE

## 2021-09-02 PROCEDURE — 3060F POS MICROALBUMINURIA REV: CPT | Mod: CPTII,S$GLB,, | Performed by: FAMILY MEDICINE

## 2021-09-02 PROCEDURE — 3077F PR MOST RECENT SYSTOLIC BLOOD PRESSURE >= 140 MM HG: ICD-10-PCS | Mod: CPTII,S$GLB,, | Performed by: FAMILY MEDICINE

## 2021-09-02 PROCEDURE — 1160F RVW MEDS BY RX/DR IN RCRD: CPT | Mod: CPTII,S$GLB,, | Performed by: STUDENT IN AN ORGANIZED HEALTH CARE EDUCATION/TRAINING PROGRAM

## 2021-09-02 PROCEDURE — 4010F PR ACE/ARB THEARPY RXD/TAKEN: ICD-10-PCS | Mod: CPTII,S$GLB,, | Performed by: FAMILY MEDICINE

## 2021-09-02 PROCEDURE — 3066F PR DOCUMENTATION OF TREATMENT FOR NEPHROPATHY: ICD-10-PCS | Mod: CPTII,S$GLB,, | Performed by: FAMILY MEDICINE

## 2021-09-02 PROCEDURE — 17003 DESTRUCTION, PREMALIGNANT LESIONS; SECOND THROUGH 14 LESIONS: ICD-10-PCS | Mod: S$GLB,,, | Performed by: STUDENT IN AN ORGANIZED HEALTH CARE EDUCATION/TRAINING PROGRAM

## 2021-09-02 PROCEDURE — 99203 PR OFFICE/OUTPT VISIT, NEW, LEVL III, 30-44 MIN: ICD-10-PCS | Mod: 25,S$GLB,, | Performed by: STUDENT IN AN ORGANIZED HEALTH CARE EDUCATION/TRAINING PROGRAM

## 2021-09-02 PROCEDURE — 17003 DESTRUCT PREMALG LES 2-14: CPT | Mod: S$GLB,,, | Performed by: STUDENT IN AN ORGANIZED HEALTH CARE EDUCATION/TRAINING PROGRAM

## 2021-09-02 PROCEDURE — 99204 PR OFFICE/OUTPT VISIT, NEW, LEVL IV, 45-59 MIN: ICD-10-PCS | Mod: S$GLB,,, | Performed by: FAMILY MEDICINE

## 2021-09-02 PROCEDURE — 99204 OFFICE O/P NEW MOD 45 MIN: CPT | Mod: S$GLB,,, | Performed by: FAMILY MEDICINE

## 2021-09-02 PROCEDURE — 1159F MED LIST DOCD IN RCRD: CPT | Mod: CPTII,S$GLB,, | Performed by: STUDENT IN AN ORGANIZED HEALTH CARE EDUCATION/TRAINING PROGRAM

## 2021-09-02 PROCEDURE — 17000 PR DESTRUCTION(LASER SURGERY,CRYOSURGERY,CHEMOSURGERY),PREMALIGNANT LESIONS,FIRST LESION: ICD-10-PCS | Mod: S$GLB,,, | Performed by: STUDENT IN AN ORGANIZED HEALTH CARE EDUCATION/TRAINING PROGRAM

## 2021-09-02 PROCEDURE — 3008F PR BODY MASS INDEX (BMI) DOCUMENTED: ICD-10-PCS | Mod: CPTII,S$GLB,, | Performed by: FAMILY MEDICINE

## 2021-09-02 PROCEDURE — 99999 PR PBB SHADOW E&M-EST. PATIENT-LVL IV: ICD-10-PCS | Mod: PBBFAC,,, | Performed by: FAMILY MEDICINE

## 2021-09-02 PROCEDURE — 3008F BODY MASS INDEX DOCD: CPT | Mod: CPTII,S$GLB,, | Performed by: FAMILY MEDICINE

## 2021-09-02 PROCEDURE — 1160F PR REVIEW ALL MEDS BY PRESCRIBER/CLIN PHARMACIST DOCUMENTED: ICD-10-PCS | Mod: CPTII,S$GLB,, | Performed by: STUDENT IN AN ORGANIZED HEALTH CARE EDUCATION/TRAINING PROGRAM

## 2021-09-02 PROCEDURE — 99999 PR PBB SHADOW E&M-EST. PATIENT-LVL IV: CPT | Mod: PBBFAC,,, | Performed by: FAMILY MEDICINE

## 2021-09-02 PROCEDURE — 3079F PR MOST RECENT DIASTOLIC BLOOD PRESSURE 80-89 MM HG: ICD-10-PCS | Mod: CPTII,S$GLB,, | Performed by: FAMILY MEDICINE

## 2021-09-02 PROCEDURE — 99999 PR PBB SHADOW E&M-EST. PATIENT-LVL IV: CPT | Mod: PBBFAC,,, | Performed by: STUDENT IN AN ORGANIZED HEALTH CARE EDUCATION/TRAINING PROGRAM

## 2021-09-02 PROCEDURE — 3077F SYST BP >= 140 MM HG: CPT | Mod: CPTII,S$GLB,, | Performed by: FAMILY MEDICINE

## 2021-09-02 PROCEDURE — 99999 PR PBB SHADOW E&M-EST. PATIENT-LVL IV: ICD-10-PCS | Mod: PBBFAC,,, | Performed by: STUDENT IN AN ORGANIZED HEALTH CARE EDUCATION/TRAINING PROGRAM

## 2021-09-02 PROCEDURE — 99203 OFFICE O/P NEW LOW 30 MIN: CPT | Mod: 25,S$GLB,, | Performed by: STUDENT IN AN ORGANIZED HEALTH CARE EDUCATION/TRAINING PROGRAM

## 2021-09-02 PROCEDURE — 3046F PR MOST RECENT HEMOGLOBIN A1C LEVEL > 9.0%: ICD-10-PCS | Mod: CPTII,S$GLB,, | Performed by: FAMILY MEDICINE

## 2021-09-02 PROCEDURE — 17000 DESTRUCT PREMALG LESION: CPT | Mod: S$GLB,,, | Performed by: STUDENT IN AN ORGANIZED HEALTH CARE EDUCATION/TRAINING PROGRAM

## 2021-09-02 PROCEDURE — 3060F PR POS MICROALBUMINURIA RESULT DOCUMENTED/REVIEW: ICD-10-PCS | Mod: CPTII,S$GLB,, | Performed by: FAMILY MEDICINE

## 2021-09-02 PROCEDURE — 3066F NEPHROPATHY DOC TX: CPT | Mod: CPTII,S$GLB,, | Performed by: FAMILY MEDICINE

## 2021-09-02 PROCEDURE — 1159F PR MEDICATION LIST DOCUMENTED IN MEDICAL RECORD: ICD-10-PCS | Mod: CPTII,S$GLB,, | Performed by: STUDENT IN AN ORGANIZED HEALTH CARE EDUCATION/TRAINING PROGRAM

## 2021-09-02 PROCEDURE — 83036 HEMOGLOBIN GLYCOSYLATED A1C: CPT | Performed by: FAMILY MEDICINE

## 2021-09-02 PROCEDURE — 3046F HEMOGLOBIN A1C LEVEL >9.0%: CPT | Mod: CPTII,S$GLB,, | Performed by: FAMILY MEDICINE

## 2021-09-02 PROCEDURE — 3052F PR MOST RECENT HEMOGLOBIN A1C LEVEL 8.0 - < 9.0%: ICD-10-PCS | Mod: CPTII,S$GLB,, | Performed by: STUDENT IN AN ORGANIZED HEALTH CARE EDUCATION/TRAINING PROGRAM

## 2021-09-02 PROCEDURE — 3079F DIAST BP 80-89 MM HG: CPT | Mod: CPTII,S$GLB,, | Performed by: FAMILY MEDICINE

## 2021-09-02 PROCEDURE — 80053 COMPREHEN METABOLIC PANEL: CPT | Performed by: FAMILY MEDICINE

## 2021-09-02 RX ORDER — FLUTICASONE PROPIONATE 50 MCG
2 SPRAY, SUSPENSION (ML) NASAL DAILY
Qty: 16 G | Refills: 1 | Status: SHIPPED | OUTPATIENT
Start: 2021-09-02 | End: 2022-03-11

## 2021-09-02 RX ORDER — TRIAMCINOLONE ACETONIDE 1 MG/G
CREAM TOPICAL
Qty: 454 G | Refills: 0 | Status: SHIPPED | OUTPATIENT
Start: 2021-09-02 | End: 2023-02-09

## 2021-09-02 RX ORDER — CIPROFLOXACIN 500 MG/1
500 TABLET ORAL EVERY 12 HOURS
Qty: 60 TABLET | Refills: 0 | Status: SHIPPED | OUTPATIENT
Start: 2021-09-02 | End: 2021-10-02

## 2021-09-02 RX ORDER — MELOXICAM 15 MG/1
15 TABLET ORAL DAILY PRN
Qty: 30 TABLET | Refills: 0 | Status: SHIPPED | OUTPATIENT
Start: 2021-09-02 | End: 2022-02-22

## 2021-09-03 ENCOUNTER — TELEPHONE (OUTPATIENT)
Dept: INTERNAL MEDICINE | Facility: CLINIC | Age: 51
End: 2021-09-03

## 2021-09-03 ENCOUNTER — PATIENT MESSAGE (OUTPATIENT)
Dept: INTERNAL MEDICINE | Facility: CLINIC | Age: 51
End: 2021-09-03

## 2021-10-07 RX ORDER — DULAGLUTIDE 0.75 MG/.5ML
INJECTION, SOLUTION SUBCUTANEOUS
Qty: 6 PEN | Refills: 1 | Status: SHIPPED | OUTPATIENT
Start: 2021-10-07 | End: 2022-02-22

## 2021-11-08 ENCOUNTER — TELEPHONE (OUTPATIENT)
Dept: INTERNAL MEDICINE | Facility: CLINIC | Age: 51
End: 2021-11-08
Payer: COMMERCIAL

## 2021-11-26 ENCOUNTER — PATIENT MESSAGE (OUTPATIENT)
Dept: INTERNAL MEDICINE | Facility: CLINIC | Age: 51
End: 2021-11-26
Payer: COMMERCIAL

## 2021-12-08 ENCOUNTER — PATIENT MESSAGE (OUTPATIENT)
Dept: INTERNAL MEDICINE | Facility: CLINIC | Age: 51
End: 2021-12-08
Payer: COMMERCIAL

## 2021-12-08 DIAGNOSIS — E11.9 DIABETES MELLITUS WITHOUT COMPLICATION: ICD-10-CM

## 2021-12-08 DIAGNOSIS — I10 MODERATE HYPERTENSION: ICD-10-CM

## 2021-12-09 ENCOUNTER — TELEPHONE (OUTPATIENT)
Dept: INTERNAL MEDICINE | Facility: CLINIC | Age: 51
End: 2021-12-09
Payer: COMMERCIAL

## 2021-12-09 DIAGNOSIS — E11.9 DIABETES MELLITUS WITHOUT COMPLICATION: Primary | ICD-10-CM

## 2021-12-09 RX ORDER — LISINOPRIL 40 MG/1
40 TABLET ORAL DAILY
Qty: 90 TABLET | Refills: 4 | Status: SHIPPED | OUTPATIENT
Start: 2021-12-09 | End: 2022-12-30

## 2022-02-15 ENCOUNTER — PATIENT OUTREACH (OUTPATIENT)
Dept: ADMINISTRATIVE | Facility: HOSPITAL | Age: 52
End: 2022-02-15
Payer: MEDICAID

## 2022-02-15 NOTE — PROGRESS NOTES
Health Maintenance Due   Topic Date Due    Pneumococcal Vaccines (Age 0-64) (1 of 4 - PCV13) Never done    Shingles Vaccine (2 of 2) 08/12/2019    COVID-19 Vaccine (3 - Booster for Pfizer series) 11/02/2021    Lipid Panel  02/09/2022    Diabetes Urine Screening  02/09/2022     Patient has a lab appointment scheduled on 2/16/22, lipid order entered and linked to appointment. Patient has an appointment scheduled with PCP for follow up on 2/22/22.

## 2022-02-16 ENCOUNTER — LAB VISIT (OUTPATIENT)
Dept: LAB | Facility: HOSPITAL | Age: 52
End: 2022-02-16
Attending: FAMILY MEDICINE
Payer: MEDICAID

## 2022-02-16 DIAGNOSIS — E11.9 DIABETES MELLITUS WITHOUT COMPLICATION: ICD-10-CM

## 2022-02-16 LAB
ALT SERPL W/O P-5'-P-CCNC: 19 U/L (ref 10–44)
AST SERPL-CCNC: 24 U/L (ref 10–40)
CHOLEST SERPL-MCNC: 168 MG/DL (ref 120–199)
CHOLEST/HDLC SERPL: 2.4 {RATIO} (ref 2–5)
ESTIMATED AVG GLUCOSE: 246 MG/DL (ref 68–131)
HBA1C MFR BLD: 10.2 % (ref 4–5.6)
HDLC SERPL-MCNC: 70 MG/DL (ref 40–75)
HDLC SERPL: 41.7 % (ref 20–50)
LDLC SERPL CALC-MCNC: 57.2 MG/DL (ref 63–159)
NONHDLC SERPL-MCNC: 98 MG/DL
TRIGL SERPL-MCNC: 204 MG/DL (ref 30–150)

## 2022-02-16 PROCEDURE — 83036 HEMOGLOBIN GLYCOSYLATED A1C: CPT | Performed by: FAMILY MEDICINE

## 2022-02-16 PROCEDURE — 36415 COLL VENOUS BLD VENIPUNCTURE: CPT | Performed by: FAMILY MEDICINE

## 2022-02-16 PROCEDURE — 84460 ALANINE AMINO (ALT) (SGPT): CPT | Performed by: FAMILY MEDICINE

## 2022-02-16 PROCEDURE — 80061 LIPID PANEL: CPT | Performed by: FAMILY MEDICINE

## 2022-02-16 PROCEDURE — 84450 TRANSFERASE (AST) (SGOT): CPT | Performed by: FAMILY MEDICINE

## 2022-02-22 ENCOUNTER — TELEPHONE (OUTPATIENT)
Dept: NEUROLOGY | Facility: CLINIC | Age: 52
End: 2022-02-22
Payer: MEDICAID

## 2022-02-22 ENCOUNTER — OFFICE VISIT (OUTPATIENT)
Dept: INTERNAL MEDICINE | Facility: CLINIC | Age: 52
End: 2022-02-22
Payer: MEDICAID

## 2022-02-22 ENCOUNTER — TELEPHONE (OUTPATIENT)
Dept: INTERNAL MEDICINE | Facility: CLINIC | Age: 52
End: 2022-02-22
Payer: MEDICAID

## 2022-02-22 ENCOUNTER — TELEPHONE (OUTPATIENT)
Dept: CARDIOLOGY | Facility: CLINIC | Age: 52
End: 2022-02-22
Payer: MEDICAID

## 2022-02-22 VITALS
SYSTOLIC BLOOD PRESSURE: 122 MMHG | HEIGHT: 72 IN | DIASTOLIC BLOOD PRESSURE: 88 MMHG | WEIGHT: 230.81 LBS | OXYGEN SATURATION: 96 % | BODY MASS INDEX: 31.26 KG/M2 | HEART RATE: 98 BPM

## 2022-02-22 DIAGNOSIS — I10 HYPERTENSION, UNSPECIFIED TYPE: ICD-10-CM

## 2022-02-22 DIAGNOSIS — Z12.5 SCREENING FOR PROSTATE CANCER: ICD-10-CM

## 2022-02-22 DIAGNOSIS — R79.89 ELEVATED TROPONIN: ICD-10-CM

## 2022-02-22 DIAGNOSIS — R55 SYNCOPE, UNSPECIFIED SYNCOPE TYPE: ICD-10-CM

## 2022-02-22 DIAGNOSIS — K21.9 GERD WITHOUT ESOPHAGITIS: ICD-10-CM

## 2022-02-22 PROCEDURE — 99999 PR PBB SHADOW E&M-EST. PATIENT-LVL V: CPT | Mod: PBBFAC,,, | Performed by: FAMILY MEDICINE

## 2022-02-22 PROCEDURE — 3066F NEPHROPATHY DOC TX: CPT | Mod: CPTII,,, | Performed by: FAMILY MEDICINE

## 2022-02-22 PROCEDURE — 3046F HEMOGLOBIN A1C LEVEL >9.0%: CPT | Mod: CPTII,,, | Performed by: FAMILY MEDICINE

## 2022-02-22 PROCEDURE — 99999 PR PBB SHADOW E&M-EST. PATIENT-LVL V: ICD-10-PCS | Mod: PBBFAC,,, | Performed by: FAMILY MEDICINE

## 2022-02-22 PROCEDURE — 3061F PR NEG MICROALBUMINURIA RESULT DOCUMENTED/REVIEW: ICD-10-PCS | Mod: CPTII,,, | Performed by: FAMILY MEDICINE

## 2022-02-22 PROCEDURE — 3074F PR MOST RECENT SYSTOLIC BLOOD PRESSURE < 130 MM HG: ICD-10-PCS | Mod: CPTII,,, | Performed by: FAMILY MEDICINE

## 2022-02-22 PROCEDURE — 3079F DIAST BP 80-89 MM HG: CPT | Mod: CPTII,,, | Performed by: FAMILY MEDICINE

## 2022-02-22 PROCEDURE — 90471 IMMUNIZATION ADMIN: CPT | Mod: PBBFAC

## 2022-02-22 PROCEDURE — 3074F SYST BP LT 130 MM HG: CPT | Mod: CPTII,,, | Performed by: FAMILY MEDICINE

## 2022-02-22 PROCEDURE — 3008F PR BODY MASS INDEX (BMI) DOCUMENTED: ICD-10-PCS | Mod: CPTII,,, | Performed by: FAMILY MEDICINE

## 2022-02-22 PROCEDURE — 3066F PR DOCUMENTATION OF TREATMENT FOR NEPHROPATHY: ICD-10-PCS | Mod: CPTII,,, | Performed by: FAMILY MEDICINE

## 2022-02-22 PROCEDURE — 3079F PR MOST RECENT DIASTOLIC BLOOD PRESSURE 80-89 MM HG: ICD-10-PCS | Mod: CPTII,,, | Performed by: FAMILY MEDICINE

## 2022-02-22 PROCEDURE — 99214 OFFICE O/P EST MOD 30 MIN: CPT | Mod: S$PBB,,, | Performed by: FAMILY MEDICINE

## 2022-02-22 PROCEDURE — 3072F LOW RISK FOR RETINOPATHY: CPT | Mod: CPTII,,, | Performed by: FAMILY MEDICINE

## 2022-02-22 PROCEDURE — 4010F PR ACE/ARB THEARPY RXD/TAKEN: ICD-10-PCS | Mod: CPTII,,, | Performed by: FAMILY MEDICINE

## 2022-02-22 PROCEDURE — 3061F NEG MICROALBUMINURIA REV: CPT | Mod: CPTII,,, | Performed by: FAMILY MEDICINE

## 2022-02-22 PROCEDURE — 99215 OFFICE O/P EST HI 40 MIN: CPT | Mod: PBBFAC | Performed by: FAMILY MEDICINE

## 2022-02-22 PROCEDURE — 3072F PR LOW RISK FOR RETINOPATHY: ICD-10-PCS | Mod: CPTII,,, | Performed by: FAMILY MEDICINE

## 2022-02-22 PROCEDURE — 4010F ACE/ARB THERAPY RXD/TAKEN: CPT | Mod: CPTII,,, | Performed by: FAMILY MEDICINE

## 2022-02-22 PROCEDURE — 3046F PR MOST RECENT HEMOGLOBIN A1C LEVEL > 9.0%: ICD-10-PCS | Mod: CPTII,,, | Performed by: FAMILY MEDICINE

## 2022-02-22 PROCEDURE — 99214 PR OFFICE/OUTPT VISIT, EST, LEVL IV, 30-39 MIN: ICD-10-PCS | Mod: S$PBB,,, | Performed by: FAMILY MEDICINE

## 2022-02-22 PROCEDURE — 3008F BODY MASS INDEX DOCD: CPT | Mod: CPTII,,, | Performed by: FAMILY MEDICINE

## 2022-02-22 RX ORDER — DULAGLUTIDE 3 MG/.5ML
3 INJECTION, SOLUTION SUBCUTANEOUS
Qty: 4 PEN | Refills: 11 | Status: SHIPPED | OUTPATIENT
Start: 2022-05-02 | End: 2022-04-06 | Stop reason: SDUPTHER

## 2022-02-22 RX ORDER — LANOLIN ALCOHOL/MO/W.PET/CERES
1 CREAM (GRAM) TOPICAL DAILY
COMMUNITY
Start: 2021-10-09

## 2022-02-22 RX ORDER — MELOXICAM 15 MG/1
15 TABLET ORAL DAILY PRN
Qty: 30 TABLET | Refills: 3 | Status: SHIPPED | OUTPATIENT
Start: 2022-02-22 | End: 2022-11-16

## 2022-02-22 RX ORDER — INSULIN GLARGINE 100 [IU]/ML
INJECTION, SOLUTION SUBCUTANEOUS
COMMUNITY
Start: 2021-11-17 | End: 2022-06-23

## 2022-02-22 RX ORDER — HYDROXYZINE PAMOATE 25 MG/1
25 CAPSULE ORAL 4 TIMES DAILY PRN
COMMUNITY
Start: 2021-11-15 | End: 2023-06-22

## 2022-02-22 RX ORDER — FOLIC ACID 1 MG/1
1000 TABLET ORAL DAILY
COMMUNITY
Start: 2021-11-15

## 2022-02-22 RX ORDER — METHOCARBAMOL 500 MG/1
500 TABLET, FILM COATED ORAL 4 TIMES DAILY PRN
Qty: 40 TABLET | Refills: 2 | Status: SHIPPED | OUTPATIENT
Start: 2022-02-22 | End: 2022-03-04

## 2022-02-22 RX ORDER — DULAGLUTIDE 1.5 MG/.5ML
1.5 INJECTION, SOLUTION SUBCUTANEOUS
Qty: 4 PEN | Refills: 0 | Status: SHIPPED | OUTPATIENT
Start: 2022-03-29 | End: 2022-04-06 | Stop reason: SDUPTHER

## 2022-02-22 RX ORDER — LANCETS 30 GAUGE
EACH MISCELLANEOUS 2 TIMES DAILY
COMMUNITY
Start: 2021-11-10

## 2022-02-22 RX ORDER — DULAGLUTIDE 0.75 MG/.5ML
0.75 INJECTION, SOLUTION SUBCUTANEOUS
Qty: 4 PEN | Refills: 1 | Status: CANCELLED | OUTPATIENT
Start: 2022-02-22

## 2022-02-22 RX ORDER — PEN NEEDLE, DIABETIC 32GX 5/32"
NEEDLE, DISPOSABLE MISCELLANEOUS
COMMUNITY
Start: 2021-11-22

## 2022-02-22 NOTE — TELEPHONE ENCOUNTER
----- Message from Marguerite Lantigua LPN sent at 2/22/2022  1:38 PM CST -----  Regarding: appointment  Pt needs a new pt appt. Unable to schedule. Can you assist the pt with scheduling? Thank you.

## 2022-02-22 NOTE — TELEPHONE ENCOUNTER
Please reach out to patient to schedule a new pt appt. Unable to schedule. Thank you for your help.//belia

## 2022-02-22 NOTE — PROGRESS NOTES
Subjective:       Patient ID: Saúl Perez is a. male.    Chief Complaint: Multiple issues see below      HPI    Dm a1c 10; out of trulicty sinc oct  htn nl  Hx etoh abuse    elev lfts now nl        Inc psa velocity d/wd; due emily      Hx being Laid off  retired lt colonel;back to work. Travels a lot      10/21 in [place] ED for seiz vs syncope. Also elev troponin    Past Medical History:   Diagnosis Date    Chicken pox     History of alcohol abuse     HTN (hypertension)     Type 2 diabetes mellitus      Past Surgical History:   Procedure Laterality Date    COLONOSCOPY W/ POLYPECTOMY  07/01/2019        ESOPHAGOGASTRODUODENOSCOPY      ESOPHAGOGASTRODUODENOSCOPY N/A 10/1/2020    Procedure: ESOPHAGOGASTRODUODENOSCOPY (EGD);  Surgeon: Markell Kuhn MD;  Location: [facility];  Service: Gastroenterology;  Laterality: N/A;    HERNIA REPAIR      KNEE SURGERY      left knee scope    lipomas removed       Family History   Adopted: Yes     Social History     Socioeconomic History    Marital status:    Tobacco Use    Smoking status: Never Smoker    Smokeless tobacco: Current User     Types: Chew   Substance and Sexual Activity    Alcohol use: Yes     Comment: occasional   Social History Narrative    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   Constitutional:       Appearance: He is well-developed.   HENT:      Head: Normocephalic and atraumatic.      Right Ear: External ear normal.      Left Ear: External ear normal.      Nose: Nose normal.   Eyes:      General: No scleral icterus.     Conjunctiva/sclera: Conjunctivae normal.      Pupils: Pupils are equal, round, and reactive to light.   Neck:      Vascular: No carotid bruit.   Cardiovascular:       Rate and Rhythm: Normal rate and regular rhythm.      Heart sounds: Normal heart sounds. No murmur heard.   No friction rub. No gallop.    Pulmonary:      Effort: Pulmonary effort is normal.      Breath sounds: Normal breath sounds. No wheezing.   Abdominal:      General: Bowel sounds are normal. There is no distension.      Palpations: Abdomen is soft. There is no mass.      Tenderness: There is no abdominal tenderness. There is no guarding or rebound.   Musculoskeletal:         General: No tenderness. Normal range of motion.      Cervical back: Normal range of motion and neck supple.   Lymphadenopathy:      Cervical: No cervical adenopathy.   Skin:     General: Skin is warm and dry.      Findings: No erythema or rash.   Neurological:      Mental Status: He is alert and oriented to person, place, and time.      Cranial Nerves: No cranial nerve deficit.      Coordination: Coordination normal.   Psychiatric:         Behavior: Behavior normal.         Thought Content: Thought content normal.         Judgment: Judgment normal.       d      Assessment:       1. Uncontrolled diabetes mellitus with kidney complication, without long-term current use of insulin    2. Hypertension, unspecified type    3. GERD without esophagitis    4. Anxiety and depression    5.          seiz vs syncope  elev troponin  Plan:    no chwing tobacco      etoh to zero  Titrate trlicity from .75 to 1.5 to 3 mg    Uncontrolled diabetes mellitus with kidney complication, without long-term current use of insulin  -     Hemoglobin A1C; Future; Expected date: 05/23/2022    Hypertension, unspecified type    GERD without esophagitis    Screening for prostate cancer  -     PSA, Screening; Future; Expected date: 05/23/2022    Syncope, unspecified syncope type  -     Ambulatory referral/consult to Cardiology; Future; Expected date: 03/01/2022  -     Ambulatory referral/consult to Neurology; Future; Expected date: 03/01/2022    Elevated troponin  -      Ambulatory referral/consult to Cardiology; Future; Expected date: 03/01/2022    Other orders  -     meloxicam (MOBIC) 15 MG tablet; Take 1 tablet (15 mg total) by mouth daily as needed for Pain.  Dispense: 30 tablet; Refill: 3  -     methocarbamoL (ROBAXIN) 500 MG Tab; Take 1 tablet (500 mg total) by mouth 4 (four) times daily as needed.  Dispense: 40 tablet; Refill: 2  -     dulaglutide (TRULICITY) 1.5 mg/0.5 mL pen injector; Inject 1.5 mg into the skin every 7 days.  Dispense: 4 pen; Refill: 0  -     dulaglutide (TRULICITY) 3 mg/0.5 mL pen injector; Inject 3 mg into the skin every 7 days.  Dispense: 4 pen; Refill: 11  -     dulaglutide (TRULICITY) 0.75 mg/0.5 mL pen injector; Inject 0.75 mg into the skin every 7 days.  Dispense: 4 pen; Refill: 0  -     (In Office Administered) Pneumococcal Polysaccharide Vaccine (23 Valent) (SQ/IM)    ab and follow up after in 4 months  Neurology virtual or in person

## 2022-02-23 DIAGNOSIS — I10 HYPERTENSION, UNSPECIFIED TYPE: Primary | ICD-10-CM

## 2022-02-24 ENCOUNTER — OFFICE VISIT (OUTPATIENT)
Dept: CARDIOLOGY | Facility: CLINIC | Age: 52
End: 2022-02-24
Payer: MEDICAID

## 2022-02-24 ENCOUNTER — HOSPITAL ENCOUNTER (OUTPATIENT)
Dept: CARDIOLOGY | Facility: HOSPITAL | Age: 52
Discharge: HOME | End: 2022-02-24
Attending: INTERNAL MEDICINE
Payer: MEDICAID

## 2022-02-24 VITALS
BODY MASS INDEX: 31.33 KG/M2 | DIASTOLIC BLOOD PRESSURE: 84 MMHG | HEART RATE: 98 BPM | OXYGEN SATURATION: 97 % | WEIGHT: 231 LBS | SYSTOLIC BLOOD PRESSURE: 136 MMHG

## 2022-02-24 DIAGNOSIS — F10.11 HISTORY OF ALCOHOL ABUSE: ICD-10-CM

## 2022-02-24 DIAGNOSIS — R79.89 ELEVATED TROPONIN: Primary | ICD-10-CM

## 2022-02-24 DIAGNOSIS — R55 SYNCOPE, UNSPECIFIED SYNCOPE TYPE: ICD-10-CM

## 2022-02-24 DIAGNOSIS — I10 HYPERTENSION, UNSPECIFIED TYPE: ICD-10-CM

## 2022-02-24 DIAGNOSIS — I10 PRIMARY HYPERTENSION: ICD-10-CM

## 2022-02-24 PROCEDURE — 1160F RVW MEDS BY RX/DR IN RCRD: CPT | Mod: CPTII,,, | Performed by: INTERNAL MEDICINE

## 2022-02-24 PROCEDURE — 3079F DIAST BP 80-89 MM HG: CPT | Mod: CPTII,,, | Performed by: INTERNAL MEDICINE

## 2022-02-24 PROCEDURE — 3008F BODY MASS INDEX DOCD: CPT | Mod: CPTII,,, | Performed by: INTERNAL MEDICINE

## 2022-02-24 PROCEDURE — 93010 ELECTROCARDIOGRAM REPORT: CPT | Mod: ,,, | Performed by: STUDENT IN AN ORGANIZED HEALTH CARE EDUCATION/TRAINING PROGRAM

## 2022-02-24 PROCEDURE — 99999 PR PBB SHADOW E&M-EST. PATIENT-LVL IV: ICD-10-PCS | Mod: PBBFAC,,, | Performed by: INTERNAL MEDICINE

## 2022-02-24 PROCEDURE — 3061F NEG MICROALBUMINURIA REV: CPT | Mod: CPTII,,, | Performed by: INTERNAL MEDICINE

## 2022-02-24 PROCEDURE — 3066F NEPHROPATHY DOC TX: CPT | Mod: CPTII,,, | Performed by: INTERNAL MEDICINE

## 2022-02-24 PROCEDURE — 3079F PR MOST RECENT DIASTOLIC BLOOD PRESSURE 80-89 MM HG: ICD-10-PCS | Mod: CPTII,,, | Performed by: INTERNAL MEDICINE

## 2022-02-24 PROCEDURE — 4010F ACE/ARB THERAPY RXD/TAKEN: CPT | Mod: CPTII,,, | Performed by: INTERNAL MEDICINE

## 2022-02-24 PROCEDURE — 3075F PR MOST RECENT SYSTOLIC BLOOD PRESS GE 130-139MM HG: ICD-10-PCS | Mod: CPTII,,, | Performed by: INTERNAL MEDICINE

## 2022-02-24 PROCEDURE — 99214 OFFICE O/P EST MOD 30 MIN: CPT | Mod: PBBFAC | Performed by: INTERNAL MEDICINE

## 2022-02-24 PROCEDURE — 3061F PR NEG MICROALBUMINURIA RESULT DOCUMENTED/REVIEW: ICD-10-PCS | Mod: CPTII,,, | Performed by: INTERNAL MEDICINE

## 2022-02-24 PROCEDURE — 3046F HEMOGLOBIN A1C LEVEL >9.0%: CPT | Mod: CPTII,,, | Performed by: INTERNAL MEDICINE

## 2022-02-24 PROCEDURE — 3072F LOW RISK FOR RETINOPATHY: CPT | Mod: CPTII,,, | Performed by: INTERNAL MEDICINE

## 2022-02-24 PROCEDURE — 3066F PR DOCUMENTATION OF TREATMENT FOR NEPHROPATHY: ICD-10-PCS | Mod: CPTII,,, | Performed by: INTERNAL MEDICINE

## 2022-02-24 PROCEDURE — 1159F PR MEDICATION LIST DOCUMENTED IN MEDICAL RECORD: ICD-10-PCS | Mod: CPTII,,, | Performed by: INTERNAL MEDICINE

## 2022-02-24 PROCEDURE — 99205 PR OFFICE/OUTPT VISIT, NEW, LEVL V, 60-74 MIN: ICD-10-PCS | Mod: S$PBB,,, | Performed by: INTERNAL MEDICINE

## 2022-02-24 PROCEDURE — 3008F PR BODY MASS INDEX (BMI) DOCUMENTED: ICD-10-PCS | Mod: CPTII,,, | Performed by: INTERNAL MEDICINE

## 2022-02-24 PROCEDURE — 3075F SYST BP GE 130 - 139MM HG: CPT | Mod: CPTII,,, | Performed by: INTERNAL MEDICINE

## 2022-02-24 PROCEDURE — 1159F MED LIST DOCD IN RCRD: CPT | Mod: CPTII,,, | Performed by: INTERNAL MEDICINE

## 2022-02-24 PROCEDURE — 93005 ELECTROCARDIOGRAM TRACING: CPT

## 2022-02-24 PROCEDURE — 99999 PR PBB SHADOW E&M-EST. PATIENT-LVL IV: CPT | Mod: PBBFAC,,, | Performed by: INTERNAL MEDICINE

## 2022-02-24 PROCEDURE — 3072F PR LOW RISK FOR RETINOPATHY: ICD-10-PCS | Mod: CPTII,,, | Performed by: INTERNAL MEDICINE

## 2022-02-24 PROCEDURE — 93010 EKG 12-LEAD: ICD-10-PCS | Mod: ,,, | Performed by: STUDENT IN AN ORGANIZED HEALTH CARE EDUCATION/TRAINING PROGRAM

## 2022-02-24 PROCEDURE — 3046F PR MOST RECENT HEMOGLOBIN A1C LEVEL > 9.0%: ICD-10-PCS | Mod: CPTII,,, | Performed by: INTERNAL MEDICINE

## 2022-02-24 PROCEDURE — 99205 OFFICE O/P NEW HI 60 MIN: CPT | Mod: S$PBB,,, | Performed by: INTERNAL MEDICINE

## 2022-02-24 PROCEDURE — 4010F PR ACE/ARB THEARPY RXD/TAKEN: ICD-10-PCS | Mod: CPTII,,, | Performed by: INTERNAL MEDICINE

## 2022-02-24 PROCEDURE — 1160F PR REVIEW ALL MEDS BY PRESCRIBER/CLIN PHARMACIST DOCUMENTED: ICD-10-PCS | Mod: CPTII,,, | Performed by: INTERNAL MEDICINE

## 2022-02-24 NOTE — PROGRESS NOTES
Subjective:   Patient ID:  Saúl Perez is a 52 y.o. male who presents for evaluation of No chief complaint on file.      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    admitted to ER at [place] after syncope high BS and seziure, no cardiology f/u sicne d/c. In the ER troponin 19 and 20 . Troponin delta negative. ekg negative for acute change. No available normal range of troponin but low level  Today Ekg nsr  No chest pain palpitation dyspnea and faint       Past Medical History:   Diagnosis Date    Chicken pox     History of alcohol abuse     HTN (hypertension)     Type 2 diabetes mellitus        Past Surgical History:   Procedure Laterality Date    COLONOSCOPY W/ POLYPECTOMY  07/01/2019        ESOPHAGOGASTRODUODENOSCOPY      ESOPHAGOGASTRODUODENOSCOPY N/A 10/1/2020    Procedure: ESOPHAGOGASTRODUODENOSCOPY (EGD);  Surgeon: Markell Kuhn MD;  Location: [facility];  Service: Gastroenterology;  Laterality: N/A;    HERNIA REPAIR      KNEE SURGERY      left knee scope    lipomas removed         Social History     Tobacco Use    Smoking status: Never Smoker    Smokeless tobacco: Current User     Types: Chew   Substance Use Topics    Alcohol use: Yes     Comment: occasional       Family History   Adopted: Yes       Review of Systems   Constitutional: Negative for decreased appetite, diaphoresis, fever, malaise/fatigue and night sweats.   HENT: Negative for nosebleeds.    Eyes: Negative for blurred vision and double vision.   Cardiovascular: Negative for chest pain, claudication, dyspnea on exertion, irregular heartbeat, leg swelling, near-syncope, orthopnea, palpitations, paroxysmal nocturnal dyspnea and syncope.   Respiratory: Negative for cough, shortness of breath, sleep disturbances due to breathing, snoring, sputum production and wheezing.     Endocrine: Negative for cold intolerance and polyuria.   Hematologic/Lymphatic: Does not bruise/bleed easily.   Skin: Negative for rash.   Musculoskeletal: Negative for back pain, falls, joint pain, joint swelling and neck pain.   Gastrointestinal: Negative for abdominal pain, heartburn, nausea and vomiting.   Genitourinary: Negative for dysuria, frequency and hematuria.   Neurological: Negative for difficulty with concentration, dizziness, focal weakness, headaches, light-headedness, numbness, seizures and weakness.   Psychiatric/Behavioral: Negative for depression, memory loss and substance abuse. The patient does not have insomnia.    Allergic/Immunologic: Negative for HIV exposure and hives.       Objective:   Physical Exam  HENT:      Head: Normocephalic.   Eyes:      Pupils: Pupils are equal, round, and reactive to light.   Neck:      Thyroid: No thyromegaly.      Vascular: Normal carotid pulses. No carotid bruit or JVD.   Cardiovascular:      Rate and Rhythm: Normal rate and regular rhythm.  No extrasystoles are present.     Chest Wall: PMI is not displaced.      Pulses: Normal pulses.      Heart sounds: Normal heart sounds. No murmur heard.    No gallop. No S3 sounds.   Pulmonary:      Effort: No respiratory distress.      Breath sounds: Normal breath sounds. No stridor.   Abdominal:      General: Bowel sounds are normal.      Palpations: Abdomen is soft.      Tenderness: There is no abdominal tenderness. There is no rebound.   Musculoskeletal:         General: Normal range of motion.   Skin:     Findings: No rash.   Neurological:      Mental Status: He is alert and oriented to person, place, and time.   Psychiatric:         Behavior: Behavior normal.         Lab Results   Component Value Date    CHOL 168 02/16/2022    CHOL 222 (H) 02/09/2021    CHOL 146 08/18/2020     Lab Results   Component Value Date    HDL 70 02/16/2022     (H) 02/09/2021    HDL 74 08/18/2020     Lab Results   Component Value  Date    LDLCALC 57.2 (L) 02/16/2022    LDLCALC 87.8 02/09/2021    LDLCALC 48.8 (L) 08/18/2020     Lab Results   Component Value Date    TRIG 204 (H) 02/16/2022    TRIG 116 02/09/2021    TRIG 116 08/18/2020     Lab Results   Component Value Date    CHOLHDL 41.7 02/16/2022    CHOLHDL 50.0 02/09/2021    CHOLHDL 50.7 (H) 08/18/2020       Chemistry        Component Value Date/Time     (L) 09/02/2021 1229    K 4.3 09/02/2021 1229    CL 98 09/02/2021 1229    CO2 26 09/02/2021 1229    BUN 10 09/02/2021 1229    CREATININE 0.9 09/02/2021 1229     (HH) 09/02/2021 1229        Component Value Date/Time    CALCIUM 9.9 09/02/2021 1229    ALKPHOS 102 09/02/2021 1229    AST 24 02/16/2022 0800    AST 29 04/28/2016 0815    ALT 19 02/16/2022 0800    BILITOT 0.6 09/02/2021 1229    ESTGFRAFRICA >60.0 09/02/2021 1229    EGFRNONAA >60.0 09/02/2021 1229          Lab Results   Component Value Date    HGBA1C 10.2 (H) 02/16/2022     Lab Results   Component Value Date    TSH 5.180 (H) 06/13/2019     No results found for: INR, PROTIME  Lab Results   Component Value Date    WBC 3.99 06/13/2019    HGB 12.9 (L) 06/13/2019    HCT 35.9 (L) 06/13/2019    MCV 87 06/13/2019     (L) 06/13/2019     BNP  @LABRCNTIP(BNP,BNPTRIAGEBLO)@  CrCl cannot be calculated (Patient's most recent lab result is older than the maximum 7 days allowed.).  No results found in the last 24 hours.  No results found in the last 24 hours.  No results found in the last 24 hours.    Assessment:      1. Elevated troponin    2. Primary hypertension    3. Uncontrolled diabetes mellitus with kidney complication, without long-term current use of insulin    4. History of alcohol abuse    5. Syncope, unspecified syncope type        Plan:   Echo and ETT for elevated troponin h/o HTN Dm and alcohol abuse  Continue ASA Lipitor and Lisinopril  Alcohol cessation    DM Rx per PCP  Counseled DASH  Check Lipid profile in 6 months  Recommend heart-healthy diet, weight control  and regular exercise.  Tameka. Risk modification.   I have reviewed all pertinent labs and cardiac studies independently. Plans and recommendations have been formulated under my direct supervision. All questions answered and patient voiced understanding.   If symptoms persist go to the ED  F/u with PCP

## 2022-03-04 NOTE — TELEPHONE ENCOUNTER
No new care gaps identified.  Powered by [employer]. Reference number: [id number].   3/04/2022 3:10:18 PM CST

## 2022-03-06 ENCOUNTER — PATIENT MESSAGE (OUTPATIENT)
Dept: CARDIOLOGY | Facility: CLINIC | Age: 52
End: 2022-03-06
Payer: MEDICAID

## 2022-03-07 ENCOUNTER — PATIENT MESSAGE (OUTPATIENT)
Dept: INTERNAL MEDICINE | Facility: CLINIC | Age: 52
End: 2022-03-07
Payer: MEDICAID

## 2022-03-09 NOTE — TELEPHONE ENCOUNTER
No new care gaps identified.  Powered by [employer]. Reference number: [id number].   3/09/2022 3:32:17 PM CST

## 2022-03-11 RX ORDER — FLUTICASONE PROPIONATE 50 MCG
2 SPRAY, SUSPENSION (ML) NASAL DAILY
Qty: 48 G | Refills: 3 | Status: SHIPPED | OUTPATIENT
Start: 2022-03-11 | End: 2023-05-01

## 2022-03-11 NOTE — TELEPHONE ENCOUNTER
Refill Authorization Note   Saúl Perez  is requesting a refill authorization.  Brief Assessment and Rationale for Refill:  Approve     Medication Therapy Plan:       Medication Reconciliation Completed: No   Comments:   --->Care Gap information included below if applicable.   Orders Placed This Encounter    fluticasone propionate (FLONASE) 50 mcg/actuation nasal spray      Requested Prescriptions   Signed Prescriptions Disp Refills    fluticasone propionate (FLONASE) 50 mcg/actuation nasal spray 48 g 3     Si sprays (100 mcg total) by Each Nostril route Daily.       Ear, Nose, and Throat: Nasal Preparations - Corticosteroids Passed - 3/4/2022  3:09 PM        Passed - Patient is at least 18 years old        Passed - Valid encounter within last 15 months     Recent Visits  Date Type Provider Dept   22 Office Visit Juan Daniel Long MD [facility] Internal Medicine   20 Office Visit Kartik Ibarra, DO [facility] [address]   20 Office Visit Kartik Ibarra, DO [facility] [address]   Showing recent visits within past 720 days and meeting all other requirements  Future Appointments  No visits were found meeting these conditions.  Showing future appointments within next 150 days and meeting all other requirements      Future Appointments              In 5 days ECHOCARDIOGRAM, [facility] [place] [facility] Cardiology,  [place]    In 5 days EXERCISETREADMILL, [facility] [place] - Cardiology, [facility]    In 3 weeks Gavino Colón MD [facility] Neurology, [facility]    In 3 months LABORATORY, [facility] [place] - Lab 1st Fl, [place]    In 3 months Juan Daniel Long MD [facility] [place] - Internal Med 2nd Fl, [place]                    Appointments  past 12m or future 3m with PCP    Date Provider   Last Visit   2022 Juan Dnaiel Long MD   Next Visit   3/9/2022 Juan Daniel Long MD   ED visits in past 90 days: 0     Note composed:4:54 PM 2022

## 2022-03-16 ENCOUNTER — HOSPITAL ENCOUNTER (OUTPATIENT)
Dept: CARDIOLOGY | Facility: HOSPITAL | Age: 52
Discharge: HOME | End: 2022-03-16
Attending: INTERNAL MEDICINE
Payer: MEDICAID

## 2022-03-16 VITALS
WEIGHT: 231 LBS | HEIGHT: 72 IN | DIASTOLIC BLOOD PRESSURE: 84 MMHG | SYSTOLIC BLOOD PRESSURE: 136 MMHG | BODY MASS INDEX: 31.29 KG/M2

## 2022-03-16 DIAGNOSIS — I10 PRIMARY HYPERTENSION: ICD-10-CM

## 2022-03-16 DIAGNOSIS — R79.89 ELEVATED TROPONIN: ICD-10-CM

## 2022-03-16 LAB
AORTIC ROOT ANNULUS: 3.49 CM
AV INDEX (PROSTH): 0.68
AV MEAN GRADIENT: 4 MMHG
AV PEAK GRADIENT: 7 MMHG
AV VALVE AREA: 2.07 CM2
AV VELOCITY RATIO: 0.66
BSA FOR ECHO PROCEDURE: 2.31 M2
CV ECHO LV RWT: 0.48 CM
CV STRESS BASE HR: 108 BPM
DIASTOLIC BLOOD PRESSURE: 97 MMHG
DOP CALC AO PEAK VEL: 1.34 M/S
DOP CALC AO VTI: 20.6 CM
DOP CALC LVOT AREA: 3 CM2
DOP CALC LVOT DIAMETER: 1.97 CM
DOP CALC LVOT PEAK VEL: 0.88 M/S
DOP CALC LVOT STROKE VOLUME: 42.65 CM3
DOP CALC RVOT PEAK VEL: 0.95 M/S
DOP CALC RVOT VTI: 16.3 CM
DOP CALCLVOT PEAK VEL VTI: 14 CM
E WAVE DECELERATION TIME: 151.64 MSEC
E/A RATIO: 0.61
ECHO EF ESTIMATED: 52 %
ECHO LV POSTERIOR WALL: 0.98 CM (ref 0.6–1.1)
EJECTION FRACTION: 65 %
FRACTIONAL SHORTENING: 26 % (ref 28–44)
INTERVENTRICULAR SEPTUM: 1.1 CM (ref 0.6–1.1)
IVRT: 85.63 MSEC
LA WIDTH: 3.67 CM
LEFT ATRIUM SIZE: 2.55 CM
LEFT ATRIUM VOLUME INDEX MOD: 10 ML/M2
LEFT ATRIUM VOLUME MOD: 22.7 CM3
LEFT INTERNAL DIMENSION IN SYSTOLE: 3 CM (ref 2.1–4)
LEFT VENTRICLE DIASTOLIC VOLUME INDEX: 32.53 ML/M2
LEFT VENTRICLE DIASTOLIC VOLUME: 73.51 ML
LEFT VENTRICLE MASS INDEX: 61 G/M2
LEFT VENTRICLE SYSTOLIC VOLUME INDEX: 15.5 ML/M2
LEFT VENTRICLE SYSTOLIC VOLUME: 35.12 ML
LEFT VENTRICULAR INTERNAL DIMENSION IN DIASTOLE: 4.08 CM (ref 3.5–6)
LEFT VENTRICULAR MASS: 138.57 G
LVOT MG: 1.77 MMHG
LVOT MV: 0.63 CM/S
MV PEAK A VEL: 1.01 M/S
MV PEAK E VEL: 0.62 M/S
MV STENOSIS PRESSURE HALF TIME: 43.97 MS
MV VALVE AREA P 1/2 METHOD: 5 CM2
OHS CV CPX 1 MINUTE RECOVERY HEART RATE: 136 BPM
OHS CV CPX 85 PERCENT MAX PREDICTED HEART RATE MALE: 141
OHS CV CPX ESTIMATED METS: 8
OHS CV CPX MAX PREDICTED HEART RATE: 166
OHS CV CPX PATIENT IS FEMALE: 0
OHS CV CPX PATIENT IS MALE: 1
OHS CV CPX PEAK DIASTOLIC BLOOD PRESSURE: 91 MMHG
OHS CV CPX PEAK HEAR RATE: 146 BPM
OHS CV CPX PEAK RATE PRESSURE PRODUCT: NORMAL
OHS CV CPX PEAK SYSTOLIC BLOOD PRESSURE: 185 MMHG
OHS CV CPX PERCENT MAX PREDICTED HEART RATE ACHIEVED: 88
OHS CV CPX RATE PRESSURE PRODUCT PRESENTING: NORMAL
PISA TR MAX VEL: 2.84 M/S
PV MEAN GRADIENT: 2.17 MMHG
PV PEAK VELOCITY: 1.02 CM/S
RA MAJOR: 3.77 CM
RA PRESSURE: 3 MMHG
RIGHT VENTRICULAR END-DIASTOLIC DIMENSION: 2.29 CM
STJ: 2.54 CM
STRESS ECHO POST EXERCISE DUR MIN: 6 MINUTES
STRESS ECHO POST EXERCISE DUR SEC: 30 SECONDS
SYSTOLIC BLOOD PRESSURE: 139 MMHG
TR MAX PG: 32 MMHG
TV REST PULMONARY ARTERY PRESSURE: 35 MMHG

## 2022-03-16 PROCEDURE — 93306 TTE W/DOPPLER COMPLETE: CPT

## 2022-03-16 PROCEDURE — 93306 TTE W/DOPPLER COMPLETE: CPT | Mod: 26,,, | Performed by: INTERNAL MEDICINE

## 2022-03-16 PROCEDURE — 93016 CV STRESS TEST SUPVJ ONLY: CPT | Mod: ,,, | Performed by: INTERNAL MEDICINE

## 2022-03-16 PROCEDURE — 93018 CV STRESS TEST I&R ONLY: CPT | Mod: ,,, | Performed by: INTERNAL MEDICINE

## 2022-03-16 PROCEDURE — 93306 ECHO (CUPID ONLY): ICD-10-PCS | Mod: 26,,, | Performed by: INTERNAL MEDICINE

## 2022-03-16 PROCEDURE — 93016 EXERCISE STRESS - EKG (CUPID ONLY): ICD-10-PCS | Mod: ,,, | Performed by: INTERNAL MEDICINE

## 2022-03-16 PROCEDURE — 93018 EXERCISE STRESS - EKG (CUPID ONLY): ICD-10-PCS | Mod: ,,, | Performed by: INTERNAL MEDICINE

## 2022-03-16 RX ORDER — FLUTICASONE PROPIONATE 50 MCG
2 SPRAY, SUSPENSION (ML) NASAL DAILY
Qty: 16 G | Refills: 1 | OUTPATIENT
Start: 2022-03-16

## 2022-03-21 ENCOUNTER — TELEPHONE (OUTPATIENT)
Dept: CARDIOLOGY | Facility: CLINIC | Age: 52
End: 2022-03-21
Payer: MEDICAID

## 2022-03-21 NOTE — TELEPHONE ENCOUNTER
Pt contacted about results, pt verbalized understanding.          ----- Message from Jaswant Conroy MD sent at 3/20/2022 12:33 PM CDT -----  Echo showed normal function and valvular structures  Stress test negative  F/u as needed

## 2022-03-30 ENCOUNTER — PATIENT MESSAGE (OUTPATIENT)
Dept: INTERNAL MEDICINE | Facility: CLINIC | Age: 52
End: 2022-03-30
Payer: MEDICAID

## 2022-04-01 ENCOUNTER — PATIENT OUTREACH (OUTPATIENT)
Dept: ADMINISTRATIVE | Facility: OTHER | Age: 52
End: 2022-04-01
Payer: MEDICAID

## 2022-04-02 NOTE — PROGRESS NOTES
Health Maintenance Due   Topic Date Due    Shingles Vaccine (2 of 2) 08/12/2019    Foot Exam  04/19/2022     Updates were requested from care everywhere.  Chart was reviewed for overdue Proactive [facility] Encounters ([facility]) topics (CRS, Breast Cancer Screening, Eye exam)  Health Maintenance has been updated.  LINKS immunization registry triggered.  Immunizations were reconciled.

## 2022-04-05 ENCOUNTER — TELEPHONE (OUTPATIENT)
Dept: NEUROLOGY | Facility: CLINIC | Age: 52
End: 2022-04-05
Payer: MEDICAID

## 2022-04-06 NOTE — TELEPHONE ENCOUNTER
No new care gaps identified.  Powered by [employer]. Reference number: [id number].   4/06/2022 3:45:51 PM CDT

## 2022-04-12 RX ORDER — DULAGLUTIDE 3 MG/.5ML
3 INJECTION, SOLUTION SUBCUTANEOUS
Qty: 4 PEN | Refills: 11 | Status: SHIPPED | OUTPATIENT
Start: 2022-06-23 | End: 2022-06-10 | Stop reason: SDUPTHER

## 2022-04-12 RX ORDER — DULAGLUTIDE 1.5 MG/.5ML
1.5 INJECTION, SOLUTION SUBCUTANEOUS
Qty: 4 PEN | Refills: 0 | Status: SHIPPED | OUTPATIENT
Start: 2022-05-19 | End: 2022-04-29

## 2022-04-18 ENCOUNTER — TELEPHONE (OUTPATIENT)
Dept: SPORTS MEDICINE | Facility: CLINIC | Age: 52
End: 2022-04-18
Payer: MEDICAID

## 2022-04-18 DIAGNOSIS — M25.561 RIGHT KNEE PAIN, UNSPECIFIED CHRONICITY: Primary | ICD-10-CM

## 2022-04-18 NOTE — TELEPHONE ENCOUNTER
Spoke with patient regarding upcoming appt. Patient accepted and verbalized understanding.    Xerosis Aggressive Treatment: I recommended application of Cetaphil or CeraVe numerous times a day going to bed to all dry areas. I also prescribed a topical steroid for twice daily use.

## 2022-04-21 ENCOUNTER — PATIENT MESSAGE (OUTPATIENT)
Dept: INTERNAL MEDICINE | Facility: CLINIC | Age: 52
End: 2022-04-21
Payer: MEDICAID

## 2022-04-21 ENCOUNTER — TELEPHONE (OUTPATIENT)
Dept: INTERNAL MEDICINE | Facility: CLINIC | Age: 52
End: 2022-04-21
Payer: MEDICAID

## 2022-04-28 ENCOUNTER — OFFICE VISIT (OUTPATIENT)
Dept: SPORTS MEDICINE | Facility: CLINIC | Age: 52
End: 2022-04-28
Payer: MEDICAID

## 2022-04-28 ENCOUNTER — HOSPITAL ENCOUNTER (OUTPATIENT)
Dept: RADIOLOGY | Facility: HOSPITAL | Age: 52
Discharge: HOME | End: 2022-04-28
Attending: FAMILY MEDICINE
Payer: MEDICAID

## 2022-04-28 VITALS — BODY MASS INDEX: 31.3 KG/M2 | WEIGHT: 231.06 LBS | HEIGHT: 72 IN

## 2022-04-28 DIAGNOSIS — M25.559 HIP PAIN: ICD-10-CM

## 2022-04-28 DIAGNOSIS — E11.9 DIABETES MELLITUS WITH COINCIDENT HYPERTENSION: ICD-10-CM

## 2022-04-28 DIAGNOSIS — F32.A ANXIETY AND DEPRESSION: ICD-10-CM

## 2022-04-28 DIAGNOSIS — M25.561 RIGHT KNEE PAIN, UNSPECIFIED CHRONICITY: ICD-10-CM

## 2022-04-28 DIAGNOSIS — F41.9 ANXIETY AND DEPRESSION: ICD-10-CM

## 2022-04-28 DIAGNOSIS — M25.561 ACUTE PAIN OF RIGHT KNEE: ICD-10-CM

## 2022-04-28 DIAGNOSIS — M17.32 POST-TRAUMATIC OSTEOARTHRITIS OF LEFT KNEE: Primary | ICD-10-CM

## 2022-04-28 DIAGNOSIS — M25.559 HIP PAIN: Primary | ICD-10-CM

## 2022-04-28 DIAGNOSIS — I10 DIABETES MELLITUS WITH COINCIDENT HYPERTENSION: ICD-10-CM

## 2022-04-28 PROCEDURE — 3066F PR DOCUMENTATION OF TREATMENT FOR NEPHROPATHY: ICD-10-PCS | Mod: CPTII,,, | Performed by: FAMILY MEDICINE

## 2022-04-28 PROCEDURE — 3046F PR MOST RECENT HEMOGLOBIN A1C LEVEL > 9.0%: ICD-10-PCS | Mod: CPTII,,, | Performed by: FAMILY MEDICINE

## 2022-04-28 PROCEDURE — 99213 OFFICE O/P EST LOW 20 MIN: CPT | Mod: PBBFAC | Performed by: FAMILY MEDICINE

## 2022-04-28 PROCEDURE — 73521 X-RAY EXAM HIPS BI 2 VIEWS: CPT | Mod: TC

## 2022-04-28 PROCEDURE — 73564 X-RAY EXAM KNEE 4 OR MORE: CPT | Mod: 26,LT,, | Performed by: RADIOLOGY

## 2022-04-28 PROCEDURE — 3066F NEPHROPATHY DOC TX: CPT | Mod: CPTII,,, | Performed by: FAMILY MEDICINE

## 2022-04-28 PROCEDURE — 4010F PR ACE/ARB THEARPY RXD/TAKEN: ICD-10-PCS | Mod: CPTII,,, | Performed by: FAMILY MEDICINE

## 2022-04-28 PROCEDURE — 73564 XR KNEE ORTHO BILAT WITH FLEXION: ICD-10-PCS | Mod: 26,RT,, | Performed by: RADIOLOGY

## 2022-04-28 PROCEDURE — 3061F PR NEG MICROALBUMINURIA RESULT DOCUMENTED/REVIEW: ICD-10-PCS | Mod: CPTII,,, | Performed by: FAMILY MEDICINE

## 2022-04-28 PROCEDURE — 3008F PR BODY MASS INDEX (BMI) DOCUMENTED: ICD-10-PCS | Mod: CPTII,,, | Performed by: FAMILY MEDICINE

## 2022-04-28 PROCEDURE — 3046F HEMOGLOBIN A1C LEVEL >9.0%: CPT | Mod: CPTII,,, | Performed by: FAMILY MEDICINE

## 2022-04-28 PROCEDURE — 73564 X-RAY EXAM KNEE 4 OR MORE: CPT | Mod: 26,RT,, | Performed by: RADIOLOGY

## 2022-04-28 PROCEDURE — 73521 X-RAY EXAM HIPS BI 2 VIEWS: CPT | Mod: 26,,, | Performed by: RADIOLOGY

## 2022-04-28 PROCEDURE — 3008F BODY MASS INDEX DOCD: CPT | Mod: CPTII,,, | Performed by: FAMILY MEDICINE

## 2022-04-28 PROCEDURE — 3072F LOW RISK FOR RETINOPATHY: CPT | Mod: CPTII,,, | Performed by: FAMILY MEDICINE

## 2022-04-28 PROCEDURE — 4010F ACE/ARB THERAPY RXD/TAKEN: CPT | Mod: CPTII,,, | Performed by: FAMILY MEDICINE

## 2022-04-28 PROCEDURE — 73564 X-RAY EXAM KNEE 4 OR MORE: CPT | Mod: TC,50

## 2022-04-28 PROCEDURE — 99215 OFFICE O/P EST HI 40 MIN: CPT | Mod: S$PBB,,, | Performed by: FAMILY MEDICINE

## 2022-04-28 PROCEDURE — 73521 XR HIPS BILATERAL 2 VIEW INCL AP PELVIS: ICD-10-PCS | Mod: 26,,, | Performed by: RADIOLOGY

## 2022-04-28 PROCEDURE — 99215 PR OFFICE/OUTPT VISIT, EST, LEVL V, 40-54 MIN: ICD-10-PCS | Mod: S$PBB,,, | Performed by: FAMILY MEDICINE

## 2022-04-28 PROCEDURE — 3072F PR LOW RISK FOR RETINOPATHY: ICD-10-PCS | Mod: CPTII,,, | Performed by: FAMILY MEDICINE

## 2022-04-28 PROCEDURE — 99999 PR PBB SHADOW E&M-EST. PATIENT-LVL III: CPT | Mod: PBBFAC,,, | Performed by: FAMILY MEDICINE

## 2022-04-28 PROCEDURE — 3061F NEG MICROALBUMINURIA REV: CPT | Mod: CPTII,,, | Performed by: FAMILY MEDICINE

## 2022-04-28 PROCEDURE — 99999 PR PBB SHADOW E&M-EST. PATIENT-LVL III: ICD-10-PCS | Mod: PBBFAC,,, | Performed by: FAMILY MEDICINE

## 2022-04-28 RX ORDER — LISINOPRIL 20 MG/1
20 TABLET ORAL DAILY
COMMUNITY
Start: 2022-03-24 | End: 2022-05-04 | Stop reason: SDUPTHER

## 2022-04-28 NOTE — PATIENT INSTRUCTIONS
Start physical therapy at [place]    Meloxicam 1 per day    Muscle relaxer per day when needed    Voltaren gel over-the-counter 3 times a day    Update hip x-rays today    Recheck here 6 weeks    Work with primary care and diabetic management team on improving blood sugar and try to check fasting blood sugar at least once per week.

## 2022-04-29 DIAGNOSIS — M17.32 POST-TRAUMATIC OSTEOARTHRITIS OF LEFT KNEE: Primary | ICD-10-CM

## 2022-05-02 ENCOUNTER — PATIENT MESSAGE (OUTPATIENT)
Dept: INTERNAL MEDICINE | Facility: CLINIC | Age: 52
End: 2022-05-02
Payer: MEDICAID

## 2022-05-04 ENCOUNTER — PATIENT MESSAGE (OUTPATIENT)
Dept: INTERNAL MEDICINE | Facility: CLINIC | Age: 52
End: 2022-05-04
Payer: MEDICAID

## 2022-05-04 DIAGNOSIS — E11.9 DIABETES MELLITUS WITHOUT COMPLICATION: ICD-10-CM

## 2022-05-04 RX ORDER — LISINOPRIL 20 MG/1
20 TABLET ORAL DAILY
Qty: 90 TABLET | Refills: 4 | Status: SHIPPED | OUTPATIENT
Start: 2022-05-04 | End: 2022-12-30

## 2022-05-04 RX ORDER — METFORMIN HYDROCHLORIDE 1000 MG/1
TABLET ORAL
Qty: 135 TABLET | Refills: 4 | Status: SHIPPED | OUTPATIENT
Start: 2022-05-04 | End: 2023-05-23 | Stop reason: SDUPTHER

## 2022-05-04 NOTE — TELEPHONE ENCOUNTER
No new care gaps identified.  [facility] Embedded Care Gaps. Reference number: [id number]. 5/04/2022   11:02:22 AM MELISSAT

## 2022-05-04 NOTE — TELEPHONE ENCOUNTER
Pt is requesting refills on metformin and lisinopril. States he is completely out of medication. Please advise.//ddw

## 2022-05-21 ENCOUNTER — PATIENT MESSAGE (OUTPATIENT)
Dept: INTERNAL MEDICINE | Facility: CLINIC | Age: 52
End: 2022-05-21
Payer: MEDICAID

## 2022-05-23 DIAGNOSIS — E11.9 DIABETES MELLITUS WITHOUT COMPLICATION: ICD-10-CM

## 2022-05-23 DIAGNOSIS — E78.2 ELEVATED TRIGLYCERIDES WITH HIGH CHOLESTEROL: ICD-10-CM

## 2022-05-23 NOTE — TELEPHONE ENCOUNTER
Care Due:                  Date            Visit Type   Department     Provider  --------------------------------------------------------------------------------                                EP -                              PRIMARY      [facility] INTERNAL  Last Visit: 02-      CARE ([facility])   MEDICINE       Juan Daniel Long  Next Visit: None Scheduled  None         None Found                                                            Last  Test          Frequency    Reason                     Performed    Due Date  --------------------------------------------------------------------------------    HBA1C.......  6 months...  dulaglutide, metFORMIN...  02-   08-    [facility] Embedded Care Gaps. Reference number: [id number]. 5/23/2022   2:41:14 PM CDT

## 2022-05-24 RX ORDER — ROSUVASTATIN CALCIUM 5 MG/1
5 TABLET, COATED ORAL DAILY
Qty: 90 TABLET | Refills: 4 | Status: SHIPPED | OUTPATIENT
Start: 2022-05-24 | End: 2023-05-19

## 2022-05-24 NOTE — TELEPHONE ENCOUNTER
Refill Routing Note   Medication(s) are not appropriate for processing by [facility] for the following reason(s):      - Medication not previously prescribed by PCP    [facility] action(s):  Defer          Medication reconciliation completed: No     Appointments  past 12m or future 3m with PCP    Date Provider   Last Visit   2/22/2022 Juan Daniel Long MD   Next Visit   Visit date not found Juan Daniel Long MD   ED visits in past 90 days: 0        Note composed:11:08 AM 05/24/2022

## 2022-05-25 ENCOUNTER — CLINICAL SUPPORT (OUTPATIENT)
Dept: REHABILITATION | Facility: HOSPITAL | Age: 52
End: 2022-05-25
Payer: MEDICAID

## 2022-05-25 DIAGNOSIS — M17.32 POST-TRAUMATIC OSTEOARTHRITIS OF LEFT KNEE: ICD-10-CM

## 2022-05-25 DIAGNOSIS — R29.898 WEAKNESS OF LEFT LOWER EXTREMITY: ICD-10-CM

## 2022-05-25 DIAGNOSIS — M25.561 ACUTE PAIN OF RIGHT KNEE: Primary | ICD-10-CM

## 2022-05-25 PROCEDURE — 97162 PT EVAL MOD COMPLEX 30 MIN: CPT

## 2022-05-25 NOTE — PLAN OF CARE
[facility]  Physical Therapy Initial Evaluation     Date: 5/25/2022   Name: Saúl Perez  Clinic Number: [id number]    Therapy Diagnosis:   Encounter Diagnoses   Name Primary?    Post-traumatic osteoarthritis of left knee     Acute pain of right knee Yes    Weakness of left lower extremity      Physician: Art Whitt MD    Physician Orders: PT Eval and Treat  Medical Diagnosis from Referral: M17.32 (ICD-10-CM) - Post-traumatic osteoarthritis of left knee  Evaluation Date: 5/25/2022  Authorization Period Expiration: 4/29/2023  Plan of Care Expiration: 7/25/2022  Progress Note Due: 6/25/2022  Visit # / Visits authorized: 1/1   FOTO: 1/3     Time In: 5:45pm  Time Out: 6:20pm  Total Appointment Time (timed & untimed codes): 35 minutes    Precautions: Standard    Surgery: Arthroscopy in 2012    SUBJECTIVE   Date of onset: on and off for years   History of current condition - Saúl reports: He first hurt his knee back in 1996. He states he has had little things happen ever since then. He states he has played basketball and ran most his life so he is sure that has damaged the knee over time. He states the right knee has really been bothering him more than the left. He states the knee pain in both knees is all day every day. He states every day activity is now bothering his knees.      Imaging, x-ray: Right knee: There is no radiographic evidence of acute osseous, articular, or soft tissue abnormality. Joint spaces are well preserved  Left knee:  There is no radiographic evidence of acute osseous, articular, or soft tissue abnormality. There is tricompartmental osteoarthritis with mild joint space loss in the medial compartment.    Prior Therapy: 2017  Social History: lives with their family  Occupation:    Prior Level of Function: walking, stairs  Current Level of Function: same but light and with pain     Pain:  Current 3/10, worst 9/10, best 3/10   Location: bilateral  knees   Description: Aching and Sharp  Aggravating Factors: Sitting, Standing, Bending, Walking, Extension, Lifting and Getting out of bed/chair  Easing Factors: rest    Pts goals: decrease pain     Medical History:   Past Medical History:   Diagnosis Date    Chicken pox     History of alcohol abuse     HTN (hypertension)     Type 2 diabetes mellitus        Surgical History:   Saúl Perez  has a past surgical history that includes Hernia repair; Knee surgery; lipomas removed; Esophagogastroduodenoscopy; Colonoscopy w/ polypectomy (07/01/2019); and Esophagogastroduodenoscopy (N/A, 10/1/2020).    Medications:   Saúl has a current medication list which includes the following prescription(s): aspirin, bd suhas 2nd gen pen needle, cyanocobalamin, [START ON 6/23/2022] trulicity, fluticasone propionate, folic acid, hydroxyzine pamoate, lancets, lisinopril, lisinopril, magnesium oxide, meloxicam, metformin, multivitamin, omega-3 acid ethyl esters, omeprazole, onetouch ultra2 meter, rosuvastatin, semglee pen u-100 insulin, sertraline, triamcinolone acetonide 0.1%, trulicity, and vitamin d.    Allergies:   Review of patient's allergies indicates:   Allergen Reactions    No known drug allergies         OBJECTIVE     Sensation:  Sensation is intact to light touch    (WFL: Within Functional Limits)     ROM   Right (degrees) Left (degrees)   Knee Flexion (140) 130 tension 130 tension   Knee Extension (0) 0 0     Joint Mobility   Right Left    Tibial Posterior Glide  Normal Normal   Tibial Anterior Glide  Normal Normal   Patellar Superior Glide  Normal Normal   Patellar Inferior Glide  Normal Normal   Patellar Medial Glide  Normal Normal   Patellar Lateral Glide  Normal Normal     Strength   Right    Left   Gluteus Devon 4/5 4/5   Gluteus Medius 4-/5 4-/5   Psoas 4/5 4+/5   Quadriceps 4/5 pain 4+/5   Hamstrings 4+/5 4+/5   Anterior Tibialis 5/5 5/5   Gastroc/Soleus 5/5 5/5   Transverse Abdominis 3+/5 3+/5     Muscle  Length: decreased hamstring length bilaterally         Palpation: tender at quadriceps tendon on both knees, tender at IT band on right    Movement Analysis:   Test Right Left   Squat Pain   Step Up Pain Pain   Single Leg Balance Unstable with pain Unstable with pain    Heel Raise Unstable  Unstable        Gait Analysis: The patient ambulated with the use of none and presents with the following gait abnormalities: normal    Function:     CMS Impairment/Limitation/Restriction for FOTO Knee Survey    Therapist reviewed FOTO scores for Saúl Perez on 5/25/2022.   FOTO documents entered into [employer] - see Media section.    Limitation Score: 53%     TREATMENT     Saúl received therapeutic exercises to develop strength, endurance, ROM, flexibility, posture and core stabilization for 0 minutes including:    PATIENT EDUCATION AND HOME EXERCISES     Education provided:   - bridges, long arc quads, and prone quad stretch     Written Home Exercises Provided: yes.  Exercises were reviewed and Saúl was able to demonstrate them prior to the end of the session.  Saúl demonstrated good  understanding of the education provided.     See EMR under Patient Instructions for exercises provided 5/25/2022.    ASSESSMENT   Saúl is a 52 y.o. male referred to outpatient Physical Therapy with a medical diagnosis of M17.32 (ICD-10-CM) - Post-traumatic osteoarthritis of left knee. The patient presents with impairments which include decreased ROM, decreased strength, decreased muscle length, impaired coordination, impaired balance, gait abnormalities and decreased overall function.  These impairments are limiting patient's ability to walk long distances, stand after extended sitting, go up and down stairs, and workout. Pt prognosis is Good due to personal factors and co-morbidities listed below. Pt will benefit from skilled outpatient Physical Therapy to address the deficits stated above and in the chart below, provide pt/family education,  and to maximize pt's level of independence.      Plan of care discussed with patient: Yes  Pt's spiritual, cultural and educational needs considered and patient is agreeable to the plan of care and goals as stated below:     Anticipated Barriers for therapy: chronic condition, work schedule     Medical Necessity is demonstrated by the following  History  Co-morbidities and personal factors that may impact the plan of care Co-morbidities:   advanced age, coping style/mechanism, diabetes, high BMI, HTN, level of undertstanding of current condition and poor medication/medical compliance    Personal Factors:   age  coping style     moderate   Examination  Body Structures and Functions, activity limitations and participation restrictions that may impact the plan of care Body Regions:   lower extremities    Body Systems:    ROM  strength  gross coordinated movement  balance  gait  transfers    Participation Restrictions:   Working out    Activity limitations:   Learning and applying knowledge  no deficits    General Tasks and Commands  no deficits    Communication  no deficits    Mobility  walking    Self care  no deficits    Domestic Life  doing house work (cleaning house, washing dishes, laundry)    Interactions/Relationships  no deficits    Life Areas  employment    Community and Social Life  community life  recreation and leisure         moderate   Clinical Presentation evolving clinical presentation with changing clinical characteristics moderate   Decision Making/ Complexity Score: moderate     Goals:  Short Term Goals: 4 weeks   1. Patient will improve single leg balance to 30 seconds.   3. Patient will be independent with HEP in order to further progress and return to maximal function.  4. Pain rating at Worst: 4/10 in order to progress towards increased independence with activity.    Long Term Goals: 8 weeks   1. Patient will improve quadriceps strength to 5/5 in order to go up and down stairs.  2. Patient will  improve glute med strength to 4+/5 in order to squat.  3. Patient will self report improvement to 41% limitation on the FOTO Knee Survey.     PLAN   Plan of care Certification: 5/25/2022 to 7/25/2022.    Outpatient Physical Therapy 2 times weekly for 8 weeks to include the following interventions: Gait Training, Manual Therapy, Moist Heat/ Ice, Neuromuscular Re-ed, Patient Education, Self Care, Therapeutic Activities and Therapeutic Exercise.     Shahab Pineda, PT, DPT     CERTIFY THE NEED FOR THESE SERVICES FURNISHED UNDER THIS PLAN OF TREATMENT AND WHILE UNDER MY CARE   Physician's comments:     Physician's Signature: ___________________________________________________

## 2022-06-03 ENCOUNTER — PATIENT MESSAGE (OUTPATIENT)
Dept: INTERNAL MEDICINE | Facility: CLINIC | Age: 52
End: 2022-06-03
Payer: MEDICAID

## 2022-06-08 ENCOUNTER — PATIENT MESSAGE (OUTPATIENT)
Dept: INTERNAL MEDICINE | Facility: CLINIC | Age: 52
End: 2022-06-08
Payer: MEDICAID

## 2022-06-10 RX ORDER — DULAGLUTIDE 3 MG/.5ML
3 INJECTION, SOLUTION SUBCUTANEOUS
Qty: 4 PEN | Refills: 11 | Status: SHIPPED | OUTPATIENT
Start: 2022-06-23 | End: 2022-12-15

## 2022-06-10 NOTE — TELEPHONE ENCOUNTER
Care Due:                  Date            Visit Type   Department     Provider  --------------------------------------------------------------------------------                                EP -                              PRIMARY      [facility] INTER[facility]  Last Visit: 02-      CARE ([facility])   MEDICINE       Juan Daniel Long  Next Visit: None Scheduled  None         None Found                                                            Last  Test          Frequency    Reason                     Performed    Due Date  --------------------------------------------------------------------------------    CMP.........  12 months..  lisinopriL, metFORMIN....  09- 08-    [facility] Embedded Care Gaps. Reference number: [id number]. 6/10/2022   7:39:38 AM CDT

## 2022-06-15 ENCOUNTER — DOCUMENTATION ONLY (OUTPATIENT)
Dept: REHABILITATION | Facility: HOSPITAL | Age: 52
End: 2022-06-15
Payer: MEDICAID

## 2022-06-15 DIAGNOSIS — M25.561 ACUTE PAIN OF RIGHT KNEE: ICD-10-CM

## 2022-06-15 DIAGNOSIS — R29.898 WEAKNESS OF LEFT LOWER EXTREMITY: Primary | ICD-10-CM

## 2022-06-15 NOTE — PROGRESS NOTES
[facility]  PT Discharge Note    Name: Saúl Perez  Clinic Number: [id number]    Therapy Diagnosis:   Encounter Diagnoses   Name Primary?    Weakness of left lower extremity Yes    Acute pain of right knee      Physician: No ref. provider found    Physician Orders: PT Eval and Treat  Medical Diagnosis from Referral: M17.32 (ICD-10-CM) - Post-traumatic osteoarthritis of left knee  Evaluation Date: 5/25/2022      Date of Last visit: 5/25/2022  Total Visits Received: 1    ASSESSMENT      Patient had personal issues come up and decided to hold off on therapy at this time.     Discharge reason: Patient has not attended therapy since 5/25/2022.    Discharge FOTO Score: N/A    Goals: NOT MET/NOT ASSESSED    PLAN   This patient is discharged from Physical Therapy      Shahab Pineda, PT, DPT

## 2022-06-20 ENCOUNTER — PATIENT MESSAGE (OUTPATIENT)
Dept: INTERNAL MEDICINE | Facility: CLINIC | Age: 52
End: 2022-06-20
Payer: MEDICAID

## 2022-06-21 ENCOUNTER — LAB VISIT (OUTPATIENT)
Dept: LAB | Facility: HOSPITAL | Age: 52
End: 2022-06-21
Attending: FAMILY MEDICINE
Payer: MEDICAID

## 2022-06-21 ENCOUNTER — IMMUNIZATION (OUTPATIENT)
Dept: PHARMACY | Facility: CLINIC | Age: 52
End: 2022-06-21
Payer: MEDICAID

## 2022-06-21 DIAGNOSIS — Z12.5 SCREENING FOR PROSTATE CANCER: ICD-10-CM

## 2022-06-21 DIAGNOSIS — Z23 NEED FOR VACCINATION: Primary | ICD-10-CM

## 2022-06-21 DIAGNOSIS — R97.20 ELEVATED PSA: ICD-10-CM

## 2022-06-21 LAB
COMPLEXED PSA SERPL-MCNC: 2.8 NG/ML (ref 0–4)
ESTIMATED AVG GLUCOSE: 214 MG/DL (ref 68–131)
HBA1C MFR BLD: 9.1 % (ref 4–5.6)

## 2022-06-21 PROCEDURE — 83036 HEMOGLOBIN GLYCOSYLATED A1C: CPT | Performed by: FAMILY MEDICINE

## 2022-06-21 PROCEDURE — 84153 ASSAY OF PSA TOTAL: CPT | Performed by: FAMILY MEDICINE

## 2022-06-21 PROCEDURE — 36415 COLL VENOUS BLD VENIPUNCTURE: CPT | Performed by: FAMILY MEDICINE

## 2022-06-23 ENCOUNTER — OFFICE VISIT (OUTPATIENT)
Dept: INTERNAL MEDICINE | Facility: CLINIC | Age: 52
End: 2022-06-23
Payer: MEDICAID

## 2022-06-23 ENCOUNTER — PATIENT MESSAGE (OUTPATIENT)
Dept: INTERNAL MEDICINE | Facility: CLINIC | Age: 52
End: 2022-06-23

## 2022-06-23 VITALS
OXYGEN SATURATION: 95 % | WEIGHT: 236.75 LBS | DIASTOLIC BLOOD PRESSURE: 88 MMHG | BODY MASS INDEX: 32.07 KG/M2 | HEIGHT: 72 IN | TEMPERATURE: 97 F | HEART RATE: 100 BPM | SYSTOLIC BLOOD PRESSURE: 118 MMHG

## 2022-06-23 DIAGNOSIS — E66.09 CLASS 1 OBESITY DUE TO EXCESS CALORIES WITH BODY MASS INDEX (BMI) OF 32.0 TO 32.9 IN ADULT, UNSPECIFIED WHETHER SERIOUS COMORBIDITY PRESENT: ICD-10-CM

## 2022-06-23 DIAGNOSIS — K21.9 GASTROESOPHAGEAL REFLUX DISEASE, UNSPECIFIED WHETHER ESOPHAGITIS PRESENT: ICD-10-CM

## 2022-06-23 DIAGNOSIS — R60.0 BILATERAL LOWER EXTREMITY EDEMA: ICD-10-CM

## 2022-06-23 PROCEDURE — 3066F NEPHROPATHY DOC TX: CPT | Mod: CPTII,,, | Performed by: PHYSICIAN ASSISTANT

## 2022-06-23 PROCEDURE — 1159F PR MEDICATION LIST DOCUMENTED IN MEDICAL RECORD: ICD-10-PCS | Mod: CPTII,,, | Performed by: PHYSICIAN ASSISTANT

## 2022-06-23 PROCEDURE — 3066F PR DOCUMENTATION OF TREATMENT FOR NEPHROPATHY: ICD-10-PCS | Mod: CPTII,,, | Performed by: PHYSICIAN ASSISTANT

## 2022-06-23 PROCEDURE — 3008F BODY MASS INDEX DOCD: CPT | Mod: CPTII,,, | Performed by: PHYSICIAN ASSISTANT

## 2022-06-23 PROCEDURE — 4010F PR ACE/ARB THEARPY RXD/TAKEN: ICD-10-PCS | Mod: CPTII,,, | Performed by: PHYSICIAN ASSISTANT

## 2022-06-23 PROCEDURE — 3046F HEMOGLOBIN A1C LEVEL >9.0%: CPT | Mod: CPTII,,, | Performed by: PHYSICIAN ASSISTANT

## 2022-06-23 PROCEDURE — 3079F DIAST BP 80-89 MM HG: CPT | Mod: CPTII,,, | Performed by: PHYSICIAN ASSISTANT

## 2022-06-23 PROCEDURE — 1159F MED LIST DOCD IN RCRD: CPT | Mod: CPTII,,, | Performed by: PHYSICIAN ASSISTANT

## 2022-06-23 PROCEDURE — 99999 PR PBB SHADOW E&M-EST. PATIENT-LVL V: CPT | Mod: PBBFAC,,, | Performed by: PHYSICIAN ASSISTANT

## 2022-06-23 PROCEDURE — 1160F PR REVIEW ALL MEDS BY PRESCRIBER/CLIN PHARMACIST DOCUMENTED: ICD-10-PCS | Mod: CPTII,,, | Performed by: PHYSICIAN ASSISTANT

## 2022-06-23 PROCEDURE — 3072F PR LOW RISK FOR RETINOPATHY: ICD-10-PCS | Mod: CPTII,,, | Performed by: PHYSICIAN ASSISTANT

## 2022-06-23 PROCEDURE — 1160F RVW MEDS BY RX/DR IN RCRD: CPT | Mod: CPTII,,, | Performed by: PHYSICIAN ASSISTANT

## 2022-06-23 PROCEDURE — 99213 OFFICE O/P EST LOW 20 MIN: CPT | Mod: S$PBB,,, | Performed by: PHYSICIAN ASSISTANT

## 2022-06-23 PROCEDURE — 99213 PR OFFICE/OUTPT VISIT, EST, LEVL III, 20-29 MIN: ICD-10-PCS | Mod: S$PBB,,, | Performed by: PHYSICIAN ASSISTANT

## 2022-06-23 PROCEDURE — 4010F ACE/ARB THERAPY RXD/TAKEN: CPT | Mod: CPTII,,, | Performed by: PHYSICIAN ASSISTANT

## 2022-06-23 PROCEDURE — 3074F PR MOST RECENT SYSTOLIC BLOOD PRESSURE < 130 MM HG: ICD-10-PCS | Mod: CPTII,,, | Performed by: PHYSICIAN ASSISTANT

## 2022-06-23 PROCEDURE — 3061F PR NEG MICROALBUMINURIA RESULT DOCUMENTED/REVIEW: ICD-10-PCS | Mod: CPTII,,, | Performed by: PHYSICIAN ASSISTANT

## 2022-06-23 PROCEDURE — 99999 PR PBB SHADOW E&M-EST. PATIENT-LVL V: ICD-10-PCS | Mod: PBBFAC,,, | Performed by: PHYSICIAN ASSISTANT

## 2022-06-23 PROCEDURE — 99215 OFFICE O/P EST HI 40 MIN: CPT | Mod: PBBFAC | Performed by: PHYSICIAN ASSISTANT

## 2022-06-23 PROCEDURE — 3046F PR MOST RECENT HEMOGLOBIN A1C LEVEL > 9.0%: ICD-10-PCS | Mod: CPTII,,, | Performed by: PHYSICIAN ASSISTANT

## 2022-06-23 PROCEDURE — 3079F PR MOST RECENT DIASTOLIC BLOOD PRESSURE 80-89 MM HG: ICD-10-PCS | Mod: CPTII,,, | Performed by: PHYSICIAN ASSISTANT

## 2022-06-23 PROCEDURE — 3074F SYST BP LT 130 MM HG: CPT | Mod: CPTII,,, | Performed by: PHYSICIAN ASSISTANT

## 2022-06-23 PROCEDURE — 3008F PR BODY MASS INDEX (BMI) DOCUMENTED: ICD-10-PCS | Mod: CPTII,,, | Performed by: PHYSICIAN ASSISTANT

## 2022-06-23 PROCEDURE — 3072F LOW RISK FOR RETINOPATHY: CPT | Mod: CPTII,,, | Performed by: PHYSICIAN ASSISTANT

## 2022-06-23 PROCEDURE — 3061F NEG MICROALBUMINURIA REV: CPT | Mod: CPTII,,, | Performed by: PHYSICIAN ASSISTANT

## 2022-06-23 RX ORDER — METHOCARBAMOL 500 MG/1
TABLET, FILM COATED ORAL
COMMUNITY
Start: 2022-05-01

## 2022-06-23 RX ORDER — OMEPRAZOLE 40 MG/1
40 CAPSULE, DELAYED RELEASE ORAL DAILY
Qty: 90 CAPSULE | Refills: 3 | Status: SHIPPED | OUTPATIENT
Start: 2022-06-23

## 2022-06-23 NOTE — PROGRESS NOTES
"Subjective:     Patient ID: Saúl Perez is a 52 y.o. male.    Chief Complaint: Pain of the Right Knee      HPI:  Has noticed increasing right knee pain particularly for the past 6 months going up and down stairs.  Also has pain when standing or rotating the knees while standing such as a stretching maneuver.    He has also noticed some weakness in the left knee.    History of diabetes with recent A1c 10.2.  Takes Mobic for pain which helped somewhat and pain is generally to date over 10.   Past Medical History:   Diagnosis Date    Chicken pox     History of alcohol abuse     HTN (hypertension)     Type 2 diabetes mellitus      Past Surgical History:   Procedure Laterality Date    COLONOSCOPY W/ POLYPECTOMY  07/01/2019        ESOPHAGOGASTRODUODENOSCOPY      ESOPHAGOGASTRODUODENOSCOPY N/A 10/1/2020    Procedure: ESOPHAGOGASTRODUODENOSCOPY (EGD);  Surgeon: Markell Kuhn MD;  Location: 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       Current Outpatient Medications:     BD NAS 2ND GEN PEN NEEDLE 32 gauge x 5/32" Ndle, USE ONCE DAILY AT BEDTIME, Disp: , Rfl:     cyanocobalamin 500 MCG tablet, Take 500 mcg by mouth once daily., Disp: , Rfl:     fluticasone propionate (FLONASE) 50 mcg/actuation nasal spray, 2 sprays (100 mcg total) by Each Nostril route Daily., Disp: 48 g, Rfl: 3    hydrOXYzine pamoate (VISTARIL) 25 MG Cap, Take 25 mg by mouth 4 (four) times daily as needed., Disp: , Rfl:     lancets Misc, To check BG 1 times daily, to " use with insurance preferred meter, Disp: 50 each, Rfl: 11    lisinopriL (PRINIVIL,ZESTRIL) 40 MG tablet, Take 1 tablet (40 mg total) by mouth once daily., Disp: 90 tablet, Rfl: 4    magnesium oxide (MAG-OX) 400 mg (241.3 mg magnesium) tablet, Take 1 tablet by mouth once daily., Disp: , Rfl:     meloxicam (MOBIC) 15 MG tablet, Take 1 tablet (15 mg total) by mouth daily as needed for Pain., Disp: 30 tablet, Rfl: 3    multivitamin (THERAGRAN) per tablet, Take 1 tablet by mouth once daily., Disp: , Rfl:     omega-3 acid ethyl esters (LOVAZA) 1 gram capsule, Take 2 capsules (2 g total) by mouth 2 (two) times daily., Disp: 360 capsule, Rfl: 4    ONETOUCH ULTRA2 METER Misc, 2 (two) times daily., Disp: , Rfl:     triamcinolone acetonide 0.1% (KENALOG) 0.1 % cream, AAA bid, Disp: 454 g, Rfl: 0    vitamin D (VITAMIN D3) 1000 units Tab, Take 1,000 Units by mouth once daily., Disp: , Rfl:     aspirin (ECOTRIN) 81 MG EC tablet, aspirin 81 mg tablet,delayed release, Disp: , Rfl:     COVID-19 vac, jesus,Pfizer,,PF, (PFIZER COVID-19 JESUS VACCN,PF,) 30 mcg/0.3 mL injection, Inject into the muscle., Disp: 0.3 mL, Rfl: 0    dulaglutide (TRULICITY) 3 mg/0.5 mL pen injector, Inject 3 mg (one pen) into the skin every 7 days., Disp: 4 pen, Rfl: 11    folic acid (FOLVITE) 1 MG tablet, Take 1,000 mcg by mouth once daily., Disp: , Rfl:     lisinopriL (PRINIVIL,ZESTRIL) 20 MG tablet, Take 1 tablet (20 mg total) by mouth once daily., Disp: 90 tablet, Rfl: 4    metFORMIN (GLUCOPHAGE) 1000 MG tablet, Tab 1 po in AM and tab 1/2 po in PM., Disp: 135 tablet, Rfl: 4    methocarbamoL (ROBAXIN) 500 MG Tab, , Disp: , Rfl:     omeprazole (PRILOSEC) 40 MG capsule, Take 1 capsule (40 mg total) by mouth once daily., Disp: 90 capsule, Rfl: 3    rosuvastatin (CRESTOR) 5 MG tablet, Take 1 tablet (5 mg total) by mouth once daily., Disp: 90 tablet, Rfl: 4  Review of patient's allergies indicates:   Allergen Reactions    No known drug allergies       Review of Systems   Constitutional: Negative for chills, fever and weight loss.   Respiratory: Negative for shortness of breath.    Cardiovascular: Negative for chest pain and palpitations.       Objective:   Body mass index is 31.33 kg/m².  There were no vitals filed for this visit.        Ortho/SPM Exam-alert and oriented well-nourished well-developed ambulatory with antalgic gait no  acute distress    Respiratory effort.  Normal    Right knee-no acute deformity    Trace edema no redness or abnormal heat    Range of motion 0-100 degrees    Minimal varus and valgus laxity and negative Lachman's    Strength 3 to 4/5 bilateral    Neurovascular intact    Mild tenderness to joint lines bilateral    Psychiatric good affect cognition    Plan physical therapy    Voltaren gel Tylenol a Mobic as needed apply ice p.r.n. no 45 minute spent with patient and in reviewing pertinent materials today regarding comorbidities evaluation and treatment of his musculoskeletal  Patient Instructions   Start physical therapy at [facility]    Meloxicam 1 per day    Muscle relaxer per day when needed    Voltaren gel over-the-counter 3 times a day    Update hip x-rays today    Recheck here 6 weeks    Work with primary care and diabetic management team on improving blood sugar and try to check fasting blood sugar at least once per week.        IMAGING: X-Ray Hips Bilateral 2 View Incl AP Pelvis  Narrative: EXAMINATION:  XR HIPS BILATERAL 2 VIEW INCL AP PELVIS    CLINICAL HISTORY:  Pain in unspecified hip    TECHNIQUE:  Four views of the bilateral hips were obtained.  AP view of the pelvis was included.    COMPARISON:  None.    FINDINGS:  There is no radiographic evidence of acute osseous, articular, or soft tissue abnormality.  Bilateral hip joint spaces are preserved.  Impression: No acute findings.    Electronically signed by: Jorge Luis Whitmore MD  Date:    04/28/2022  Time:    14:28  X-ray Knee Ortho Bilateral with Flexion  Narrative:  EXAMINATION:  XR KNEE ORTHO BILAT WITH FLEXION    CLINICAL HISTORY:  Pain in right knee    TECHNIQUE:  AP standing of both knees, PA flexion standing views of both knees, and Merchant views of both knees were performed.  Lateral views of both knees were also performed.    COMPARISON:  None    FINDINGS:  Right knee: There is no radiographic evidence of acute osseous, articular, or soft tissue abnormality. Joint spaces are well preserved    Left knee:  There is no radiographic evidence of acute osseous, articular, or soft tissue abnormality. There is tricompartmental osteoarthritis with mild joint space loss in the medial compartment.  Impression: As above.  No acute findings.    Electronically signed by: Jorge Luis Whitmore MD  Date:    04/28/2022  Time:    13:28       Radiographs / Imaging : Relevant imaging results reviewed by me and interpreted by me, discussed with the patient and / or family and-x-rays reviewed by me and agree with report      Assessment:     Encounter Diagnoses   Name Primary?    Acute pain of right knee     Post-traumatic osteoarthritis of left knee Yes    Diabetes mellitus with coincident hypertension     Anxiety and depression         Plan:   Post-traumatic osteoarthritis of left knee    Acute pain of right knee    Diabetes mellitus with coincident hypertension    Anxiety and depression        The patient verbalized good understanding of the medical issues discussed today and expressed appreciation for the care provided.  Patient was given the opportunity to ask questions and be an active participant in their medical care. Patient had no further questions or concerns at this time.     The patient was encouraged to participate in appropriate physical activity.      Art Whitt M.D.  [facility]        This note was dictated using voice recognition software and may have sound a like errors.     Patient's belongings returned

## 2022-06-23 NOTE — PROGRESS NOTES
"  Subjective:      Patient ID: Saúl Perez is a 52 y.o. male.    Chief Complaint: Follow-up    HPI   Here today for a follow up for his medical problems and diabetes.   Trulicity titrated up to 3. Doing well on current dose without any issues.   GERD stable on omeprazole, needs refill.   Lives at [facility], lonely but denies depression. No anhedonia. Stable off zoloft.     BP stable on lisinopril 40mg in AM and 20mg at night.   Diabetes labs will be reviewed today.     Reports lower extremity swelling, worse at the end of the day. Sits at a desk for work most of the day. Swelling goes down over night.   Patient Active Problem List   Diagnosis    Anxiety and depression    Encounter for screening for malignant neoplasm of prostate    Screening for malignant neoplasm of colon    Elevated triglycerides with high cholesterol    Moderate episode of recurrent major depressive disorder    Moderate hypertension    Encounter for long-term (current) use of medications    GERD without esophagitis    Uncontrolled diabetes mellitus with kidney complication, without long-term current use of insulin    History of alcohol abuse    Diabetes mellitus with coincident hypertension    Type 2 diabetes mellitus    Elevated troponin    Post-traumatic osteoarthritis of left knee       Current Outpatient Medications:     aspirin (ECOTRIN) 81 MG EC tablet, aspirin 81 mg tablet,delayed release, Disp: , Rfl:     BD NAS 2ND GEN PEN NEEDLE 32 gauge x 5/32" Ndle, USE ONCE DAILY AT BEDTIME, Disp: , Rfl:     COVID-19 vac, jesus,Pfizer,,PF, (PFIZER COVID-19 JESUS VACCN,PF,) 30 mcg/0.3 mL injection, Inject into the muscle., Disp: 0.3 mL, Rfl: 0    cyanocobalamin 500 MCG tablet, Take 500 mcg by mouth once daily., Disp: , Rfl:     dulaglutide (TRULICITY) 3 mg/0.5 mL pen injector, Inject 3 mg (one pen) into the skin every 7 days., Disp: 4 pen, Rfl: 11    fluticasone propionate (FLONASE) 50 mcg/actuation nasal spray, 2 sprays " (100 mcg total) by Each Nostril route Daily., Disp: 48 g, Rfl: 3    folic acid (FOLVITE) 1 MG tablet, Take 1,000 mcg by mouth once daily., Disp: , Rfl:     hydrOXYzine pamoate (VISTARIL) 25 MG Cap, Take 25 mg by mouth 4 (four) times daily as needed., Disp: , Rfl:     lancets Misc, To check BG 1 times daily, to use with insurance preferred meter, Disp: 50 each, Rfl: 11    lisinopriL (PRINIVIL,ZESTRIL) 20 MG tablet, Take 1 tablet (20 mg total) by mouth once daily., Disp: 90 tablet, Rfl: 4    lisinopriL (PRINIVIL,ZESTRIL) 40 MG tablet, Take 1 tablet (40 mg total) by mouth once daily., Disp: 90 tablet, Rfl: 4    magnesium oxide (MAG-OX) 400 mg (241.3 mg magnesium) tablet, Take 1 tablet by mouth once daily., Disp: , Rfl:     meloxicam (MOBIC) 15 MG tablet, Take 1 tablet (15 mg total) by mouth daily as needed for Pain., Disp: 30 tablet, Rfl: 3    metFORMIN (GLUCOPHAGE) 1000 MG tablet, Tab 1 po in AM and tab 1/2 po in PM., Disp: 135 tablet, Rfl: 4    methocarbamoL (ROBAXIN) 500 MG Tab, , Disp: , Rfl:     multivitamin (THERAGRAN) per tablet, Take 1 tablet by mouth once daily., Disp: , Rfl:     omega-3 acid ethyl esters (LOVAZA) 1 gram capsule, Take 2 capsules (2 g total) by mouth 2 (two) times daily., Disp: 360 capsule, Rfl: 4    ONETOUCH ULTRA2 METER Misc, 2 (two) times daily., Disp: , Rfl:     rosuvastatin (CRESTOR) 5 MG tablet, Take 1 tablet (5 mg total) by mouth once daily., Disp: 90 tablet, Rfl: 4    triamcinolone acetonide 0.1% (KENALOG) 0.1 % cream, AAA bid, Disp: 454 g, Rfl: 0    vitamin D (VITAMIN D3) 1000 units Tab, Take 1,000 Units by mouth once daily., Disp: , Rfl:     omeprazole (PRILOSEC) 40 MG capsule, Take 1 capsule (40 mg total) by mouth once daily., Disp: 90 capsule, Rfl: 3  Health Maintenance Due   Topic Date Due    Shingles Vaccine (2 of 2) 08/12/2019    Foot Exam  04/19/2022       Review of Systems   Constitutional: Negative for activity change, appetite change, chills, diaphoresis,  fatigue, fever and unexpected weight change.   HENT: Negative.  Negative for congestion, hearing loss, postnasal drip, rhinorrhea, sore throat, trouble swallowing and voice change.    Eyes: Negative.  Negative for visual disturbance.   Respiratory: Negative.  Negative for cough, choking, chest tightness and shortness of breath.    Cardiovascular: Negative for chest pain, palpitations and leg swelling.   Gastrointestinal: Negative for abdominal distention, abdominal pain, blood in stool, constipation, diarrhea, nausea and vomiting.   Endocrine: Negative for cold intolerance, heat intolerance, polydipsia and polyuria.   Genitourinary: Negative.  Negative for difficulty urinating and frequency.   Musculoskeletal: Negative for arthralgias, back pain, gait problem, joint swelling and myalgias.   Skin: Negative for color change, pallor, rash and wound.   Neurological: Negative for dizziness, tremors, weakness, light-headedness, numbness and headaches.   Hematological: Negative for adenopathy.   Psychiatric/Behavioral: Negative for behavioral problems, confusion, self-injury, sleep disturbance and suicidal ideas. The patient is not nervous/anxious.      Objective:   /88 (BP Location: Right arm, Patient Position: Sitting, BP Method: Medium (Manual))   Pulse 100   Temp 96.8 °F (36 °C) (Tympanic)   Ht 6' (1.829 m)   Wt 107.4 kg (236 lb 12.4 oz)   SpO2 95%   BMI 32.11 kg/m²     Physical Exam  Vitals reviewed.   Constitutional:       General: He is not in acute distress.     Appearance: Normal appearance. He is well-developed. He is not ill-appearing, toxic-appearing or diaphoretic.   HENT:      Head: Normocephalic and atraumatic.      Right Ear: External ear normal.      Left Ear: External ear normal.      Nose: Nose normal.   Eyes:      Conjunctiva/sclera: Conjunctivae normal.      Pupils: Pupils are equal, round, and reactive to light.   Cardiovascular:      Rate and Rhythm: Normal rate and regular rhythm.       Heart sounds: Normal heart sounds. No murmur heard.    No friction rub. No gallop.   Pulmonary:      Effort: Pulmonary effort is normal. No respiratory distress.      Breath sounds: Normal breath sounds. No wheezing or rales.   Chest:      Chest wall: No tenderness.   Abdominal:      General: There is no distension.      Palpations: Abdomen is soft.      Tenderness: There is no abdominal tenderness.   Musculoskeletal:         General: Normal range of motion.      Cervical back: Normal range of motion and neck supple.      Right lower leg: No tenderness.      Left lower leg: No tenderness.        Legs:    Lymphadenopathy:      Cervical: No cervical adenopathy.   Skin:     General: Skin is warm and dry.   Neurological:      Mental Status: He is alert and oriented to person, place, and time.   Psychiatric:         Behavior: Behavior normal.         Thought Content: Thought content normal.         Judgment: Judgment normal.         Assessment:     1. Uncontrolled diabetes mellitus with kidney complication, without long-term current use of insulin    2. Gastroesophageal reflux disease, unspecified whether esophagitis present    3. Class 1 obesity due to excess calories with body mass index (BMI) of 32.0 to 32.9 in adult, unspecified whether serious comorbidity present    4. Bilateral lower extremity edema      Plan:   Uncontrolled diabetes mellitus with kidney complication, without long-term current use of insulin  -     Ambulatory referral/consult to [facility]; Future; Expected date: 06/30/2022  -     Hemoglobin A1C; Future; Expected date: 09/23/2022  -A1C still not at goal. Agree to try lifestyle and wellness program. Recheck A1C in 3 months with follow up.     Gastroesophageal reflux disease, unspecified whether esophagitis present  -     omeprazole (PRILOSEC) 40 MG capsule; Take 1 capsule (40 mg total) by mouth once daily.  Dispense: 90 capsule; Refill: 3    Class 1 obesity due to excess calories  with body mass index (BMI) of 32.0 to 32.9 in adult, unspecified whether serious comorbidity present  -     Ambulatory referral/consult to [facility]; Future; Expected date: 06/30/2022    Bilateral lower extremity edema  -compression socks, elevate, exercise. Educational handout on this was sent to his patient portal.     Follow up in about 3 months (around 9/23/2022), or if symptoms worsen or fail to improve.

## 2022-07-11 ENCOUNTER — PATIENT MESSAGE (OUTPATIENT)
Dept: INTERNAL MEDICINE | Facility: CLINIC | Age: 52
End: 2022-07-11
Payer: MEDICAID

## 2022-08-29 ENCOUNTER — PATIENT MESSAGE (OUTPATIENT)
Dept: INTERNAL MEDICINE | Facility: CLINIC | Age: 52
End: 2022-08-29
Payer: MEDICAID

## 2022-08-29 DIAGNOSIS — F32.A DEPRESSION, UNSPECIFIED DEPRESSION TYPE: Primary | ICD-10-CM

## 2022-09-13 ENCOUNTER — PATIENT OUTREACH (OUTPATIENT)
Dept: ADMINISTRATIVE | Facility: HOSPITAL | Age: 52
End: 2022-09-13
Payer: MEDICAID

## 2022-09-13 NOTE — PROGRESS NOTES
BR eGFR REPORT:  No CMP/BMP found. Patient has upcoming appointment with PCP 10/20/22 and labs scheduled for 10/14/22.

## 2022-09-16 ENCOUNTER — TELEPHONE (OUTPATIENT)
Dept: INTERNAL MEDICINE | Facility: CLINIC | Age: 52
End: 2022-09-16
Payer: MEDICAID

## 2022-10-14 ENCOUNTER — LAB VISIT (OUTPATIENT)
Dept: LAB | Facility: HOSPITAL | Age: 52
End: 2022-10-14
Attending: PHYSICIAN ASSISTANT
Payer: MEDICAID

## 2022-10-14 LAB
ALBUMIN SERPL BCP-MCNC: 3.5 G/DL (ref 3.5–5.2)
ALP SERPL-CCNC: 129 U/L (ref 55–135)
ALT SERPL W/O P-5'-P-CCNC: 134 U/L (ref 10–44)
ANION GAP SERPL CALC-SCNC: 16 MMOL/L (ref 8–16)
AST SERPL-CCNC: 118 U/L (ref 10–40)
BILIRUB SERPL-MCNC: 0.8 MG/DL (ref 0.1–1)
BUN SERPL-MCNC: 12 MG/DL (ref 6–20)
CALCIUM SERPL-MCNC: 9.3 MG/DL (ref 8.7–10.5)
CHLORIDE SERPL-SCNC: 99 MMOL/L (ref 95–110)
CO2 SERPL-SCNC: 21 MMOL/L (ref 23–29)
CREAT SERPL-MCNC: 0.9 MG/DL (ref 0.5–1.4)
EST. GFR  (NO RACE VARIABLE): >60 ML/MIN/1.73 M^2
ESTIMATED AVG GLUCOSE: 263 MG/DL (ref 68–131)
GLUCOSE SERPL-MCNC: 302 MG/DL (ref 70–110)
HBA1C MFR BLD: 10.8 % (ref 4–5.6)
POTASSIUM SERPL-SCNC: 4.4 MMOL/L (ref 3.5–5.1)
PROT SERPL-MCNC: 7.9 G/DL (ref 6–8.4)
SODIUM SERPL-SCNC: 136 MMOL/L (ref 136–145)

## 2022-10-14 PROCEDURE — 83036 HEMOGLOBIN GLYCOSYLATED A1C: CPT | Performed by: PHYSICIAN ASSISTANT

## 2022-10-14 PROCEDURE — 80053 COMPREHEN METABOLIC PANEL: CPT | Performed by: FAMILY MEDICINE

## 2022-10-14 PROCEDURE — 36415 COLL VENOUS BLD VENIPUNCTURE: CPT | Performed by: FAMILY MEDICINE

## 2022-10-18 ENCOUNTER — PATIENT MESSAGE (OUTPATIENT)
Dept: ADMINISTRATIVE | Facility: HOSPITAL | Age: 52
End: 2022-10-18
Payer: MEDICAID

## 2022-10-19 ENCOUNTER — TELEPHONE (OUTPATIENT)
Dept: INTERNAL MEDICINE | Facility: CLINIC | Age: 52
End: 2022-10-19
Payer: MEDICAID

## 2022-10-19 NOTE — TELEPHONE ENCOUNTER
----- Message from Lynn Bowen sent at 10/19/2022  2:52 PM CDT -----  Contact: Súal  .Type:  Sooner Apoointment Request    Caller is requesting a sooner appointment.  Caller declined first available appointment listed below.  Caller will not accept being placed on the waitlist and is requesting a message be sent to doctor.  Name of Caller:Saúl  When is the first available appointment?unknown/reschedule 10/20/2022  Symptoms:3m follow up; digital medicine eligable  Would the patient rather a call back or a response via [facility]? call  Best Call Back Number:.[phone]  Additional Information:   Thanks

## 2022-10-20 ENCOUNTER — TELEPHONE (OUTPATIENT)
Dept: INTERNAL MEDICINE | Facility: CLINIC | Age: 52
End: 2022-10-20
Payer: MEDICAID

## 2022-10-20 NOTE — TELEPHONE ENCOUNTER
----- Message from Teddy Barba sent at 10/20/2022  8:47 AM CDT -----  Contact: Pt [phone]  No blue slot available to schedule an appointment for the patient.  Patient is established with which PCP: Juan Daniel Long   Reason for the visit: Urgent F/u discuss lab results  Would the patient like a call back, or a response through their [facility] portal?:  call    Pt would like to see only Dr Long

## 2022-10-24 ENCOUNTER — PATIENT MESSAGE (OUTPATIENT)
Dept: INTERNAL MEDICINE | Facility: CLINIC | Age: 52
End: 2022-10-24
Payer: MEDICAID

## 2022-10-28 ENCOUNTER — OFFICE VISIT (OUTPATIENT)
Dept: URGENT CARE | Facility: CLINIC | Age: 52
End: 2022-10-28
Payer: MEDICAID

## 2022-10-28 VITALS
HEIGHT: 72 IN | OXYGEN SATURATION: 99 % | WEIGHT: 236 LBS | TEMPERATURE: 99 F | SYSTOLIC BLOOD PRESSURE: 152 MMHG | HEART RATE: 94 BPM | RESPIRATION RATE: 18 BRPM | BODY MASS INDEX: 31.97 KG/M2 | DIASTOLIC BLOOD PRESSURE: 70 MMHG

## 2022-10-28 DIAGNOSIS — J30.9 ALLERGIC RHINITIS, UNSPECIFIED SEASONALITY, UNSPECIFIED TRIGGER: Primary | ICD-10-CM

## 2022-10-28 DIAGNOSIS — M77.12 LATERAL EPICONDYLITIS OF LEFT ELBOW: ICD-10-CM

## 2022-10-28 PROBLEM — R45.89 SUICIDAL BEHAVIOR: Status: ACTIVE | Noted: 2021-11-03

## 2022-10-28 PROBLEM — I10 MALIGNANT ESSENTIAL HYPERTENSION: Status: ACTIVE | Noted: 2022-10-28

## 2022-10-28 PROBLEM — F10.10 ALCOHOL ABUSE: Status: ACTIVE | Noted: 2022-10-28

## 2022-10-28 PROCEDURE — 3066F PR DOCUMENTATION OF TREATMENT FOR NEPHROPATHY: ICD-10-PCS | Mod: CPTII,S$GLB,, | Performed by: PHYSICIAN ASSISTANT

## 2022-10-28 PROCEDURE — 99213 PR OFFICE/OUTPT VISIT, EST, LEVL III, 20-29 MIN: ICD-10-PCS | Mod: S$GLB,,, | Performed by: PHYSICIAN ASSISTANT

## 2022-10-28 PROCEDURE — 3046F PR MOST RECENT HEMOGLOBIN A1C LEVEL > 9.0%: ICD-10-PCS | Mod: CPTII,S$GLB,, | Performed by: PHYSICIAN ASSISTANT

## 2022-10-28 PROCEDURE — 1159F MED LIST DOCD IN RCRD: CPT | Mod: CPTII,S$GLB,, | Performed by: PHYSICIAN ASSISTANT

## 2022-10-28 PROCEDURE — 3077F SYST BP >= 140 MM HG: CPT | Mod: CPTII,S$GLB,, | Performed by: PHYSICIAN ASSISTANT

## 2022-10-28 PROCEDURE — 3046F HEMOGLOBIN A1C LEVEL >9.0%: CPT | Mod: CPTII,S$GLB,, | Performed by: PHYSICIAN ASSISTANT

## 2022-10-28 PROCEDURE — 3078F DIAST BP <80 MM HG: CPT | Mod: CPTII,S$GLB,, | Performed by: PHYSICIAN ASSISTANT

## 2022-10-28 PROCEDURE — 1160F PR REVIEW ALL MEDS BY PRESCRIBER/CLIN PHARMACIST DOCUMENTED: ICD-10-PCS | Mod: CPTII,S$GLB,, | Performed by: PHYSICIAN ASSISTANT

## 2022-10-28 PROCEDURE — 3066F NEPHROPATHY DOC TX: CPT | Mod: CPTII,S$GLB,, | Performed by: PHYSICIAN ASSISTANT

## 2022-10-28 PROCEDURE — 3061F PR NEG MICROALBUMINURIA RESULT DOCUMENTED/REVIEW: ICD-10-PCS | Mod: CPTII,S$GLB,, | Performed by: PHYSICIAN ASSISTANT

## 2022-10-28 PROCEDURE — 1159F PR MEDICATION LIST DOCUMENTED IN MEDICAL RECORD: ICD-10-PCS | Mod: CPTII,S$GLB,, | Performed by: PHYSICIAN ASSISTANT

## 2022-10-28 PROCEDURE — 3061F NEG MICROALBUMINURIA REV: CPT | Mod: CPTII,S$GLB,, | Performed by: PHYSICIAN ASSISTANT

## 2022-10-28 PROCEDURE — 99213 OFFICE O/P EST LOW 20 MIN: CPT | Mod: S$GLB,,, | Performed by: PHYSICIAN ASSISTANT

## 2022-10-28 PROCEDURE — 3072F LOW RISK FOR RETINOPATHY: CPT | Mod: CPTII,S$GLB,, | Performed by: PHYSICIAN ASSISTANT

## 2022-10-28 PROCEDURE — 3078F PR MOST RECENT DIASTOLIC BLOOD PRESSURE < 80 MM HG: ICD-10-PCS | Mod: CPTII,S$GLB,, | Performed by: PHYSICIAN ASSISTANT

## 2022-10-28 PROCEDURE — 1160F RVW MEDS BY RX/DR IN RCRD: CPT | Mod: CPTII,S$GLB,, | Performed by: PHYSICIAN ASSISTANT

## 2022-10-28 PROCEDURE — 4010F PR ACE/ARB THEARPY RXD/TAKEN: ICD-10-PCS | Mod: CPTII,S$GLB,, | Performed by: PHYSICIAN ASSISTANT

## 2022-10-28 PROCEDURE — 4010F ACE/ARB THERAPY RXD/TAKEN: CPT | Mod: CPTII,S$GLB,, | Performed by: PHYSICIAN ASSISTANT

## 2022-10-28 PROCEDURE — 3077F PR MOST RECENT SYSTOLIC BLOOD PRESSURE >= 140 MM HG: ICD-10-PCS | Mod: CPTII,S$GLB,, | Performed by: PHYSICIAN ASSISTANT

## 2022-10-28 PROCEDURE — 3072F PR LOW RISK FOR RETINOPATHY: ICD-10-PCS | Mod: CPTII,S$GLB,, | Performed by: PHYSICIAN ASSISTANT

## 2022-10-28 RX ORDER — LEVOCETIRIZINE DIHYDROCHLORIDE 5 MG/1
5 TABLET, FILM COATED ORAL NIGHTLY
Qty: 30 TABLET | Refills: 11 | Status: SHIPPED | OUTPATIENT
Start: 2022-10-28 | End: 2023-10-28

## 2022-10-28 RX ORDER — IPRATROPIUM BROMIDE 21 UG/1
2 SPRAY, METERED NASAL 2 TIMES DAILY
Qty: 30 ML | Refills: 0 | Status: SHIPPED | OUTPATIENT
Start: 2022-10-28

## 2022-10-28 RX ORDER — PREDNISONE 10 MG/1
10 TABLET ORAL 2 TIMES DAILY
Qty: 10 TABLET | Refills: 0 | Status: SHIPPED | OUTPATIENT
Start: 2022-10-28 | End: 2022-11-02

## 2022-10-28 NOTE — PROGRESS NOTES
Subjective:       Patient ID: Saúl Perez is a 52 y.o. male.    Vitals:  height is 6' (1.829 m) and weight is 107 kg (236 lb). His tympanic temperature is 98.6 °F (37 °C). His blood pressure is 152/70 (abnormal) and his pulse is 94. His respiration is 18 and oxygen saturation is 99%.     Chief Complaint: Sinus Problem    Patient is a 52 year old male who presents today with cough, sinus drip and sneezing attacks for the last 6 weeks.  Patient states he has been taking Claritin without relief.  Patient states he also uses Flonase intermittently without relief.  Patient denies any chest pain or shortness of breath.  Patient states cough is nonproductive.  Patient states he has not had any fevers or chills throughout this time.  Patient has had no known sick contacts.      Patient also has Left elbow pain for the last 6 weeks.  Patient denies any known injury or palliative movement of the left elbow.  Patient has tried meloxicam without relief.  Patient denies any other symptoms including swelling or overlying skin changes.  Patient has not had any numbness tingling associated.  Patient states pain is worse with movement.  Patient denies any abnormal range of motion or weakness.    Sinus Problem  This is a new problem. The current episode started more than 1 month ago. There has been no fever. His pain is at a severity of 0/10. Associated symptoms include chills, congestion, coughing, diaphoresis, a hoarse voice, shortness of breath, sneezing and a sore throat. Pertinent negatives include no ear pain, headaches, neck pain, sinus pressure or swollen glands. (Post nasal drip) Treatments tried: flonase, clariton. The treatment provided no relief.     Constitution: Positive for chills and sweating. Negative for fever.   HENT:  Positive for congestion, postnasal drip and sore throat. Negative for ear pain, sinus pain, sinus pressure and trouble swallowing.    Neck: Negative for neck pain and painful lymph nodes.    Cardiovascular:  Negative for chest pain.   Respiratory:  Positive for cough and shortness of breath. Negative for sputum production.    Gastrointestinal:  Negative for abdominal pain, nausea, vomiting and diarrhea.   Musculoskeletal:  Positive for pain and joint pain. Negative for joint swelling, abnormal ROM of joint, arthritis and muscle ache.   Allergic/Immunologic: Positive for sneezing.   Neurological:  Negative for headaches.   Hematologic/Lymphatic: Negative for swollen lymph nodes.     Objective:      Physical Exam   Constitutional: He is oriented to person, place, and time. He appears well-developed. He is cooperative.  Non-toxic appearance. He does not appear ill. No distress.   HENT:   Head: Normocephalic and atraumatic.   Ears:   Right Ear: Hearing, tympanic membrane, external ear and ear canal normal.   Left Ear: Hearing, tympanic membrane, external ear and ear canal normal.   Nose: Congestion present. No mucosal edema, rhinorrhea or nasal deformity. No epistaxis. Right sinus exhibits no maxillary sinus tenderness and no frontal sinus tenderness. Left sinus exhibits no maxillary sinus tenderness and no frontal sinus tenderness.   Mouth/Throat: Uvula is midline and mucous membranes are normal. No trismus in the jaw. Normal dentition. No uvula swelling. Posterior oropharyngeal erythema present. No oropharyngeal exudate or posterior oropharyngeal edema.   Eyes: Conjunctivae and lids are normal. No scleral icterus.   Neck: Trachea normal and phonation normal. Neck supple. No edema present. No erythema present. No neck rigidity present.   Cardiovascular: Normal rate, regular rhythm, normal heart sounds and normal pulses.   No murmur heard.  Pulmonary/Chest: Effort normal and breath sounds normal. No respiratory distress. He has no decreased breath sounds. He has no wheezes. He has no rhonchi.   Abdominal: Normal appearance.   Musculoskeletal: Normal range of motion.         General: No deformity. Normal  range of motion.      Right elbow: Normal.     Left elbow: He exhibits normal range of motion, no swelling, no effusion, no deformity and no laceration. Tenderness found. Lateral epicondyle tenderness noted. No radial head, no medial epicondyle and no olecranon process tenderness noted.        Arms:       Comments: Tenderness indicated air without overlying skin changes.  Range of motion intact. Strength 5/5.  Sensation intact.   Lymphadenopathy:     He has no cervical adenopathy.   Neurological: He is alert and oriented to person, place, and time. He exhibits normal muscle tone. Coordination normal.   Skin: Skin is warm, dry, intact, not diaphoretic and not pale.   Psychiatric: His speech is normal and behavior is normal. Judgment and thought content normal.   Nursing note and vitals reviewed.      Assessment:       1. Allergic rhinitis, unspecified seasonality, unspecified trigger    2. Lateral epicondylitis of left elbow          Plan:         Allergic rhinitis, unspecified seasonality, unspecified trigger  -     ipratropium (ATROVENT) 21 mcg (0.03 %) nasal spray; 2 sprays by Nasal route 2 (two) times daily.  Dispense: 30 mL; Refill: 0  -     levocetirizine (XYZAL) 5 MG tablet; Take 1 tablet (5 mg total) by mouth every evening.  Dispense: 30 tablet; Refill: 11  -     predniSONE (DELTASONE) 10 MG tablet; Take 1 tablet (10 mg total) by mouth 2 (two) times daily. for 5 days  Dispense: 10 tablet; Refill: 0    Lateral epicondylitis of left elbow  -     predniSONE (DELTASONE) 10 MG tablet; Take 1 tablet (10 mg total) by mouth 2 (two) times daily. for 5 days  Dispense: 10 tablet; Refill: 0       Discussed likely allergies and use of medications as prescribed.    discussed prednisone to help with inflammation at the lateral aspect of the left elbow.  Discussed this may be lateral epicondylitis also known as tennis elbow.  Discussed follow-up with PCP for continued care.  Return to clinic symptoms worsen, change or  persist.  Patient verbalized understanding and agrees with plan.    Melissa Abraham PA-C    Patient Instructions   Please return here or go to the Emergency Department for any concerns or worsening of condition.   If you were given wait & see antibiotics, please wait 3-5 days before taking them, and only take them if your symptoms have worsened or not improved. If you do begin taking the antibiotics, please take them to completion.   Use an antihistamine such as Claritin, Zyrtec or Allegra to dry you out.   If you do not have Hypertension or any history of palpitations, it is ok to take over the counter Sudafed or Mucinex D or Allegra-D or Claritin-D or Zyrtec-D.   Use mucinex (guaifenisin) to break up mucous up to 2400mg/day to loosen any mucous. The mucinex DM pill has a cough suppressant that can be used at night to stop the tickle at the back of your throat.  If you do have Hypertension or palpitations, it is safe to take Coricidin HBP for relief of sinus symptoms.   We recommend you take over the counter Flonase (Fluticasone) or another nasally inhaled steroid unless you are already taking one.   Nasal irrigation with a saline spray or Netti Pot like device per their directions is also recommended.   If not allergic, please take over the counter Tylenol (Acetaminophen) and/or Motrin (Ibuprofen) as directed for control of pain and/or fever.

## 2022-10-28 NOTE — PATIENT INSTRUCTIONS
Please return here or go to the Emergency Department for any concerns or worsening of condition.   If you were given wait & see antibiotics, please wait 3-5 days before taking them, and only take them if your symptoms have worsened or not improved. If you do begin taking the antibiotics, please take them to completion.   Use an antihistamine such as Claritin, Zyrtec or Allegra to dry you out.   If you do not have Hypertension or any history of palpitations, it is ok to take over the counter Sudafed or Mucinex D or Allegra-D or Claritin-D or Zyrtec-D.   Use mucinex (guaifenisin) to break up mucous up to 2400mg/day to loosen any mucous. The mucinex DM pill has a cough suppressant that can be used at night to stop the tickle at the back of your throat.  If you do have Hypertension or palpitations, it is safe to take Coricidin HBP for relief of sinus symptoms.   We recommend you take over the counter Flonase (Fluticasone) or another nasally inhaled steroid unless you are already taking one.   Nasal irrigation with a saline spray or Netti Pot like device per their directions is also recommended.   If not allergic, please take over the counter Tylenol (Acetaminophen) and/or Motrin (Ibuprofen) as directed for control of pain and/or fever.

## 2022-11-07 ENCOUNTER — PATIENT MESSAGE (OUTPATIENT)
Dept: INTERNAL MEDICINE | Facility: CLINIC | Age: 52
End: 2022-11-07
Payer: MEDICAID

## 2022-11-07 DIAGNOSIS — Z12.5 SCREENING FOR PROSTATE CANCER: Primary | ICD-10-CM

## 2022-11-09 ENCOUNTER — LAB VISIT (OUTPATIENT)
Dept: LAB | Facility: HOSPITAL | Age: 52
End: 2022-11-09
Attending: FAMILY MEDICINE
Payer: MEDICAID

## 2022-11-09 DIAGNOSIS — Z12.5 SCREENING FOR PROSTATE CANCER: ICD-10-CM

## 2022-11-09 LAB — COMPLEXED PSA SERPL-MCNC: 3 NG/ML (ref 0–4)

## 2022-11-09 PROCEDURE — 84153 ASSAY OF PSA TOTAL: CPT | Performed by: FAMILY MEDICINE

## 2022-11-09 PROCEDURE — 36415 COLL VENOUS BLD VENIPUNCTURE: CPT | Performed by: FAMILY MEDICINE

## 2022-11-12 ENCOUNTER — OFFICE VISIT (OUTPATIENT)
Dept: URGENT CARE | Facility: CLINIC | Age: 52
End: 2022-11-12
Payer: MEDICAID

## 2022-11-12 VITALS
TEMPERATURE: 99 F | OXYGEN SATURATION: 96 % | SYSTOLIC BLOOD PRESSURE: 127 MMHG | RESPIRATION RATE: 16 BRPM | HEART RATE: 111 BPM | BODY MASS INDEX: 31.97 KG/M2 | HEIGHT: 72 IN | DIASTOLIC BLOOD PRESSURE: 77 MMHG | WEIGHT: 236 LBS

## 2022-11-12 DIAGNOSIS — J06.9 VIRAL URI: Primary | ICD-10-CM

## 2022-11-12 DIAGNOSIS — J02.9 SORE THROAT: ICD-10-CM

## 2022-11-12 DIAGNOSIS — R50.9 FEVER, UNSPECIFIED FEVER CAUSE: ICD-10-CM

## 2022-11-12 LAB
CTP QC/QA: YES
CTP QC/QA: YES
POC MOLECULAR INFLUENZA A AGN: NEGATIVE
POC MOLECULAR INFLUENZA B AGN: NEGATIVE
SARS-COV-2 RDRP RESP QL NAA+PROBE: NEGATIVE

## 2022-11-12 PROCEDURE — 99214 OFFICE O/P EST MOD 30 MIN: CPT | Mod: S$GLB,,, | Performed by: EMERGENCY MEDICINE

## 2022-11-12 PROCEDURE — 4010F ACE/ARB THERAPY RXD/TAKEN: CPT | Mod: CPTII,S$GLB,, | Performed by: EMERGENCY MEDICINE

## 2022-11-12 PROCEDURE — 99214 PR OFFICE/OUTPT VISIT, EST, LEVL IV, 30-39 MIN: ICD-10-PCS | Mod: S$GLB,,, | Performed by: EMERGENCY MEDICINE

## 2022-11-12 PROCEDURE — 3046F PR MOST RECENT HEMOGLOBIN A1C LEVEL > 9.0%: ICD-10-PCS | Mod: CPTII,S$GLB,, | Performed by: EMERGENCY MEDICINE

## 2022-11-12 PROCEDURE — 3078F DIAST BP <80 MM HG: CPT | Mod: CPTII,S$GLB,, | Performed by: EMERGENCY MEDICINE

## 2022-11-12 PROCEDURE — 3066F NEPHROPATHY DOC TX: CPT | Mod: CPTII,S$GLB,, | Performed by: EMERGENCY MEDICINE

## 2022-11-12 PROCEDURE — 87635 SARS-COV-2 COVID-19 AMP PRB: CPT | Mod: QW,S$GLB,, | Performed by: EMERGENCY MEDICINE

## 2022-11-12 PROCEDURE — 87502 POCT INFLUENZA A/B MOLECULAR: ICD-10-PCS | Mod: QW,S$GLB,, | Performed by: EMERGENCY MEDICINE

## 2022-11-12 PROCEDURE — 87502 INFLUENZA DNA AMP PROBE: CPT | Mod: QW,S$GLB,, | Performed by: EMERGENCY MEDICINE

## 2022-11-12 PROCEDURE — 1160F RVW MEDS BY RX/DR IN RCRD: CPT | Mod: CPTII,S$GLB,, | Performed by: EMERGENCY MEDICINE

## 2022-11-12 PROCEDURE — 3008F BODY MASS INDEX DOCD: CPT | Mod: CPTII,S$GLB,, | Performed by: EMERGENCY MEDICINE

## 2022-11-12 PROCEDURE — 3061F PR NEG MICROALBUMINURIA RESULT DOCUMENTED/REVIEW: ICD-10-PCS | Mod: CPTII,S$GLB,, | Performed by: EMERGENCY MEDICINE

## 2022-11-12 PROCEDURE — 3072F PR LOW RISK FOR RETINOPATHY: ICD-10-PCS | Mod: CPTII,S$GLB,, | Performed by: EMERGENCY MEDICINE

## 2022-11-12 PROCEDURE — 3074F SYST BP LT 130 MM HG: CPT | Mod: CPTII,S$GLB,, | Performed by: EMERGENCY MEDICINE

## 2022-11-12 PROCEDURE — 3046F HEMOGLOBIN A1C LEVEL >9.0%: CPT | Mod: CPTII,S$GLB,, | Performed by: EMERGENCY MEDICINE

## 2022-11-12 PROCEDURE — 4010F PR ACE/ARB THEARPY RXD/TAKEN: ICD-10-PCS | Mod: CPTII,S$GLB,, | Performed by: EMERGENCY MEDICINE

## 2022-11-12 PROCEDURE — 1159F PR MEDICATION LIST DOCUMENTED IN MEDICAL RECORD: ICD-10-PCS | Mod: CPTII,S$GLB,, | Performed by: EMERGENCY MEDICINE

## 2022-11-12 PROCEDURE — 3066F PR DOCUMENTATION OF TREATMENT FOR NEPHROPATHY: ICD-10-PCS | Mod: CPTII,S$GLB,, | Performed by: EMERGENCY MEDICINE

## 2022-11-12 PROCEDURE — 3072F LOW RISK FOR RETINOPATHY: CPT | Mod: CPTII,S$GLB,, | Performed by: EMERGENCY MEDICINE

## 2022-11-12 PROCEDURE — 87635: ICD-10-PCS | Mod: QW,S$GLB,, | Performed by: EMERGENCY MEDICINE

## 2022-11-12 PROCEDURE — 3061F NEG MICROALBUMINURIA REV: CPT | Mod: CPTII,S$GLB,, | Performed by: EMERGENCY MEDICINE

## 2022-11-12 PROCEDURE — 3078F PR MOST RECENT DIASTOLIC BLOOD PRESSURE < 80 MM HG: ICD-10-PCS | Mod: CPTII,S$GLB,, | Performed by: EMERGENCY MEDICINE

## 2022-11-12 PROCEDURE — 1159F MED LIST DOCD IN RCRD: CPT | Mod: CPTII,S$GLB,, | Performed by: EMERGENCY MEDICINE

## 2022-11-12 PROCEDURE — 1160F PR REVIEW ALL MEDS BY PRESCRIBER/CLIN PHARMACIST DOCUMENTED: ICD-10-PCS | Mod: CPTII,S$GLB,, | Performed by: EMERGENCY MEDICINE

## 2022-11-12 PROCEDURE — 3074F PR MOST RECENT SYSTOLIC BLOOD PRESSURE < 130 MM HG: ICD-10-PCS | Mod: CPTII,S$GLB,, | Performed by: EMERGENCY MEDICINE

## 2022-11-12 PROCEDURE — 3008F PR BODY MASS INDEX (BMI) DOCUMENTED: ICD-10-PCS | Mod: CPTII,S$GLB,, | Performed by: EMERGENCY MEDICINE

## 2022-11-12 RX ORDER — PROMETHAZINE HYDROCHLORIDE AND DEXTROMETHORPHAN HYDROBROMIDE 6.25; 15 MG/5ML; MG/5ML
5 SYRUP ORAL EVERY 4 HOURS PRN
Qty: 180 ML | Refills: 0 | Status: SHIPPED | OUTPATIENT
Start: 2022-11-12 | End: 2022-11-22

## 2022-11-12 NOTE — PROGRESS NOTES
Subjective:       Patient ID: Saúl Perez is a 52 y.o. male.    Vitals:  height is 6' (1.829 m) and weight is 107 kg (236 lb). His tympanic temperature is 99.2 °F (37.3 °C). His blood pressure is 127/77 and his pulse is 111 (abnormal). His respiration is 16 and oxygen saturation is 96%.     Chief Complaint: Cough (Sore throat, nausea)    Pt presents to the clinic today with a dry cough, sore throat, and nausea that began Thursday evening.  States he had a date the night before and his date was a little sick.  Has recently had an increase in his allergic rhinitis.  So now his nasal congestion is worse.  Using Atrovent nasal spray and Xyzal.  Patient did take oral steroids 2 weeks ago.  The cough is the main complaint and is keeping him up at night.    Cough  This is a new problem. The current episode started in the past 7 days. The problem has been gradually worsening. The problem occurs hourly. The cough is Non-productive. Associated symptoms include nasal congestion, postnasal drip and a sore throat. Pertinent negatives include no chills, fever, headaches or sweats. The symptoms are aggravated by lying down. Treatments tried: Zyrtec, nasal spray. The treatment provided mild relief.     Constitution: Negative for chills and fever.   HENT:  Positive for postnasal drip and sore throat.    Respiratory:  Positive for cough.    Neurological:  Negative for headaches.     Objective:      Physical Exam   Constitutional: He is oriented to person, place, and time. He appears well-developed. He is cooperative.  Non-toxic appearance. He does not appear ill. No distress.   HENT:   Head: Normocephalic and atraumatic.   Ears:   Right Ear: Hearing and external ear normal.   Left Ear: Hearing and external ear normal.   Nose: Congestion present. No mucosal edema, rhinorrhea or nasal deformity. No epistaxis. Right sinus exhibits no maxillary sinus tenderness and no frontal sinus tenderness. Left sinus exhibits no maxillary sinus  tenderness and no frontal sinus tenderness.   Mouth/Throat: Uvula is midline and mucous membranes are normal. No trismus in the jaw. Normal dentition. No uvula swelling. Posterior oropharyngeal erythema present. No oropharyngeal exudate or posterior oropharyngeal edema.   Eyes: Conjunctivae and lids are normal. No scleral icterus.   Neck: Trachea normal and phonation normal. Neck supple. No edema present. No erythema present. No neck rigidity present.   Cardiovascular: Normal rate, regular rhythm, normal heart sounds and normal pulses.   Pulmonary/Chest: Effort normal and breath sounds normal. No respiratory distress. He has no decreased breath sounds. He has no rhonchi.   Abdominal: Normal appearance.   Musculoskeletal: Normal range of motion.         General: No deformity. Normal range of motion.   Neurological: He is alert and oriented to person, place, and time. He exhibits normal muscle tone. Coordination normal.   Skin: Skin is warm, dry, intact, not diaphoretic and not pale.   Psychiatric: His speech is normal and behavior is normal. Judgment and thought content normal.   Nursing note and vitals reviewed.      Assessment:       1. Viral URI    2. Fever, unspecified fever cause    3. Sore throat        Results for orders placed or performed in visit on 11/12/22   POCT Influenza A/B MOLECULAR   Result Value Ref Range    POC Molecular Influenza A Ag Negative Negative, Not Reported    POC Molecular Influenza B Ag Negative Negative, Not Reported     Acceptable Yes    POCT COVID-19 Rapid Screening   Result Value Ref Range    POC Rapid COVID Negative Negative     Acceptable Yes        Plan:       Discussed use of different medications for relief of symptoms.  Patient will start taking his Flonase again until the nasal congestion feels better.  I have also counseled him to use saline drops in the nose for thinning of the sputum and ease of cough.  Sent a cough medicine for him as well.   He verbalizes understanding of and agreement with this plan    Viral URI  -     promethazine-dextromethorphan (PROMETHAZINE-DM) 6.25-15 mg/5 mL Syrp; Take 5 mLs by mouth every 4 (four) hours as needed (cough).  Dispense: 180 mL; Refill: 0    Fever, unspecified fever cause  -     POCT Influenza A/B MOLECULAR    Sore throat  -     POCT COVID-19 Rapid Screening

## 2022-11-12 NOTE — PATIENT INSTRUCTIONS
Use xyzal daily.  Flonase: 2 sprays per nostril 2 times a day.   Continue atrovent.  Nasal saline drops: 2-4 drops per nostril 10-15 times a day.     Tylenol or Motrin for fever or pain per label instructions.  Consider use of OTC cough medicine like Robitussin DM.  Warm saltwater gargles to 3 times a day.    Rest and drink plenty of fluids.  Avoid OTC decongestants if you have high blood pressure. This includes multi-cold symptom preparations.    Consider permissive fever to allow your immune system to work.  However, if fever is not tolerable or is disrupting sleep, use Tylenol or Motrin per label instructions.    You are considered contagious until your systemic, or whole body, symptoms have resolved fully for 24 hours.  This includes fever, body aches, fatigue.    Follow up in 2-3 days with PCP if no improvement or any worsening.       Viral Upper Respiratory Infection Discharge Instructions, Adult   About this topic   You have an upper respiratory infection or URI. A URI can affect your nose, throat, ears, and sinuses. A virus is the cause of almost all URIs and antibiotics will not help you feel better more quickly. The common cold is an example of a viral URI.  URIs are easy to spread from person to person, most often through coughing or sneezing. A URI will almost always get better in a week or two without any treatment.         What care is needed at home?   Ask your doctor what you need to do when you go home. Make sure you ask questions if you do not understand what the doctor says.  If you smoke, try to quit. Your doctor or nurse can help.  Drink lots of fluids like water, juice, or broth. This will help replace any fluids lost if you have a runny nose or fever. Warm tea or soup can help soothe a sore throat.  If the air in your home feels dry, use a cool mist humidifier. This can help a stuffy nose and make it easier to breathe.  You can also use saline nose drops to relieve stuffiness.  If you decide  to take over-the-counter cough or cold medicines, follow the directions on the label carefully. Be sure you do not take more than 1 medicine that contains acetaminophen. Also, if you have a heart problem or high blood pressure, check with your doctor before you take any of these medicines.  Wash your hands often. Cough or sneeze into a tissue or your elbow instead of your hands. This will help keep others healthy.  What follow-up care is needed?   Your doctor may ask you to make visits to the office to check on your progress. Be sure to keep these visits.  What drugs may be needed?   The doctor may order drugs to:  Open up the tubes of your lungs  Treat viral infection  Relieve or stop coughing  Help with pain from a sore throat  Relieve runny and stuffy nose  Provide oxygen  Will physical activity be limited?   You need to rest for a few days to let your body recover from the infection.  What changes to diet are needed?   Eat soft foods like soup if swallowing is too painful.  What problems could happen?   Asthma attack  Sinus infections  Lung problems like pneumonia and bronchitis  Severe fluid loss. This is dehydration.  What can be done to prevent this health problem?   Wash your hands often with soap and water for at least 20 seconds, especially after coughing or sneezing. Alcohol-based hand sanitizers also work to kill the virus.  If you are sick, cover your mouth and nose with tissue when you cough or sneeze. You can also cough into your elbow. Throw away tissues in the trash and wash your hands after touching used tissues.  Do not get too close (kissing, hugging) to people who are sick.  Do not share towels or hankies with anyone who is sick. Clean commonly handled things like door handles, remotes, toys, and phones. Wipe them with a disinfectant.  Stay away from crowded places.  Cover your nose and mouth when you sneeze or cough.  Take vitamin C to help build up your body's ability to fight disease.  Get a  flu shot each year.  When do I need to call the doctor?   You have trouble breathing when talking or sitting still.  You have a fever of 100.4°F (38°C) or higher for several days, chills, a very bad sore throat, or ear or sinus pain.  You develop a new fever after several days of feeling the same or improving.  You develop chest pain when you cough.  You have a cough that lasts more than 10 days.  You cough up blood, or the color of the mucus you cough up changes.  Teach Back: Helping You Understand   The Teach Back Method helps you understand the information we are giving you. After you talk with the staff, tell them in your own words what you learned. This helps to make sure the staff has described each thing clearly. It also helps to explain things that may have been confusing. Before going home, make sure you can do these:  I can tell you about my condition.  I can tell you what may help ease my signs.  I can tell you what I will do if I have a fever, chills, breathing very fast, or trouble breathing.  Where can I learn more?   American Lung Association  https://www.lung.org/blog/can-you-exercise-with-a-cold   American Lung Association  https://www.lung.org/lung-health-diseases/lung-disease-lookup/influenza/facts-about-the-common-cold   NHS Choices  https://www.nhs.uk/conditions/respiratory-tract-infection/   UpToDate  https://www.[employer].com/contents/the-common-cold-in-adults-beyond-the-basics   Last Reviewed Date   2021-06-08  Consumer Information Use and Disclaimer   This information is not specific medical advice and does not replace information you receive from your health care provider. This is only a brief summary of general information. It does NOT include all information about conditions, illnesses, injuries, tests, procedures, treatments, therapies, discharge instructions or life-style choices that may apply to you. You must talk with your health care provider for complete information about your  health and treatment options. This information should not be used to decide whether or not to accept your health care providers advice, instructions or recommendations. Only your health care provider has the knowledge and training to provide advice that is right for you.  Copyright   Copyright © 2021 [employer] and its affiliates and/or licensors. All rights reserved.

## 2022-11-18 ENCOUNTER — OFFICE VISIT (OUTPATIENT)
Dept: OPHTHALMOLOGY | Facility: CLINIC | Age: 52
End: 2022-11-18
Payer: MEDICAID

## 2022-11-18 DIAGNOSIS — H52.4 HYPEROPIA WITH ASTIGMATISM AND PRESBYOPIA, BILATERAL: ICD-10-CM

## 2022-11-18 DIAGNOSIS — H52.03 HYPEROPIA WITH ASTIGMATISM AND PRESBYOPIA, BILATERAL: ICD-10-CM

## 2022-11-18 DIAGNOSIS — H04.123 DRY EYE SYNDROME, BILATERAL: ICD-10-CM

## 2022-11-18 DIAGNOSIS — E11.9 DIABETES MELLITUS WITHOUT COMPLICATION: Primary | ICD-10-CM

## 2022-11-18 DIAGNOSIS — H52.203 HYPEROPIA WITH ASTIGMATISM AND PRESBYOPIA, BILATERAL: ICD-10-CM

## 2022-11-18 PROCEDURE — 2023F DILAT RTA XM W/O RTNOPTHY: CPT | Mod: CPTII,,, | Performed by: OPTOMETRIST

## 2022-11-18 PROCEDURE — 92014 COMPRE OPH EXAM EST PT 1/>: CPT | Mod: S$PBB,,, | Performed by: OPTOMETRIST

## 2022-11-18 PROCEDURE — 99999 PR PBB SHADOW E&M-EST. PATIENT-LVL III: CPT | Mod: PBBFAC,,, | Performed by: OPTOMETRIST

## 2022-11-18 PROCEDURE — 3046F PR MOST RECENT HEMOGLOBIN A1C LEVEL > 9.0%: ICD-10-PCS | Mod: CPTII,,, | Performed by: OPTOMETRIST

## 2022-11-18 PROCEDURE — 3061F PR NEG MICROALBUMINURIA RESULT DOCUMENTED/REVIEW: ICD-10-PCS | Mod: CPTII,,, | Performed by: OPTOMETRIST

## 2022-11-18 PROCEDURE — 4010F PR ACE/ARB THEARPY RXD/TAKEN: ICD-10-PCS | Mod: CPTII,,, | Performed by: OPTOMETRIST

## 2022-11-18 PROCEDURE — 1159F MED LIST DOCD IN RCRD: CPT | Mod: CPTII,,, | Performed by: OPTOMETRIST

## 2022-11-18 PROCEDURE — 3046F HEMOGLOBIN A1C LEVEL >9.0%: CPT | Mod: CPTII,,, | Performed by: OPTOMETRIST

## 2022-11-18 PROCEDURE — 99999 PR PBB SHADOW E&M-EST. PATIENT-LVL III: ICD-10-PCS | Mod: PBBFAC,,, | Performed by: OPTOMETRIST

## 2022-11-18 PROCEDURE — 99213 OFFICE O/P EST LOW 20 MIN: CPT | Mod: PBBFAC | Performed by: OPTOMETRIST

## 2022-11-18 PROCEDURE — 2023F PR DILATED RETINAL EXAM W/O EVID OF RETINOPATHY: ICD-10-PCS | Mod: CPTII,,, | Performed by: OPTOMETRIST

## 2022-11-18 PROCEDURE — 92014 PR EYE EXAM, EST PATIENT,COMPREHESV: ICD-10-PCS | Mod: S$PBB,,, | Performed by: OPTOMETRIST

## 2022-11-18 PROCEDURE — 3066F NEPHROPATHY DOC TX: CPT | Mod: CPTII,,, | Performed by: OPTOMETRIST

## 2022-11-18 PROCEDURE — 1159F PR MEDICATION LIST DOCUMENTED IN MEDICAL RECORD: ICD-10-PCS | Mod: CPTII,,, | Performed by: OPTOMETRIST

## 2022-11-18 PROCEDURE — 3061F NEG MICROALBUMINURIA REV: CPT | Mod: CPTII,,, | Performed by: OPTOMETRIST

## 2022-11-18 PROCEDURE — 3066F PR DOCUMENTATION OF TREATMENT FOR NEPHROPATHY: ICD-10-PCS | Mod: CPTII,,, | Performed by: OPTOMETRIST

## 2022-11-18 PROCEDURE — 4010F ACE/ARB THERAPY RXD/TAKEN: CPT | Mod: CPTII,,, | Performed by: OPTOMETRIST

## 2022-11-21 NOTE — PROGRESS NOTES
HPI     Annual Exam            Comments: Patient here for routine diabetic eye exam           Diabetic Eye Exam            Comments: Lab Results       Component                Value               Date                       HGBA1C                   10.8 (H)            10/14/2022                     Comments    Diabetic eye exam  Diagnosed with diabetes several years  Recent vision fluctuations no  PCP Ethan  Last A1C 10.8  Vision changes since last eye exam?: No     Any eye pain today: No    Other ocular symptoms: Tearing eyes    Interested in contact lens fitting today? No             Last edited by Courtney Chavarria, PCT on 11/18/2022  1:56 PM.            Assessment /Plan     For exam results, see Encounter Report.    Diabetes mellitus without complication  Last A1c 10.8 Stressed importance of DM control to preserve vision. No diabetic retinopathy was seen in either eye today. Continue strict blood glucose control.  Reviewed importance of yearly dilated eye exams. Continue close care with PCP regarding diabetes.    Dry eyes both eyes  Discussed warm compresses twice daily and lid hygiene, with preservative free artificial tears instillation four times daily. Patient to return to clinic if no improvement in symptoms with current treatment.     Hyperopia with astigmatism and presbyopia, bilateral  Eyeglass Final Rx       Eyeglass Final Rx         Sphere Cylinder Axis Add    Right +2.00 +0.50 110 +2.50    Left +0.50 +1.50 080 +2.50      Type: PAL    Expiration Date: 11/18/2023                    RTC 1 yr for dilated eye exam or sooner if any changes to vision.   Discussed above and answered questions.

## 2022-12-05 ENCOUNTER — PATIENT MESSAGE (OUTPATIENT)
Dept: INTERNAL MEDICINE | Facility: CLINIC | Age: 52
End: 2022-12-05
Payer: MEDICAID

## 2022-12-15 ENCOUNTER — OFFICE VISIT (OUTPATIENT)
Dept: INTERNAL MEDICINE | Facility: CLINIC | Age: 52
End: 2022-12-15
Payer: MEDICAID

## 2022-12-15 VITALS
DIASTOLIC BLOOD PRESSURE: 70 MMHG | HEART RATE: 114 BPM | HEIGHT: 72 IN | OXYGEN SATURATION: 96 % | SYSTOLIC BLOOD PRESSURE: 110 MMHG | BODY MASS INDEX: 32.01 KG/M2 | TEMPERATURE: 96 F | WEIGHT: 236.31 LBS

## 2022-12-15 DIAGNOSIS — F41.9 ANXIETY AND DEPRESSION: ICD-10-CM

## 2022-12-15 DIAGNOSIS — F32.A ANXIETY AND DEPRESSION: ICD-10-CM

## 2022-12-15 DIAGNOSIS — K21.9 GERD WITHOUT ESOPHAGITIS: ICD-10-CM

## 2022-12-15 DIAGNOSIS — I10 MODERATE HYPERTENSION: ICD-10-CM

## 2022-12-15 DIAGNOSIS — R05.9 COUGH, UNSPECIFIED TYPE: ICD-10-CM

## 2022-12-15 DIAGNOSIS — E11.9 TYPE 2 DIABETES MELLITUS WITHOUT COMPLICATION, WITHOUT LONG-TERM CURRENT USE OF INSULIN: Primary | ICD-10-CM

## 2022-12-15 DIAGNOSIS — F10.11 HISTORY OF ALCOHOL ABUSE: ICD-10-CM

## 2022-12-15 DIAGNOSIS — M77.10 LATERAL EPICONDYLITIS, UNSPECIFIED LATERALITY: ICD-10-CM

## 2022-12-15 DIAGNOSIS — I10 DIABETES MELLITUS WITH COINCIDENT HYPERTENSION: ICD-10-CM

## 2022-12-15 DIAGNOSIS — E11.9 DIABETES MELLITUS WITH COINCIDENT HYPERTENSION: ICD-10-CM

## 2022-12-15 PROCEDURE — 3078F DIAST BP <80 MM HG: CPT | Mod: CPTII,,, | Performed by: FAMILY MEDICINE

## 2022-12-15 PROCEDURE — 3078F PR MOST RECENT DIASTOLIC BLOOD PRESSURE < 80 MM HG: ICD-10-PCS | Mod: CPTII,,, | Performed by: FAMILY MEDICINE

## 2022-12-15 PROCEDURE — 1159F MED LIST DOCD IN RCRD: CPT | Mod: CPTII,,, | Performed by: FAMILY MEDICINE

## 2022-12-15 PROCEDURE — 3066F NEPHROPATHY DOC TX: CPT | Mod: CPTII,,, | Performed by: FAMILY MEDICINE

## 2022-12-15 PROCEDURE — 99214 PR OFFICE/OUTPT VISIT, EST, LEVL IV, 30-39 MIN: ICD-10-PCS | Mod: S$PBB,,, | Performed by: FAMILY MEDICINE

## 2022-12-15 PROCEDURE — 3072F PR LOW RISK FOR RETINOPATHY: ICD-10-PCS | Mod: CPTII,,, | Performed by: FAMILY MEDICINE

## 2022-12-15 PROCEDURE — 3008F BODY MASS INDEX DOCD: CPT | Mod: CPTII,,, | Performed by: FAMILY MEDICINE

## 2022-12-15 PROCEDURE — 90471 IMMUNIZATION ADMIN: CPT | Mod: PBBFAC

## 2022-12-15 PROCEDURE — 99215 OFFICE O/P EST HI 40 MIN: CPT | Mod: PBBFAC | Performed by: FAMILY MEDICINE

## 2022-12-15 PROCEDURE — 3008F PR BODY MASS INDEX (BMI) DOCUMENTED: ICD-10-PCS | Mod: CPTII,,, | Performed by: FAMILY MEDICINE

## 2022-12-15 PROCEDURE — 3061F PR NEG MICROALBUMINURIA RESULT DOCUMENTED/REVIEW: ICD-10-PCS | Mod: CPTII,,, | Performed by: FAMILY MEDICINE

## 2022-12-15 PROCEDURE — 3046F PR MOST RECENT HEMOGLOBIN A1C LEVEL > 9.0%: ICD-10-PCS | Mod: CPTII,,, | Performed by: FAMILY MEDICINE

## 2022-12-15 PROCEDURE — 3072F LOW RISK FOR RETINOPATHY: CPT | Mod: CPTII,,, | Performed by: FAMILY MEDICINE

## 2022-12-15 PROCEDURE — 3074F PR MOST RECENT SYSTOLIC BLOOD PRESSURE < 130 MM HG: ICD-10-PCS | Mod: CPTII,,, | Performed by: FAMILY MEDICINE

## 2022-12-15 PROCEDURE — 3074F SYST BP LT 130 MM HG: CPT | Mod: CPTII,,, | Performed by: FAMILY MEDICINE

## 2022-12-15 PROCEDURE — 99999 PR PBB SHADOW E&M-EST. PATIENT-LVL V: ICD-10-PCS | Mod: PBBFAC,,, | Performed by: FAMILY MEDICINE

## 2022-12-15 PROCEDURE — 4010F PR ACE/ARB THEARPY RXD/TAKEN: ICD-10-PCS | Mod: CPTII,,, | Performed by: FAMILY MEDICINE

## 2022-12-15 PROCEDURE — 3061F NEG MICROALBUMINURIA REV: CPT | Mod: CPTII,,, | Performed by: FAMILY MEDICINE

## 2022-12-15 PROCEDURE — 1159F PR MEDICATION LIST DOCUMENTED IN MEDICAL RECORD: ICD-10-PCS | Mod: CPTII,,, | Performed by: FAMILY MEDICINE

## 2022-12-15 PROCEDURE — 99214 OFFICE O/P EST MOD 30 MIN: CPT | Mod: S$PBB,,, | Performed by: FAMILY MEDICINE

## 2022-12-15 PROCEDURE — 4010F ACE/ARB THERAPY RXD/TAKEN: CPT | Mod: CPTII,,, | Performed by: FAMILY MEDICINE

## 2022-12-15 PROCEDURE — 99999 PR PBB SHADOW E&M-EST. PATIENT-LVL V: CPT | Mod: PBBFAC,,, | Performed by: FAMILY MEDICINE

## 2022-12-15 PROCEDURE — 3046F HEMOGLOBIN A1C LEVEL >9.0%: CPT | Mod: CPTII,,, | Performed by: FAMILY MEDICINE

## 2022-12-15 PROCEDURE — 3066F PR DOCUMENTATION OF TREATMENT FOR NEPHROPATHY: ICD-10-PCS | Mod: CPTII,,, | Performed by: FAMILY MEDICINE

## 2022-12-15 RX ORDER — ORAL SEMAGLUTIDE 7 MG/1
7 TABLET ORAL DAILY
Qty: 30 TABLET | Refills: 5 | Status: SHIPPED | OUTPATIENT
Start: 2022-12-15 | End: 2023-05-25 | Stop reason: ALTCHOICE

## 2022-12-15 NOTE — PROGRESS NOTES
Subjective:       Patient ID: Saúl Perez is a. male.    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      Left elbow pain several weeks or longer no trauma diagnosed with tennis elbow        Past Medical History:   Diagnosis Date    Chicken pox     History of alcohol abuse     HTN (hypertension)     Type 2 diabetes mellitus      Past Surgical History:   Procedure Laterality Date    COLONOSCOPY W/ POLYPECTOMY  07/01/2019        ESOPHAGOGASTRODUODENOSCOPY      ESOPHAGOGASTRODUODENOSCOPY N/A 10/1/2020    Procedure: ESOPHAGOGASTRODUODENOSCOPY (EGD);  Surgeon: Markell Kuhn MD;  Location: [facility];  Service: Gastroenterology;  Laterality: N/A;    HERNIA REPAIR      KNEE SURGERY      left knee scope    lipomas removed       Family History   Adopted: Yes     Social History     Socioeconomic History    Marital status:    Tobacco Use    Smoking status: Never Smoker    Smokeless tobacco: Current User     Types: Chew   Substance and Sexual Activity    Alcohol use: Yes     Comment: occasional   Social History Narrative    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   Constitutional:       Appearance: He is well-developed.   HENT:      Head: Normocephalic and atraumatic.      Right Ear: External ear normal.      Left Ear: External ear normal.      Nose: Nose normal.   Eyes:      General: No scleral icterus.     Conjunctiva/sclera: Conjunctivae normal.      Pupils: Pupils are equal, round, and reactive to light.   Neck:      Vascular: No carotid bruit.   Cardiovascular:      Rate and Rhythm: Normal rate and regular rhythm.      Heart sounds: Normal heart sounds. No murmur heard.   No friction rub. No gallop.    Pulmonary:      Effort: Pulmonary effort is normal.      Breath sounds: Normal breath sounds. No wheezing.   Abdominal:      General: Bowel sounds are normal. There is no distension.      Palpations: Abdomen is soft. There is no mass.      Tenderness: There is no abdominal tenderness. There is no guarding or rebound.   Musculoskeletal:         General: No tenderness. Normal range of motion.      Cervical back: Normal range of motion and neck supple.   Lymphadenopathy:      Cervical: No cervical adenopathy.   Skin:     General: Skin is warm and dry.      Findings: No erythema or rash.   Neurological:      Mental Status: He is alert and oriented to person, place, and time.      Cranial Nerves: No cranial nerve deficit.      Coordination: Coordination normal.   Psychiatric:         Behavior: Behavior normal.         Thought Content: Thought content normal.         Judgment: Judgment normal.       Left elbow full range of motion no redness no effusion tenderness is reproduced with active left wrist flexion in the lateral epicondyle area      Assessment:       1. Uncontrolled diabetes mellitus with kidney complication, without long-term current use of insulin    2. Hypertension, unspecified type    3. GERD without esophagitis    4. Anxiety and depression    5.          Etoh overuse/elev lfts  Chr cough  Left  elbow pain, tennis elbow  Plan:    no chwing tobacco      etoh to zero    Continue nasal sprays for allergies this may be contributing    discussed alternative to Trulicity since not available ;rybelsus command is actually covered like Trulicity on his insurance since has been off  few weeks may want to resume at a moderate dosage of 7 mg daily notify too expensive or GI side effects    Follow-up month on cough and rybelsus if doing well can increase to 14 mg daily and scheduled 3 month A1c etc. then    alloow to 3 more weeks for cough to resolve if does not notify consider ACE inhibitor as the cause of switch over to ARB    Recommend over-the-counter tennis elbow band as needed ice meloxicam relative rest and check elbow at follow-up still hurting then consider physical therapy and/or orthopedics

## 2022-12-19 ENCOUNTER — PATIENT MESSAGE (OUTPATIENT)
Dept: INTERNAL MEDICINE | Facility: CLINIC | Age: 52
End: 2022-12-19
Payer: MEDICAID

## 2022-12-28 ENCOUNTER — PATIENT MESSAGE (OUTPATIENT)
Dept: INTERNAL MEDICINE | Facility: CLINIC | Age: 52
End: 2022-12-28
Payer: MEDICAID

## 2023-01-05 ENCOUNTER — TELEPHONE (OUTPATIENT)
Dept: INTERNAL MEDICINE | Facility: CLINIC | Age: 53
End: 2023-01-05
Payer: MEDICAID

## 2023-01-05 NOTE — TELEPHONE ENCOUNTER
Spoke with representative with [employer] regarding prior authorization for rybelsus. Informed rep PA was submitted. Per rep diagnosis code is missing. States she will send over fax of PA for completion. Verbalized understanding.//ddw

## 2023-01-09 ENCOUNTER — HOSPITAL ENCOUNTER (OUTPATIENT)
Dept: RADIOLOGY | Facility: CLINIC | Age: 53
Discharge: HOME | End: 2023-01-09
Attending: PHYSICIAN ASSISTANT
Payer: MEDICAID

## 2023-01-09 ENCOUNTER — OFFICE VISIT (OUTPATIENT)
Dept: URGENT CARE | Facility: CLINIC | Age: 53
End: 2023-01-09
Payer: MEDICAID

## 2023-01-09 VITALS
OXYGEN SATURATION: 95 % | HEIGHT: 72 IN | HEART RATE: 98 BPM | WEIGHT: 233 LBS | BODY MASS INDEX: 31.56 KG/M2 | DIASTOLIC BLOOD PRESSURE: 71 MMHG | RESPIRATION RATE: 14 BRPM | SYSTOLIC BLOOD PRESSURE: 106 MMHG | TEMPERATURE: 97 F

## 2023-01-09 DIAGNOSIS — K21.9 GASTROESOPHAGEAL REFLUX DISEASE, UNSPECIFIED WHETHER ESOPHAGITIS PRESENT: ICD-10-CM

## 2023-01-09 DIAGNOSIS — R05.3 CHRONIC COUGH: ICD-10-CM

## 2023-01-09 DIAGNOSIS — J40 BRONCHITIS: Primary | ICD-10-CM

## 2023-01-09 PROBLEM — H11.30 SUBCONJUNCTIVAL HEMORRHAGE: Status: ACTIVE | Noted: 2021-05-05

## 2023-01-09 PROBLEM — S05.10XA: Status: ACTIVE | Noted: 2021-05-05

## 2023-01-09 PROCEDURE — 3008F PR BODY MASS INDEX (BMI) DOCUMENTED: ICD-10-PCS | Mod: CPTII,S$GLB,, | Performed by: PHYSICIAN ASSISTANT

## 2023-01-09 PROCEDURE — 1159F MED LIST DOCD IN RCRD: CPT | Mod: CPTII,S$GLB,, | Performed by: PHYSICIAN ASSISTANT

## 2023-01-09 PROCEDURE — 99213 OFFICE O/P EST LOW 20 MIN: CPT | Mod: S$GLB,,, | Performed by: PHYSICIAN ASSISTANT

## 2023-01-09 PROCEDURE — 3072F LOW RISK FOR RETINOPATHY: CPT | Mod: CPTII,S$GLB,, | Performed by: PHYSICIAN ASSISTANT

## 2023-01-09 PROCEDURE — 99213 PR OFFICE/OUTPT VISIT, EST, LEVL III, 20-29 MIN: ICD-10-PCS | Mod: S$GLB,,, | Performed by: PHYSICIAN ASSISTANT

## 2023-01-09 PROCEDURE — 3074F SYST BP LT 130 MM HG: CPT | Mod: CPTII,S$GLB,, | Performed by: PHYSICIAN ASSISTANT

## 2023-01-09 PROCEDURE — 3078F DIAST BP <80 MM HG: CPT | Mod: CPTII,S$GLB,, | Performed by: PHYSICIAN ASSISTANT

## 2023-01-09 PROCEDURE — 71046 X-RAY EXAM CHEST 2 VIEWS: CPT | Mod: S$GLB,,, | Performed by: RADIOLOGY

## 2023-01-09 PROCEDURE — 71046 XR CHEST PA AND LATERAL: ICD-10-PCS | Mod: S$GLB,,, | Performed by: RADIOLOGY

## 2023-01-09 PROCEDURE — 3072F PR LOW RISK FOR RETINOPATHY: ICD-10-PCS | Mod: CPTII,S$GLB,, | Performed by: PHYSICIAN ASSISTANT

## 2023-01-09 PROCEDURE — 1160F RVW MEDS BY RX/DR IN RCRD: CPT | Mod: CPTII,S$GLB,, | Performed by: PHYSICIAN ASSISTANT

## 2023-01-09 PROCEDURE — 1159F PR MEDICATION LIST DOCUMENTED IN MEDICAL RECORD: ICD-10-PCS | Mod: CPTII,S$GLB,, | Performed by: PHYSICIAN ASSISTANT

## 2023-01-09 PROCEDURE — 1160F PR REVIEW ALL MEDS BY PRESCRIBER/CLIN PHARMACIST DOCUMENTED: ICD-10-PCS | Mod: CPTII,S$GLB,, | Performed by: PHYSICIAN ASSISTANT

## 2023-01-09 PROCEDURE — 3074F PR MOST RECENT SYSTOLIC BLOOD PRESSURE < 130 MM HG: ICD-10-PCS | Mod: CPTII,S$GLB,, | Performed by: PHYSICIAN ASSISTANT

## 2023-01-09 PROCEDURE — 3008F BODY MASS INDEX DOCD: CPT | Mod: CPTII,S$GLB,, | Performed by: PHYSICIAN ASSISTANT

## 2023-01-09 PROCEDURE — 3078F PR MOST RECENT DIASTOLIC BLOOD PRESSURE < 80 MM HG: ICD-10-PCS | Mod: CPTII,S$GLB,, | Performed by: PHYSICIAN ASSISTANT

## 2023-01-09 RX ORDER — PROMETHAZINE HYDROCHLORIDE AND DEXTROMETHORPHAN HYDROBROMIDE 6.25; 15 MG/5ML; MG/5ML
5 SYRUP ORAL NIGHTLY PRN
Qty: 50 ML | Refills: 0 | Status: SHIPPED | OUTPATIENT
Start: 2023-01-09 | End: 2023-01-19

## 2023-01-09 RX ORDER — DULAGLUTIDE 3 MG/.5ML
INJECTION, SOLUTION SUBCUTANEOUS
COMMUNITY
Start: 2023-01-06 | End: 2023-07-14 | Stop reason: DRUGHIGH

## 2023-01-09 RX ORDER — ALBUTEROL SULFATE 90 UG/1
2 AEROSOL, METERED RESPIRATORY (INHALATION) EVERY 6 HOURS PRN
Qty: 18 G | Refills: 0 | Status: SHIPPED | OUTPATIENT
Start: 2023-01-09 | End: 2024-01-09

## 2023-01-09 RX ORDER — PREDNISONE 10 MG/1
10 TABLET ORAL DAILY
Qty: 5 TABLET | Refills: 0 | Status: SHIPPED | OUTPATIENT
Start: 2023-01-09 | End: 2023-01-14

## 2023-01-09 RX ORDER — AZITHROMYCIN 250 MG/1
TABLET, FILM COATED ORAL
Qty: 6 TABLET | Refills: 0 | Status: SHIPPED | OUTPATIENT
Start: 2023-01-09 | End: 2023-01-14

## 2023-01-09 NOTE — PROGRESS NOTES
Subjective:       Patient ID: Saúl Perez is a 52 y.o. male.    Vitals:  height is 6' (1.829 m) and weight is 105.7 kg (233 lb). His tympanic temperature is 97 °F (36.1 °C). His blood pressure is 106/71 and his pulse is 98. His respiration is 14 and oxygen saturation is 95%.     Chief Complaint: Cough    Patient is a 52 year old male who presents with persistent cough (3 months). Patient states he began with URI symptoms several months ago and has had continued cough since. He denies worsening of URI symptoms but admits continued cough. He has been coughing up mucus and has had 2 episodes of vomiitng after coughing fits. NO fever, chills, chest pain associated. Patient states he does have shortness of breath with fits from time to time. Patient states cough is worse at night. No nausea or diarrhea. Patient denies any palpitations or LE edema. OTC medications have not given relief.     Cough  This is a new problem. The current episode started more than 1 month ago (3). The problem has been gradually worsening. The problem occurs every few minutes. The cough is Productive of sputum. Associated symptoms include nasal congestion, postnasal drip, a sore throat, shortness of breath and wheezing. Pertinent negatives include no chest pain, chills, ear congestion, ear pain, fever, headaches, heartburn, hemoptysis, myalgias, rash, rhinorrhea, sweats or weight loss. Nothing aggravates the symptoms. He has tried OTC cough suppressant for the symptoms. The treatment provided no relief. There is no history of asthma, bronchiectasis, bronchitis, COPD, emphysema, environmental allergies or pneumonia.     Constitution: Negative for chills and fever.   HENT:  Positive for congestion, postnasal drip and sore throat. Negative for ear pain, sinus pain, sinus pressure and trouble swallowing.    Neck: Negative for painful lymph nodes.   Cardiovascular:  Negative for chest pain, leg swelling and palpitations.   Respiratory:  Positive  for cough, sputum production, shortness of breath and wheezing. Negative for bloody sputum and asthma.    Gastrointestinal:  Negative for abdominal pain, nausea, vomiting, diarrhea and heartburn.   Musculoskeletal:  Negative for muscle ache.   Skin:  Negative for rash.   Allergic/Immunologic: Negative for environmental allergies and asthma.   Neurological:  Negative for headaches.   Hematologic/Lymphatic: Negative for swollen lymph nodes.     Objective:      Physical Exam   Constitutional: He is oriented to person, place, and time. He appears well-developed. He is cooperative.  Non-toxic appearance. He does not appear ill. No distress.   HENT:   Head: Normocephalic and atraumatic.   Ears:   Right Ear: Hearing, tympanic membrane, external ear and ear canal normal.   Left Ear: Hearing, tympanic membrane, external ear and ear canal normal.   Nose: Congestion present. No mucosal edema, rhinorrhea or nasal deformity. No epistaxis. Right sinus exhibits no maxillary sinus tenderness and no frontal sinus tenderness. Left sinus exhibits no maxillary sinus tenderness and no frontal sinus tenderness.   Mouth/Throat: Uvula is midline and mucous membranes are normal. No trismus in the jaw. Normal dentition. No uvula swelling. Posterior oropharyngeal erythema present. No oropharyngeal exudate or posterior oropharyngeal edema.   Eyes: Conjunctivae and lids are normal. No scleral icterus.   Neck: Trachea normal and phonation normal. Neck supple. No edema present. No erythema present. No neck rigidity present.   Cardiovascular: Normal rate, regular rhythm, normal heart sounds and normal pulses.   No murmur heard.  Pulmonary/Chest: Effort normal and breath sounds normal. No respiratory distress. He has no decreased breath sounds. He has no wheezes. He has no rhonchi. He exhibits no tenderness.   Abdominal: Normal appearance.   Musculoskeletal: Normal range of motion.         General: No deformity. Normal range of motion.    Lymphadenopathy:     He has no cervical adenopathy.   Neurological: He is alert and oriented to person, place, and time. He exhibits normal muscle tone. Coordination normal.   Skin: Skin is warm, dry, intact, not diaphoretic and not pale.   Psychiatric: His speech is normal and behavior is normal. Judgment and thought content normal.   Nursing note and vitals reviewed.        X-Ray Chest PA And Lateral    Result Date: 1/9/2023  EXAMINATION: XR CHEST PA AND LATERAL CLINICAL HISTORY: Chronic cough TECHNIQUE: PA and lateral views of the chest were performed. COMPARISON: 01/16/2020. FINDINGS: The trachea is unremarkable.  The cardiomediastinal silhouette is within normal limits.  The hemidiaphragms are unremarkable.  There is no evidence of free air beneath the hemidiaphragms.  There are no pleural effusions.  There is no evidence of a pneumothorax.  There is no evidence of pneumomediastinum.  No airspace opacity is present.  The osseous structures are unremarkable.     No acute process. Electronically signed by: Anastacio Yanes MD Date:    01/09/2023 Time:    14:54     Assessment:       1. Bronchitis    2. Gastroesophageal reflux disease, unspecified whether esophagitis present          Plan:         Bronchitis  -     X-Ray Chest PA And Lateral; Future; Expected date: 01/09/2023  -     albuterol (PROVENTIL HFA) 90 mcg/actuation inhaler; Inhale 2 puffs into the lungs every 6 (six) hours as needed for Wheezing. Rescue  Dispense: 18 g; Refill: 0  -     promethazine-dextromethorphan (PROMETHAZINE-DM) 6.25-15 mg/5 mL Syrp; Take 5 mLs by mouth nightly as needed (cough).  Dispense: 50 mL; Refill: 0  -     predniSONE (DELTASONE) 10 MG tablet; Take 1 tablet (10 mg total) by mouth once daily. for 5 days  Dispense: 5 tablet; Refill: 0  -     azithromycin (Z-EVELIN) 250 MG tablet; Take 2 tablets by mouth on day 1; Take 1 tablet by mouth on days 2-5  Dispense: 6 tablet; Refill: 0    Gastroesophageal reflux disease, unspecified whether  esophagitis present    Discussed normal CXR today in clinic.   Discussed use of OTC medications for symptom control as well as prescribed medications for suspected bronchitis.   Discussed a contributing factor of continued cough can be GERD. Patient is currently taking omeprazole as needed. Discussed begin taking this medication BID for 14 days and follow up with PCP.  All ER precautions covered including but not limited to shortness of breath, intractable fever, or chest pain.  Discussed RTC if symptoms worsen, change, or persist.     Patient verbalized understanding and agreed with the plan.     Melissa Abraham PA-C    Patient Instructions   Begin taking omeprazole twice daily 30 minutes before breakfast and 30 minutes before dinner.     Take oral treatment for bronchitis as prescribed.   Follow up with your PCP if no improvement in 3-4 days.       PLEASE READ YOUR DISCHARGE INSTRUCTIONS ENTIRELY AS IT CONTAINS IMPORTANT INFORMATION.      Please drink plenty of fluids.    Please get plenty of rest.    Please return here or go to the Emergency Department for any concerns or worsening of condition.    Please take an over the counter antihistamine medication (allegra/Claritin/Zyrtec) of your choice as directed.    Try an over the counter decongestant like Mucinex D or Sudafed. You buy this behind the pharmacy counter    If you do have Hypertension or palpitations, it is safe to take Coricidin HBP for relief of sinus symptoms.    If not allergic, please take over the counter Tylenol (Acetaminophen) and/or Motrin (Ibuprofen) as directed for control of pain and/or fever.  Please follow up with your primary care doctor or specialist as needed.    Sore throat recommendations: Warm fluids, warm salt water gargles, throat lozenges, tea, honey, soup, rest, hydration.    Use over the counter flonase: one spray each nostril twice daily OR two sprays each nostril once daily.     Sinus rinses DO NOT USE TAP WATER, if you must,  water must be a rolling boil for 1 minute, let it cool, then use.  May use distilled water, or over the counter nasal saline rinses.  Vics vapor rub in shower to help open nasal passages.  May use nasal gel to keep passages moisturized.  May use Nasal saline sprays during the day for added relief of congestion.   For those who go to the gym, please do not use the sauna or steam room now to clear sinuses.    If you  smoke, please stop smoking.      Please return or see your primary care doctor if you develop new or worsening symptoms.     Please arrange follow up with your primary medical clinic as soon as possible. You must understand that you've received an Urgent Care treatment only and that you may be released before all of your medical problems are known or treated. You, the patient, will arrange for follow up as instructed. If your symptoms worsen or fail to improve you should go to the Emergency Room.

## 2023-01-09 NOTE — PATIENT INSTRUCTIONS
Begin taking omeprazole twice daily 30 minutes before breakfast and 30 minutes before dinner.     Take oral treatment for bronchitis as prescribed.   Follow up with your PCP if no improvement in 3-4 days.       PLEASE READ YOUR DISCHARGE INSTRUCTIONS ENTIRELY AS IT CONTAINS IMPORTANT INFORMATION.      Please drink plenty of fluids.    Please get plenty of rest.    Please return here or go to the Emergency Department for any concerns or worsening of condition.    Please take an over the counter antihistamine medication (allegra/Claritin/Zyrtec) of your choice as directed.    Try an over the counter decongestant like Mucinex D or Sudafed. You buy this behind the pharmacy counter    If you do have Hypertension or palpitations, it is safe to take Coricidin HBP for relief of sinus symptoms.    If not allergic, please take over the counter Tylenol (Acetaminophen) and/or Motrin (Ibuprofen) as directed for control of pain and/or fever.  Please follow up with your primary care doctor or specialist as needed.    Sore throat recommendations: Warm fluids, warm salt water gargles, throat lozenges, tea, honey, soup, rest, hydration.    Use over the counter flonase: one spray each nostril twice daily OR two sprays each nostril once daily.     Sinus rinses DO NOT USE TAP WATER, if you must, water must be a rolling boil for 1 minute, let it cool, then use.  May use distilled water, or over the counter nasal saline rinses.  Vics vapor rub in shower to help open nasal passages.  May use nasal gel to keep passages moisturized.  May use Nasal saline sprays during the day for added relief of congestion.   For those who go to the gym, please do not use the sauna or steam room now to clear sinuses.    If you  smoke, please stop smoking.      Please return or see your primary care doctor if you develop new or worsening symptoms.     Please arrange follow up with your primary medical clinic as soon as possible. You must understand that  you've received an Urgent Care treatment only and that you may be released before all of your medical problems are known or treated. You, the patient, will arrange for follow up as instructed. If your symptoms worsen or fail to improve you should go to the Emergency Room.

## 2023-02-09 ENCOUNTER — OFFICE VISIT (OUTPATIENT)
Dept: DERMATOLOGY | Facility: CLINIC | Age: 53
End: 2023-02-09
Payer: MEDICAID

## 2023-02-09 DIAGNOSIS — L81.4 SOLAR LENTIGO: ICD-10-CM

## 2023-02-09 DIAGNOSIS — L73.9 FOLLICULITIS: Primary | ICD-10-CM

## 2023-02-09 DIAGNOSIS — D18.00 HEMANGIOMA, UNSPECIFIED SITE: ICD-10-CM

## 2023-02-09 DIAGNOSIS — D22.9 MULTIPLE BENIGN NEVI: ICD-10-CM

## 2023-02-09 DIAGNOSIS — L30.9 ECZEMA, UNSPECIFIED TYPE: ICD-10-CM

## 2023-02-09 DIAGNOSIS — L57.0 ACTINIC KERATOSES: ICD-10-CM

## 2023-02-09 PROCEDURE — 17003 DESTRUCT PREMALG LES 2-14: CPT | Mod: S$PBB,,, | Performed by: STUDENT IN AN ORGANIZED HEALTH CARE EDUCATION/TRAINING PROGRAM

## 2023-02-09 PROCEDURE — 99999 PR PBB SHADOW E&M-EST. PATIENT-LVL III: CPT | Mod: PBBFAC,,, | Performed by: STUDENT IN AN ORGANIZED HEALTH CARE EDUCATION/TRAINING PROGRAM

## 2023-02-09 PROCEDURE — 1160F PR REVIEW ALL MEDS BY PRESCRIBER/CLIN PHARMACIST DOCUMENTED: ICD-10-PCS | Mod: CPTII,,, | Performed by: STUDENT IN AN ORGANIZED HEALTH CARE EDUCATION/TRAINING PROGRAM

## 2023-02-09 PROCEDURE — 99214 OFFICE O/P EST MOD 30 MIN: CPT | Mod: 25,S$PBB,, | Performed by: STUDENT IN AN ORGANIZED HEALTH CARE EDUCATION/TRAINING PROGRAM

## 2023-02-09 PROCEDURE — 3072F PR LOW RISK FOR RETINOPATHY: ICD-10-PCS | Mod: CPTII,,, | Performed by: STUDENT IN AN ORGANIZED HEALTH CARE EDUCATION/TRAINING PROGRAM

## 2023-02-09 PROCEDURE — 17000 DESTRUCT PREMALG LESION: CPT | Mod: S$PBB,,, | Performed by: STUDENT IN AN ORGANIZED HEALTH CARE EDUCATION/TRAINING PROGRAM

## 2023-02-09 PROCEDURE — 1160F RVW MEDS BY RX/DR IN RCRD: CPT | Mod: CPTII,,, | Performed by: STUDENT IN AN ORGANIZED HEALTH CARE EDUCATION/TRAINING PROGRAM

## 2023-02-09 PROCEDURE — 17003 DESTRUCT PREMALG LES 2-14: CPT | Mod: PBBFAC | Performed by: STUDENT IN AN ORGANIZED HEALTH CARE EDUCATION/TRAINING PROGRAM

## 2023-02-09 PROCEDURE — 1159F PR MEDICATION LIST DOCUMENTED IN MEDICAL RECORD: ICD-10-PCS | Mod: CPTII,,, | Performed by: STUDENT IN AN ORGANIZED HEALTH CARE EDUCATION/TRAINING PROGRAM

## 2023-02-09 PROCEDURE — 3072F LOW RISK FOR RETINOPATHY: CPT | Mod: CPTII,,, | Performed by: STUDENT IN AN ORGANIZED HEALTH CARE EDUCATION/TRAINING PROGRAM

## 2023-02-09 PROCEDURE — 1159F MED LIST DOCD IN RCRD: CPT | Mod: CPTII,,, | Performed by: STUDENT IN AN ORGANIZED HEALTH CARE EDUCATION/TRAINING PROGRAM

## 2023-02-09 PROCEDURE — 99213 OFFICE O/P EST LOW 20 MIN: CPT | Mod: PBBFAC | Performed by: STUDENT IN AN ORGANIZED HEALTH CARE EDUCATION/TRAINING PROGRAM

## 2023-02-09 PROCEDURE — 17000 DESTRUCT PREMALG LESION: CPT | Mod: PBBFAC | Performed by: STUDENT IN AN ORGANIZED HEALTH CARE EDUCATION/TRAINING PROGRAM

## 2023-02-09 PROCEDURE — 4010F ACE/ARB THERAPY RXD/TAKEN: CPT | Mod: CPTII,,, | Performed by: STUDENT IN AN ORGANIZED HEALTH CARE EDUCATION/TRAINING PROGRAM

## 2023-02-09 PROCEDURE — 99999 PR PBB SHADOW E&M-EST. PATIENT-LVL III: ICD-10-PCS | Mod: PBBFAC,,, | Performed by: STUDENT IN AN ORGANIZED HEALTH CARE EDUCATION/TRAINING PROGRAM

## 2023-02-09 PROCEDURE — 17003 DESTRUCTION, PREMALIGNANT LESIONS; SECOND THROUGH 14 LESIONS: ICD-10-PCS | Mod: S$PBB,,, | Performed by: STUDENT IN AN ORGANIZED HEALTH CARE EDUCATION/TRAINING PROGRAM

## 2023-02-09 PROCEDURE — 4010F PR ACE/ARB THEARPY RXD/TAKEN: ICD-10-PCS | Mod: CPTII,,, | Performed by: STUDENT IN AN ORGANIZED HEALTH CARE EDUCATION/TRAINING PROGRAM

## 2023-02-09 PROCEDURE — 17000 PR DESTRUCTION(LASER SURGERY,CRYOSURGERY,CHEMOSURGERY),PREMALIGNANT LESIONS,FIRST LESION: ICD-10-PCS | Mod: S$PBB,,, | Performed by: STUDENT IN AN ORGANIZED HEALTH CARE EDUCATION/TRAINING PROGRAM

## 2023-02-09 PROCEDURE — 99214 PR OFFICE/OUTPT VISIT, EST, LEVL IV, 30-39 MIN: ICD-10-PCS | Mod: 25,S$PBB,, | Performed by: STUDENT IN AN ORGANIZED HEALTH CARE EDUCATION/TRAINING PROGRAM

## 2023-02-09 RX ORDER — TRIAMCINOLONE ACETONIDE 1 MG/G
CREAM TOPICAL 2 TIMES DAILY
Qty: 80 G | Refills: 0 | Status: SHIPPED | OUTPATIENT
Start: 2023-02-09 | End: 2023-06-13

## 2023-02-09 RX ORDER — DOXYCYCLINE HYCLATE 100 MG
TABLET ORAL
Qty: 30 TABLET | Refills: 2 | Status: SHIPPED | OUTPATIENT
Start: 2023-02-09

## 2023-02-09 NOTE — PATIENT INSTRUCTIONS

## 2023-02-09 NOTE — PROGRESS NOTES
Patient Information  Name: Saúl Perez  : 1970  MRN: [id number]     Referring Physician:  Dr. Zhang ref. provider found   Primary Care Physician:  Dr. Juan Daniel Long MD   Date of Visit: 2023      Subjective:       Saúl Perez is a 53 y.o. male who presents for   Chief Complaint   Patient presents with    Skin Check     Ubse. Sx redness and bleeding.      HPI  Patient here for skin check.     Does patient have a personal hx of skin cancers? Yes, BCC on back  Does patient have family hx of melanoma?  no  Does patient have hx of strong sun exposure or tanning bed use in the past? yes    Patient was last seen:Visit date not found     Prior notes by myself reviewed.   Clinical documentation obtained by nursing staff reviewed.    Review of Systems   Skin:  Negative for itching and rash.      Objective:    Physical Exam   Constitutional: He appears well-developed and well-nourished. No distress.   Neurological: He is alert and oriented to person, place, and time. He is not disoriented.   Psychiatric: He has a normal mood and affect.   Skin:   Areas Examined (abnormalities noted in diagram):   Head / Face Inspection Performed  Neck Inspection Performed  Chest / Axilla Inspection Performed  Back Inspection Performed  RUE Inspected  LUE Inspection Performed                 Diagram Legend     Erythematous scaling macule/papule c/w actinic keratosis       Vascular papule c/w angioma      Pigmented verrucoid papule/plaque c/w seborrheic keratosis      Yellow umbilicated papule c/w sebaceous hyperplasia      Irregularly shaped tan macule c/w lentigo     1-2 mm smooth white papules consistent with Milia      Movable subcutaneous cyst with punctum c/w epidermal inclusion cyst      Subcutaneous movable cyst c/w pilar cyst      Firm pink to brown papule c/w dermatofibroma      Pedunculated fleshy papule(s) c/w skin tag(s)      Evenly pigmented macule c/w junctional nevus     Mildly variegated pigmented,  slightly irregular-bordered macule c/w mildly atypical nevus      Flesh colored to evenly pigmented papule c/w intradermal nevus       Pink pearly papule/plaque c/w basal cell carcinoma      Erythematous hyperkeratotic cursted plaque c/w SCC      Surgical scar with no sign of skin cancer recurrence      Open and closed comedones      Inflammatory papules and pustules      Verrucoid papule consistent consistent with wart     Erythematous eczematous patches and plaques     Dystrophic onycholytic nail with subungual debris c/w onychomycosis     Umbilicated papule    Erythematous-base heme-crusted tan verrucoid plaque consistent with inflamed seborrheic keratosis     Erythematous Silvery Scaling Plaque c/w Psoriasis     See annotation      No images are attached to the encounter or orders placed in the encounter.    [] Data reviewed  [] Independent review of test  [] Management discussed with another provider    Assessment / Plan:        Folliculitis  -     doxycycline (VIBRA-TABS) 100 MG tablet; Take 1 po qday with food and not within 1 hour prior to lying down  Dispense: 30 tablet; Refill: 2  Discussed benefits and risks of doxycyline therapy including but not limited to GI discomfort, esophageal irritation/ulceration, and increased sun sensitivity. Patient was counseled to take medicine with meals and at least 1 hour before lying down.     Solar lentigo  This is a benign hyperpigmented sun induced lesion. Recommend daily sun protection/avoidance and use of at least SPF 30, broad spectrum sunscreen (OTC drug) will reduce the number of new lesions. Treatment of these benign lesions are considered cosmetic.    Actinic keratoses  Cryosurgery Procedure Note    Verbal consent from the patient is obtained including, but not limited to, risk of hypopigmentation/hyperpigmentation, scar, recurrence of lesion. The patient is aware of the precancerous quality and need for treatment of these lesions. Liquid nitrogen cryosurgery  is applied to the 7 actinic keratoses, as detailed in the physical exam, to produce a freeze injury. The patient is aware that blisters may form and is instructed on wound care with gentle cleansing and use of vaseline ointment to keep moist until healed. The patient is supplied a handout on cryosurgery and is instructed to call if lesions do not completely resolve.    Hemangioma, unspecified site  This is a benign vascular lesion. Reassurance given. No treatment required.     Multiple benign nevi  Discussed ABCDE's of nevi.  Monitor for new mole or moles that are becoming bigger, darker, irritated, or developing irregular borders. Brochure provided. Instructed patient to observe lesion(s) for changes and follow up in clinic if changes are noted. Patient to monitor skin at home for new or changing lesions.     Eczema, unspecified type  -     triamcinolone acetonide 0.1% (KENALOG) 0.1 % cream; Apply topically 2 (two) times daily.  Dispense: 80 g; Refill: 0  Counseling on topical steroids:  Patient counseled that the prolonged use of topical steroids can result in the increased appearance superficial blood vessels (telangiectasias) lightening (hypopigmentation), and   thinning of the skin ( atrophy).  Patient understands to avoid using high potency steroids in skin folds, the groin or the face.  The patient verbalized understanding of proper use and possible adverse effects of topical steroids.  All patient's questions and concerns were addressed.             LOS NUMBER AND COMPLEXITY OF PROBLEMS    COMPLEXITY OF DATA RISK TOTAL TIME (m)   75758  67317 [] 1 self-limited or minor problem [x] Minimal to none [] No treatment recommended or patient to monitor 15-29  10-19   61712  52710 Low  [] 2 or > self limited or minor problems  [] 1 stable chronic illness  [] 1 acute, uncomplicated illness or injury Limited (2)  [] Prior external notes from each unique source  [] Review result of each unique test  [] Order each  unique test []  Low  OTC medications, minor skin biopsy 30-44  20-29   13558  92006 Moderate  []  1 or > chronic illness with progression, exacerbation or SE of treatment  [x]  2 or more stable chronic illnesses  []  1 acute illness with systemic symptoms  []  1 acute complicated injury  []  1 undiagnosed new problem with uncertain prognosis Moderate (1/3 below)  []  3 or more data items        *Now includes assessment requiring independent historian  []  Independent interpretation of a test  []  Discuss management/test with another provider Moderate  [x]  Prescription drug mgmt  []  Minor surgery with risk discussed  []  Mgmt limited by social determinates 45-59  30-39   26649  02178 High  []  1 or more chronic illness with severe exacerbation, progression or SE of treatment  []  1 acute or chronic illness/injury that poses a threat to life or bodily function Extensive (2/3 below)  []  3 or more data items        *Now includes assessment requiring independent historian.  []  Independent interpretation of a test  []  Discuss management/test with another provider High  []  Major surgery with risk discussed  []  Drug therapy requiring intensive monitoring for toxicity  []  Hospitalization  []  Decision for DNR 60-74  40-54      No follow-ups on file.    Evie Scruggs MD, FAAD  [facility]

## 2023-03-04 ENCOUNTER — PATIENT MESSAGE (OUTPATIENT)
Dept: INTERNAL MEDICINE | Facility: CLINIC | Age: 53
End: 2023-03-04
Payer: MEDICAID

## 2023-03-06 NOTE — TELEPHONE ENCOUNTER
L/V 12/15/22      I spoke with the patient stated that he wanted to know if Dr Long wanted him to start back on     omega-3 acid ethyl esters (LOVAZA) 1 gram capsule 360 capsule 4 4/19/2021  No   Sig - Route: Take 2 capsules (2 g total) by mouth 2 (two) times daily. - Oral     Patient stated he was prescribed this medication from Dr Verdugo. Patient stated to advise him what is next.   The patient was informed that his message will be sent to Dr Long and we will give him a call back. Patient stated understanding.

## 2023-03-08 ENCOUNTER — PATIENT MESSAGE (OUTPATIENT)
Dept: INTERNAL MEDICINE | Facility: CLINIC | Age: 53
End: 2023-03-08
Payer: MEDICAID

## 2023-03-08 NOTE — TELEPHONE ENCOUNTER
I spoke with the patient informed him that the pharmacy was refilling his prescription.    Lisinopril 40 mg dispense 90 tabs with 5 refills.    Patient stated understanding

## 2023-03-21 ENCOUNTER — PATIENT MESSAGE (OUTPATIENT)
Dept: INTERNAL MEDICINE | Facility: CLINIC | Age: 53
End: 2023-03-21
Payer: MEDICAID

## 2023-03-21 NOTE — TELEPHONE ENCOUNTER
I spoke with the patient stated that he is having an issue scheduling his podiatry appointment. The patient was informed to contact the podiatry department for assistance with scheduling his appointment. The patient stated understanding.

## 2023-03-25 ENCOUNTER — PATIENT MESSAGE (OUTPATIENT)
Dept: INTERNAL MEDICINE | Facility: CLINIC | Age: 53
End: 2023-03-25
Payer: MEDICAID

## 2023-03-25 DIAGNOSIS — E11.9 DIABETES MELLITUS WITHOUT COMPLICATION: Primary | ICD-10-CM

## 2023-03-28 RX ORDER — OMEGA-3-ACID ETHYL ESTERS 1 G/1
2 CAPSULE, LIQUID FILLED ORAL 2 TIMES DAILY
Qty: 360 CAPSULE | Refills: 4 | Status: SHIPPED | OUTPATIENT
Start: 2023-03-28

## 2023-03-28 NOTE — TELEPHONE ENCOUNTER
I tried calling the patient no answer a message was left for the patient to return the call to Dr Long office. Also, informed the patient that his refill request was sent to the pharmacy.

## 2023-04-14 ENCOUNTER — PATIENT MESSAGE (OUTPATIENT)
Dept: ADMINISTRATIVE | Facility: HOSPITAL | Age: 53
End: 2023-04-14
Payer: MEDICAID

## 2023-04-19 ENCOUNTER — PATIENT MESSAGE (OUTPATIENT)
Dept: ADMINISTRATIVE | Facility: HOSPITAL | Age: 53
End: 2023-04-19
Payer: MEDICAID

## 2023-04-19 DIAGNOSIS — E11.9 TYPE 2 DIABETES MELLITUS WITHOUT COMPLICATION, WITHOUT LONG-TERM CURRENT USE OF INSULIN: ICD-10-CM

## 2023-04-19 DIAGNOSIS — I10 DIABETES MELLITUS WITH COINCIDENT HYPERTENSION: Primary | ICD-10-CM

## 2023-04-19 DIAGNOSIS — E11.9 DIABETES MELLITUS WITH COINCIDENT HYPERTENSION: Primary | ICD-10-CM

## 2023-04-24 ENCOUNTER — PATIENT OUTREACH (OUTPATIENT)
Dept: ADMINISTRATIVE | Facility: HOSPITAL | Age: 53
End: 2023-04-24
Payer: MEDICAID

## 2023-04-24 NOTE — PROGRESS NOTES
DM lab report: Per chart review, patient has a lab appointment scheduled 4/27/23 for recheck of diabetic labs.

## 2023-04-27 ENCOUNTER — TELEPHONE (OUTPATIENT)
Dept: INTERNAL MEDICINE | Facility: CLINIC | Age: 53
End: 2023-04-27
Payer: MEDICAID

## 2023-04-27 RX ORDER — OMEGA-3-ACID ETHYL ESTERS 1 G/1
CAPSULE, LIQUID FILLED ORAL
Qty: 360 CAPSULE | Refills: 4 | OUTPATIENT
Start: 2023-04-27

## 2023-04-27 NOTE — TELEPHONE ENCOUNTER
Care Due:                  Date            Visit Type   Department     Provider  --------------------------------------------------------------------------------                                EP -                              PRIMARY      [facility] INTERNAL  Last Visit: 12-      [facility]   MEDICINE       Juan Daniel Long  Next Visit: None Scheduled  None         None Found                                                            Last  Test          Frequency    Reason                     Performed    Due Date  --------------------------------------------------------------------------------    HBA1C.......  6 months...  metFORMIN, semaglutide...  10-   04-    Lipid Panel.  12 months..  omega-3, rosuvastatin....  02- 02-    [facility] Embedded Care Gaps. Reference number: [id number]. 4/27/2023   8:25:43 AM CDT

## 2023-04-27 NOTE — TELEPHONE ENCOUNTER
Pharmacy is requesting that a PRIOR AUTHORIZATION be completed for the OMEGA-3 ETHYL ESTERS 1 GM CAP. Please forward this request to the staff member handling PAs for your clinic. Thank you.

## 2023-04-28 ENCOUNTER — LAB VISIT (OUTPATIENT)
Dept: LAB | Facility: HOSPITAL | Age: 53
End: 2023-04-28
Attending: FAMILY MEDICINE
Payer: MEDICAID

## 2023-04-28 DIAGNOSIS — E11.9 DIABETES MELLITUS WITH COINCIDENT HYPERTENSION: ICD-10-CM

## 2023-04-28 DIAGNOSIS — I10 DIABETES MELLITUS WITH COINCIDENT HYPERTENSION: ICD-10-CM

## 2023-04-28 DIAGNOSIS — E11.9 DIABETES MELLITUS WITHOUT COMPLICATION: ICD-10-CM

## 2023-04-28 DIAGNOSIS — E11.9 TYPE 2 DIABETES MELLITUS WITHOUT COMPLICATION, WITHOUT LONG-TERM CURRENT USE OF INSULIN: ICD-10-CM

## 2023-04-28 LAB
ALBUMIN SERPL BCP-MCNC: 3.9 G/DL (ref 3.5–5.2)
ALBUMIN/CREAT UR: 20.1 UG/MG (ref 0–30)
ALP SERPL-CCNC: 130 U/L (ref 55–135)
ALT SERPL W/O P-5'-P-CCNC: 85 U/L (ref 10–44)
ANION GAP SERPL CALC-SCNC: 13 MMOL/L (ref 8–16)
AST SERPL-CCNC: 98 U/L (ref 10–40)
BILIRUB SERPL-MCNC: 1.5 MG/DL (ref 0.1–1)
BUN SERPL-MCNC: 11 MG/DL (ref 6–20)
CALCIUM SERPL-MCNC: 9.7 MG/DL (ref 8.7–10.5)
CHLORIDE SERPL-SCNC: 93 MMOL/L (ref 95–110)
CHOLEST SERPL-MCNC: 217 MG/DL (ref 120–199)
CHOLEST/HDLC SERPL: 2 {RATIO} (ref 2–5)
CO2 SERPL-SCNC: 24 MMOL/L (ref 23–29)
CREAT SERPL-MCNC: 0.9 MG/DL (ref 0.5–1.4)
CREAT UR-MCNC: 438 MG/DL (ref 23–375)
EST. GFR  (NO RACE VARIABLE): >60 ML/MIN/1.73 M^2
ESTIMATED AVG GLUCOSE: 255 MG/DL (ref 68–131)
GLUCOSE SERPL-MCNC: 242 MG/DL (ref 70–110)
HBA1C MFR BLD: 10.5 % (ref 4–5.6)
HDLC SERPL-MCNC: 110 MG/DL (ref 40–75)
HDLC SERPL: 50.7 % (ref 20–50)
LDLC SERPL CALC-MCNC: 91.4 MG/DL (ref 63–159)
MICROALBUMIN UR DL<=1MG/L-MCNC: 88 UG/ML
NONHDLC SERPL-MCNC: 107 MG/DL
POTASSIUM SERPL-SCNC: 5.2 MMOL/L (ref 3.5–5.1)
PROT SERPL-MCNC: 8.1 G/DL (ref 6–8.4)
SODIUM SERPL-SCNC: 130 MMOL/L (ref 136–145)
TRIGL SERPL-MCNC: 78 MG/DL (ref 30–150)

## 2023-04-28 PROCEDURE — 83036 HEMOGLOBIN GLYCOSYLATED A1C: CPT | Performed by: FAMILY MEDICINE

## 2023-04-28 PROCEDURE — 82570 ASSAY OF URINE CREATININE: CPT | Performed by: FAMILY MEDICINE

## 2023-04-28 PROCEDURE — 36415 COLL VENOUS BLD VENIPUNCTURE: CPT | Performed by: FAMILY MEDICINE

## 2023-04-28 PROCEDURE — 80053 COMPREHEN METABOLIC PANEL: CPT | Performed by: FAMILY MEDICINE

## 2023-04-28 PROCEDURE — 80061 LIPID PANEL: CPT | Performed by: FAMILY MEDICINE

## 2023-05-01 DIAGNOSIS — E87.1 HYPONATREMIA: Primary | ICD-10-CM

## 2023-05-01 RX ORDER — FLUTICASONE PROPIONATE 50 MCG
SPRAY, SUSPENSION (ML) NASAL
Qty: 48 ML | Refills: 2 | Status: SHIPPED | OUTPATIENT
Start: 2023-05-01

## 2023-05-01 NOTE — TELEPHONE ENCOUNTER
Refill Decision Note   Saúl Perez  is requesting a refill authorization.  Brief Assessment and Rationale for Refill:  Approve     Medication Therapy Plan:         Comments:     Note composed:5:17 PM 05/01/2023

## 2023-05-01 NOTE — TELEPHONE ENCOUNTER
No care due was identified.  [facility] Embedded Care Due Messages. Reference number: [id number].   5/01/2023 11:19:02 AM CDT

## 2023-05-08 ENCOUNTER — PATIENT MESSAGE (OUTPATIENT)
Dept: INTERNAL MEDICINE | Facility: CLINIC | Age: 53
End: 2023-05-08
Payer: MEDICAID

## 2023-05-09 NOTE — TELEPHONE ENCOUNTER
Spoke with the patient informing him of his lab results and Dr Long recommendation.    MD JASWINDER Duarte Staff  Dm not well controlled   And low sodium (not related to diet)   Need recheck bmp and f/u Gretel review this and diabetes      See the patient results review.

## 2023-05-10 ENCOUNTER — TELEPHONE (OUTPATIENT)
Dept: INTERNAL MEDICINE | Facility: CLINIC | Age: 53
End: 2023-05-10
Payer: MEDICAID

## 2023-05-11 ENCOUNTER — LAB VISIT (OUTPATIENT)
Dept: LAB | Facility: HOSPITAL | Age: 53
End: 2023-05-11
Attending: FAMILY MEDICINE
Payer: MEDICAID

## 2023-05-11 DIAGNOSIS — E87.1 HYPONATREMIA: ICD-10-CM

## 2023-05-11 LAB
ALBUMIN SERPL BCP-MCNC: 3.7 G/DL (ref 3.5–5.2)
ALP SERPL-CCNC: 146 U/L (ref 55–135)
ALT SERPL W/O P-5'-P-CCNC: 93 U/L (ref 10–44)
ANION GAP SERPL CALC-SCNC: 13 MMOL/L (ref 8–16)
AST SERPL-CCNC: 152 U/L (ref 10–40)
BILIRUB SERPL-MCNC: 0.6 MG/DL (ref 0.1–1)
BUN SERPL-MCNC: 6 MG/DL (ref 6–20)
CALCIUM SERPL-MCNC: 9.3 MG/DL (ref 8.7–10.5)
CHLORIDE SERPL-SCNC: 101 MMOL/L (ref 95–110)
CO2 SERPL-SCNC: 25 MMOL/L (ref 23–29)
CREAT SERPL-MCNC: 0.9 MG/DL (ref 0.5–1.4)
EST. GFR  (NO RACE VARIABLE): >60 ML/MIN/1.73 M^2
GLUCOSE SERPL-MCNC: 181 MG/DL (ref 70–110)
POTASSIUM SERPL-SCNC: 4.1 MMOL/L (ref 3.5–5.1)
PROT SERPL-MCNC: 7.4 G/DL (ref 6–8.4)
SODIUM SERPL-SCNC: 139 MMOL/L (ref 136–145)

## 2023-05-11 PROCEDURE — 36415 COLL VENOUS BLD VENIPUNCTURE: CPT | Performed by: FAMILY MEDICINE

## 2023-05-11 PROCEDURE — 80053 COMPREHEN METABOLIC PANEL: CPT | Performed by: FAMILY MEDICINE

## 2023-05-16 ENCOUNTER — TELEPHONE (OUTPATIENT)
Dept: INTERNAL MEDICINE | Facility: CLINIC | Age: 53
End: 2023-05-16
Payer: MEDICAID

## 2023-05-16 NOTE — TELEPHONE ENCOUNTER
----- Message from Leida Hairston sent at 5/16/2023  1:54 PM CDT -----  Regarding: Reschedule Appt  Contact: Patient  Patient is requesting a call back to reschedule appt on 05/16/2023   Attempted to reschedule no appts generated   Please Assist     Patient can be reached at [phone]

## 2023-05-16 NOTE — TELEPHONE ENCOUNTER
I spoke with the patient stated that he could not make his appointment today. The patient asked if there is an opening on 5/18/23 . The patient was scheduled with Gretel on 5/18/23 at 4:00 pm . Patient stated understanding

## 2023-05-23 DIAGNOSIS — E11.9 DIABETES MELLITUS WITHOUT COMPLICATION: ICD-10-CM

## 2023-05-23 RX ORDER — METFORMIN HYDROCHLORIDE 1000 MG/1
TABLET ORAL
Qty: 135 TABLET | Refills: 1 | Status: SHIPPED | OUTPATIENT
Start: 2023-05-23

## 2023-05-23 NOTE — TELEPHONE ENCOUNTER
No care due was identified.  [facility] Embedded Care Due Messages. Reference number: [id number].   5/23/2023 12:13:57 AM CDT

## 2023-05-23 NOTE — TELEPHONE ENCOUNTER
Refill Routing Note   Medication(s) are not appropriate for processing by [facility] for the following reason(s):      Drug-disease interaction    [facility] action(s):  Defer None identified   Medication Therapy Plan: Drug-Disease: metFORMIN and Alcohol abuse; DEFER      Appointments  past 12m or future 3m with PCP    Date Provider   Last Visit   12/15/2022 Juan Daniel Long MD   Next Visit   Visit date not found Juan Daniel Long MD   ED visits in past 90 days: 0        Note composed:5:21 AM 05/23/2023

## 2023-05-25 ENCOUNTER — OFFICE VISIT (OUTPATIENT)
Dept: INTERNAL MEDICINE | Facility: CLINIC | Age: 53
End: 2023-05-25
Payer: MEDICAID

## 2023-05-25 VITALS
WEIGHT: 224.63 LBS | HEART RATE: 115 BPM | BODY MASS INDEX: 30.42 KG/M2 | OXYGEN SATURATION: 98 % | SYSTOLIC BLOOD PRESSURE: 118 MMHG | HEIGHT: 72 IN | TEMPERATURE: 97 F | DIASTOLIC BLOOD PRESSURE: 80 MMHG

## 2023-05-25 DIAGNOSIS — E11.9 TYPE 2 DIABETES MELLITUS WITHOUT COMPLICATION, WITHOUT LONG-TERM CURRENT USE OF INSULIN: ICD-10-CM

## 2023-05-25 DIAGNOSIS — R74.01 TRANSAMINITIS: Primary | ICD-10-CM

## 2023-05-25 DIAGNOSIS — F10.10 ALCOHOL ABUSE: ICD-10-CM

## 2023-05-25 PROCEDURE — 99999 PR PBB SHADOW E&M-EST. PATIENT-LVL V: CPT | Mod: PBBFAC,,, | Performed by: PHYSICIAN ASSISTANT

## 2023-05-25 PROCEDURE — 1160F RVW MEDS BY RX/DR IN RCRD: CPT | Mod: CPTII,,, | Performed by: PHYSICIAN ASSISTANT

## 2023-05-25 PROCEDURE — 3074F PR MOST RECENT SYSTOLIC BLOOD PRESSURE < 130 MM HG: ICD-10-PCS | Mod: CPTII,,, | Performed by: PHYSICIAN ASSISTANT

## 2023-05-25 PROCEDURE — 3046F HEMOGLOBIN A1C LEVEL >9.0%: CPT | Mod: CPTII,,, | Performed by: PHYSICIAN ASSISTANT

## 2023-05-25 PROCEDURE — 1159F MED LIST DOCD IN RCRD: CPT | Mod: CPTII,,, | Performed by: PHYSICIAN ASSISTANT

## 2023-05-25 PROCEDURE — 3046F PR MOST RECENT HEMOGLOBIN A1C LEVEL > 9.0%: ICD-10-PCS | Mod: CPTII,,, | Performed by: PHYSICIAN ASSISTANT

## 2023-05-25 PROCEDURE — 3061F PR NEG MICROALBUMINURIA RESULT DOCUMENTED/REVIEW: ICD-10-PCS | Mod: CPTII,,, | Performed by: PHYSICIAN ASSISTANT

## 2023-05-25 PROCEDURE — 99214 OFFICE O/P EST MOD 30 MIN: CPT | Mod: S$PBB,,, | Performed by: PHYSICIAN ASSISTANT

## 2023-05-25 PROCEDURE — 4010F ACE/ARB THERAPY RXD/TAKEN: CPT | Mod: CPTII,,, | Performed by: PHYSICIAN ASSISTANT

## 2023-05-25 PROCEDURE — 3072F LOW RISK FOR RETINOPATHY: CPT | Mod: CPTII,,, | Performed by: PHYSICIAN ASSISTANT

## 2023-05-25 PROCEDURE — 99214 PR OFFICE/OUTPT VISIT, EST, LEVL IV, 30-39 MIN: ICD-10-PCS | Mod: S$PBB,,, | Performed by: PHYSICIAN ASSISTANT

## 2023-05-25 PROCEDURE — 3008F PR BODY MASS INDEX (BMI) DOCUMENTED: ICD-10-PCS | Mod: CPTII,,, | Performed by: PHYSICIAN ASSISTANT

## 2023-05-25 PROCEDURE — 3072F PR LOW RISK FOR RETINOPATHY: ICD-10-PCS | Mod: CPTII,,, | Performed by: PHYSICIAN ASSISTANT

## 2023-05-25 PROCEDURE — 1160F PR REVIEW ALL MEDS BY PRESCRIBER/CLIN PHARMACIST DOCUMENTED: ICD-10-PCS | Mod: CPTII,,, | Performed by: PHYSICIAN ASSISTANT

## 2023-05-25 PROCEDURE — 3074F SYST BP LT 130 MM HG: CPT | Mod: CPTII,,, | Performed by: PHYSICIAN ASSISTANT

## 2023-05-25 PROCEDURE — 3008F BODY MASS INDEX DOCD: CPT | Mod: CPTII,,, | Performed by: PHYSICIAN ASSISTANT

## 2023-05-25 PROCEDURE — 3079F DIAST BP 80-89 MM HG: CPT | Mod: CPTII,,, | Performed by: PHYSICIAN ASSISTANT

## 2023-05-25 PROCEDURE — 99999 PR PBB SHADOW E&M-EST. PATIENT-LVL V: ICD-10-PCS | Mod: PBBFAC,,, | Performed by: PHYSICIAN ASSISTANT

## 2023-05-25 PROCEDURE — 3066F NEPHROPATHY DOC TX: CPT | Mod: CPTII,,, | Performed by: PHYSICIAN ASSISTANT

## 2023-05-25 PROCEDURE — 3079F PR MOST RECENT DIASTOLIC BLOOD PRESSURE 80-89 MM HG: ICD-10-PCS | Mod: CPTII,,, | Performed by: PHYSICIAN ASSISTANT

## 2023-05-25 PROCEDURE — 3066F PR DOCUMENTATION OF TREATMENT FOR NEPHROPATHY: ICD-10-PCS | Mod: CPTII,,, | Performed by: PHYSICIAN ASSISTANT

## 2023-05-25 PROCEDURE — 1159F PR MEDICATION LIST DOCUMENTED IN MEDICAL RECORD: ICD-10-PCS | Mod: CPTII,,, | Performed by: PHYSICIAN ASSISTANT

## 2023-05-25 PROCEDURE — 4010F PR ACE/ARB THEARPY RXD/TAKEN: ICD-10-PCS | Mod: CPTII,,, | Performed by: PHYSICIAN ASSISTANT

## 2023-05-25 PROCEDURE — 3061F NEG MICROALBUMINURIA REV: CPT | Mod: CPTII,,, | Performed by: PHYSICIAN ASSISTANT

## 2023-05-25 PROCEDURE — 99215 OFFICE O/P EST HI 40 MIN: CPT | Mod: PBBFAC | Performed by: PHYSICIAN ASSISTANT

## 2023-05-25 RX ORDER — NALTREXONE HYDROCHLORIDE 50 MG/1
50 TABLET, FILM COATED ORAL DAILY
Qty: 30 TABLET | Refills: 1 | Status: SHIPPED | OUTPATIENT
Start: 2023-05-25 | End: 2023-06-22

## 2023-05-25 NOTE — PROGRESS NOTES
"  Subjective:      Patient ID: Saúl Perez is a 53 y.o. male.    Chief Complaint: Follow-up    HPI  Here today for a follow up to review his recent labs.   Scheduled to see hepatology in October.   Ultrasound abd scheduled for next week.   Stopped crestor for now.  Stopped etoh yesterday. Had nausea/vomiting dry heaving. Today feels better.   Diabetes stable and controlled.       Patient Active Problem List   Diagnosis    Anxiety and depression    Encounter for screening for malignant neoplasm of prostate    Screening for malignant neoplasm of colon    Elevated triglycerides with high cholesterol    Moderate episode of recurrent major depressive disorder    Moderate hypertension    Encounter for long-term (current) use of medications    GERD without esophagitis    History of alcohol abuse    Diabetes mellitus with coincident hypertension    Type 2 diabetes mellitus    Elevated troponin    Post-traumatic osteoarthritis of left knee    Alcohol abuse    Malignant essential hypertension    Suicidal behavior    Contusion of periorbital region    Subconjunctival hemorrhage         Current Outpatient Medications:     albuterol (PROVENTIL HFA) 90 mcg/actuation inhaler, Inhale 2 puffs into the lungs every 6 (six) hours as needed for Wheezing. Rescue, Disp: 18 g, Rfl: 0    aspirin (ECOTRIN) 81 MG EC tablet, aspirin 81 mg tablet,delayed release, Disp: , Rfl:     BD NAS 2ND GEN PEN NEEDLE 32 gauge x 5/32" Ndle, USE ONCE DAILY AT BEDTIME, Disp: , Rfl:     cyanocobalamin 500 MCG tablet, Take 500 mcg by mouth once daily., Disp: , Rfl:     doxycycline (VIBRA-TABS) 100 MG tablet, Take 1 po qday with food and not within 1 hour prior to lying down, Disp: 30 tablet, Rfl: 2    fluticasone propionate (FLONASE) 50 mcg/actuation nasal spray, INSTILL 2 SPRAYS IN EACH NOSTRIL DAILY, Disp: 48 mL, Rfl: 2    folic acid (FOLVITE) 1 MG tablet, Take 1,000 mcg by mouth once daily., Disp: , Rfl:     hydrOXYzine pamoate (VISTARIL) 25 MG Cap, " Take 25 mg by mouth 4 (four) times daily as needed., Disp: , Rfl:     ipratropium (ATROVENT) 21 mcg (0.03 %) nasal spray, 2 sprays by Nasal route 2 (two) times daily., Disp: 30 mL, Rfl: 0    lancets Misc, To check BG 1 times daily, to use with insurance preferred meter, Disp: 50 each, Rfl: 11    levocetirizine (XYZAL) 5 MG tablet, Take 1 tablet (5 mg total) by mouth every evening., Disp: 30 tablet, Rfl: 11    lisinopriL (PRINIVIL,ZESTRIL) 20 MG tablet, Take 1 tablet (20 mg total) by mouth every evening., Disp: 90 tablet, Rfl: 3    lisinopriL (PRINIVIL,ZESTRIL) 40 MG tablet, TAKE 1 TABLET BY MOUTH EVERY DAY, Disp: 90 tablet, Rfl: 5    magnesium oxide (MAG-OX) 400 mg (241.3 mg magnesium) tablet, Take 1 tablet by mouth once daily., Disp: , Rfl:     meloxicam (MOBIC) 15 MG tablet, TAKE 1 TABLET(15 MG) BY MOUTH DAILY AS NEEDED FOR PAIN, Disp: 30 tablet, Rfl: 3    metFORMIN (GLUCOPHAGE) 1000 MG tablet, TAKE 1 TABLET BY MOUTH EVERY MORNING AND TAKE 1/2 A TABLET BY MOUTH EVERY EVENING, Disp: 135 tablet, Rfl: 1    methocarbamoL (ROBAXIN) 500 MG Tab, , Disp: , Rfl:     multivitamin (THERAGRAN) per tablet, Take 1 tablet by mouth once daily., Disp: , Rfl:     naltrexone (DEPADE) 50 mg tablet, Take 50 mg by mouth once daily., Disp: 30 tablet, Rfl: 1    omega-3 acid ethyl esters (LOVAZA) 1 gram capsule, Take 2 capsules (2 g total) by mouth 2 (two) times daily., Disp: 360 capsule, Rfl: 4    omeprazole (PRILOSEC) 40 MG capsule, Take 1 capsule (40 mg total) by mouth once daily., Disp: 90 capsule, Rfl: 3    ONETOUCH ULTRA2 METER Misc, 2 (two) times daily., Disp: , Rfl:     triamcinolone acetonide 0.1% (KENALOG) 0.1 % cream, Apply topically 2 (two) times daily., Disp: 80 g, Rfl: 0    TRULICITY 3 mg/0.5 mL pen injector, SMARTSI SUB-Q Once a Week, Disp: , Rfl:     vitamin D (VITAMIN D3) 1000 units Tab, Take 1,000 Units by mouth once daily., Disp: , Rfl:     Review of Systems  Objective:   /80 (BP Location: Left arm, Patient  Position: Sitting, BP Method: Medium (Manual))   Pulse (!) 115   Temp 97.2 °F (36.2 °C) (Tympanic)   Ht 6' (1.829 m)   Wt 101.9 kg (224 lb 10.4 oz)   SpO2 98%   BMI 30.47 kg/m²     Physical Exam  Lab Visit on 05/11/2023   Component Date Value Ref Range Status    Sodium 05/11/2023 139  136 - 145 mmol/L Final    Potassium 05/11/2023 4.1  3.5 - 5.1 mmol/L Final    Chloride 05/11/2023 101  95 - 110 mmol/L Final    CO2 05/11/2023 25  23 - 29 mmol/L Final    Glucose 05/11/2023 181 (H)  70 - 110 mg/dL Final    BUN 05/11/2023 6  6 - 20 mg/dL Final    Creatinine 05/11/2023 0.9  0.5 - 1.4 mg/dL Final    Calcium 05/11/2023 9.3  8.7 - 10.5 mg/dL Final    Total Protein 05/11/2023 7.4  6.0 - 8.4 g/dL Final    Albumin 05/11/2023 3.7  3.5 - 5.2 g/dL Final    Total Bilirubin 05/11/2023 0.6  0.1 - 1.0 mg/dL Final    Comment: For infants and newborns, interpretation of results should be based  on gestational age, weight and in agreement with clinical  observations.    Premature Infant recommended reference ranges:  Up to 24 hours.............<8.0 mg/dL  Up to 48 hours............<12.0 mg/dL  3-5 days..................<15.0 mg/dL  6-29 days.................<15.0 mg/dL      Alkaline Phosphatase 05/11/2023 146 (H)  55 - 135 U/L Final    AST 05/11/2023 152 (H)  10 - 40 U/L Final    ALT 05/11/2023 93 (H)  10 - 44 U/L Final    Anion Gap 05/11/2023 13  8 - 16 mmol/L Final    eGFR 05/11/2023 >60.0  >60 mL/min/1.73 m^2 Final   Lab Visit on 04/28/2023   Component Date Value Ref Range Status    Hemoglobin A1C 04/28/2023 10.5 (H)  4.0 - 5.6 % Final    Comment: ADA Screening Guidelines:  5.7-6.4%  Consistent with prediabetes  >or=6.5%  Consistent with diabetes    High levels of fetal hemoglobin interfere with the HbA1C  assay. Heterozygous hemoglobin variants (HbS, HgC, etc)do  not significantly interfere with this assay.   However, presence of multiple variants may affect accuracy.      Estimated Avg Glucose 04/28/2023 255 (H)  68 - 131  mg/dL Final    Sodium 04/28/2023 130 (L)  136 - 145 mmol/L Final    Potassium 04/28/2023 5.2 (H)  3.5 - 5.1 mmol/L Final    Chloride 04/28/2023 93 (L)  95 - 110 mmol/L Final    CO2 04/28/2023 24  23 - 29 mmol/L Final    Glucose 04/28/2023 242 (H)  70 - 110 mg/dL Final    BUN 04/28/2023 11  6 - 20 mg/dL Final    Creatinine 04/28/2023 0.9  0.5 - 1.4 mg/dL Final    Calcium 04/28/2023 9.7  8.7 - 10.5 mg/dL Final    Total Protein 04/28/2023 8.1  6.0 - 8.4 g/dL Final    Albumin 04/28/2023 3.9  3.5 - 5.2 g/dL Final    Total Bilirubin 04/28/2023 1.5 (H)  0.1 - 1.0 mg/dL Final    Comment: For infants and newborns, interpretation of results should be based  on gestational age, weight and in agreement with clinical  observations.    Premature Infant recommended reference ranges:  Up to 24 hours.............<8.0 mg/dL  Up to 48 hours............<12.0 mg/dL  3-5 days..................<15.0 mg/dL  6-29 days.................<15.0 mg/dL      Alkaline Phosphatase 04/28/2023 130  55 - 135 U/L Final    AST 04/28/2023 98 (H)  10 - 40 U/L Final    ALT 04/28/2023 85 (H)  10 - 44 U/L Final    Anion Gap 04/28/2023 13  8 - 16 mmol/L Final    eGFR 04/28/2023 >60.0  >60 mL/min/1.73 m^2 Final    Microalbumin, Urine 04/28/2023 88.0  ug/mL Final    Creatinine, Urine 04/28/2023 438.0 (H)  23.0 - 375.0 mg/dL Final    Microalb/Creat Ratio 04/28/2023 20.1  0.0 - 30.0 ug/mg Final    Cholesterol 04/28/2023 217 (H)  120 - 199 mg/dL Final    Comment: The National Cholesterol Education Program (NCEP) has set the  following guidelines (reference ranges) for Cholesterol:  Optimal.....................<200 mg/dL  Borderline High.............200-239 mg/dL  High........................> or = 240 mg/dL      Triglycerides 04/28/2023 78  30 - 150 mg/dL Final    Comment: The National Cholesterol Education Program (NCEP) has set the  following guidelines (reference values) for triglycerides:  Normal......................<150 mg/dL  Borderline  High.............150-199 mg/dL  High........................200-499 mg/dL      HDL 04/28/2023 110 (H)  40 - 75 mg/dL Final    Comment: The National Cholesterol Education Program (NCEP) has set the  following guidelines (reference values) for HDL Cholesterol:  Low...............<40 mg/dL  Optimal...........>60 mg/dL      LDL Cholesterol 04/28/2023 91.4  63.0 - 159.0 mg/dL Final    Comment: The National Cholesterol Education Program (NCEP) has set the  following guidelines (reference values) for LDL Cholesterol:  Optimal.......................<130 mg/dL  Borderline High...............130-159 mg/dL  High..........................160-189 mg/dL  Very High.....................>190 mg/dL      HDL/Cholesterol Ratio 04/28/2023 50.7 (H)  20.0 - 50.0 % Final    Total Cholesterol/HDL Ratio 04/28/2023 2.0  2.0 - 5.0 Final    Non-HDL Cholesterol 04/28/2023 107  mg/dL Final    Comment: Risk category and Non-HDL cholesterol goals:  Coronary heart disease (CHD)or equivalent (10-year risk of CHD >20%):  Non-HDL cholesterol goal     <130 mg/dL  Two or more CHD risk factors and 10-year risk of CHD <= 20%:  Non-HDL cholesterol goal     <160 mg/dL  0 to 1 CHD risk factor:  Non-HDL cholesterol goal     <190 mg/dL         Assessment:     1. Transaminitis    2. Type 2 diabetes mellitus without complication, without long-term current use of insulin    3. Alcohol abuse      Plan:   Transaminitis  -     HEPATIC FUNCTION PANEL; Future; Expected date: 06/08/2023  -see hepatology as scheduled  -ultrasound scheduled for next week  -recheck enzymes in 1 month with follow up after    Type 2 diabetes mellitus without complication, without long-term current use of insulin    Alcohol abuse  -     naltrexone (DEPADE) 50 mg tablet; Take 50 mg by mouth once daily.  Dispense: 30 tablet; Refill: 1    -restart crestor once liver enzymes.   -get back with AA.   -discussed option of rehab \treatment center. Pt will consider.   -try naltrexone to help with  alcohol cessation    Follow up in about 4 weeks (around 6/22/2023), or if symptoms worsen or fail to improve.

## 2023-06-05 ENCOUNTER — TELEPHONE (OUTPATIENT)
Dept: INTERNAL MEDICINE | Facility: CLINIC | Age: 53
End: 2023-06-05
Payer: MEDICAID

## 2023-06-05 ENCOUNTER — HOSPITAL ENCOUNTER (OUTPATIENT)
Dept: RADIOLOGY | Facility: HOSPITAL | Age: 53
Discharge: HOME | End: 2023-06-05
Attending: FAMILY MEDICINE
Payer: MEDICAID

## 2023-06-05 DIAGNOSIS — R79.89 ELEVATED LFTS: ICD-10-CM

## 2023-06-05 PROCEDURE — 76705 US ABDOMEN LIMITED: ICD-10-PCS | Mod: 26,,, | Performed by: STUDENT IN AN ORGANIZED HEALTH CARE EDUCATION/TRAINING PROGRAM

## 2023-06-05 PROCEDURE — 76705 ECHO EXAM OF ABDOMEN: CPT | Mod: 26,,, | Performed by: STUDENT IN AN ORGANIZED HEALTH CARE EDUCATION/TRAINING PROGRAM

## 2023-06-05 PROCEDURE — 76705 ECHO EXAM OF ABDOMEN: CPT | Mod: TC

## 2023-06-05 NOTE — TELEPHONE ENCOUNTER
I spoke with the patient stated that he is calling for his ultrasound results. The patient was informed that his message will be sent to Dr Long.

## 2023-06-05 NOTE — TELEPHONE ENCOUNTER
----- Message from Tanja Becerra sent at 6/5/2023  3:17 PM CDT -----  Contact: Saúl  Type:  Test Results    Who Called: Saúl  Name of Test (Lab/Mammo/Etc): ultrasound  Date of Test: 06/05  Ordering Provider: Dr. Long  Where the test was performed[facility]  Would the patient rather a call back or a response via [facility]? Call back  Best Call Back Number: [phone]  Additional Information:      Thanks  LJ

## 2023-06-09 NOTE — TELEPHONE ENCOUNTER
I spoke with the patient informing him what Dr Long stated about his ultrasound.     U/s liver shows increased liver size with increased fat content. Common but not normal. Ok to further evaluate as planned in October with liver clinic       The patient is also on the waiting list for hepatology.    The patient stated understanding all the information given to him.     The patient was informed that if he need anything to give the clinic a call.

## 2023-06-11 DIAGNOSIS — L30.9 ECZEMA, UNSPECIFIED TYPE: ICD-10-CM

## 2023-06-13 RX ORDER — TRIAMCINOLONE ACETONIDE 1 MG/G
CREAM TOPICAL
Qty: 80 G | Refills: 0 | Status: SHIPPED | OUTPATIENT
Start: 2023-06-13

## 2023-06-15 ENCOUNTER — PATIENT OUTREACH (OUTPATIENT)
Dept: ADMINISTRATIVE | Facility: HOSPITAL | Age: 53
End: 2023-06-15
Payer: MEDICAID

## 2023-06-15 ENCOUNTER — LAB VISIT (OUTPATIENT)
Dept: LAB | Facility: HOSPITAL | Age: 53
End: 2023-06-15
Attending: PHYSICIAN ASSISTANT
Payer: MEDICAID

## 2023-06-15 DIAGNOSIS — R74.01 TRANSAMINITIS: ICD-10-CM

## 2023-06-15 LAB
ALBUMIN SERPL BCP-MCNC: 3.5 G/DL (ref 3.5–5.2)
ALP SERPL-CCNC: 132 U/L (ref 55–135)
ALT SERPL W/O P-5'-P-CCNC: 86 U/L (ref 10–44)
AST SERPL-CCNC: 115 U/L (ref 10–40)
BILIRUB DIRECT SERPL-MCNC: 0.9 MG/DL (ref 0.1–0.3)
BILIRUB SERPL-MCNC: 1 MG/DL (ref 0.1–1)
PROT SERPL-MCNC: 7.4 G/DL (ref 6–8.4)

## 2023-06-15 PROCEDURE — 80076 HEPATIC FUNCTION PANEL: CPT | Performed by: PHYSICIAN ASSISTANT

## 2023-06-15 PROCEDURE — 36415 COLL VENOUS BLD VENIPUNCTURE: CPT | Performed by: PHYSICIAN ASSISTANT

## 2023-06-15 NOTE — PROGRESS NOTES
Working non-compliant Statin Report: Patient is currently not on any statin due to recently being DC'd per chart, chart routed with instructions to PCP to review need for statin or to update Problem List on next visit. Has upcoming appt with [facility], Reminder set to f/u.

## 2023-06-15 NOTE — Clinical Note
[employer] is suggesting a high/moderate intensity statin. Please order if agreeable. If you order statin, please ensure your staff calls the patient to notify and educate patient on medication & where to . If patient cannot take statin medications due to allergy or intolerance you must drop that code every year in a visit. G72.0 Drug induced myopathy G72.2 Myopathy due to other toxic agent G72.9 Myopathy, unspecified M62.82 Rhabdomyolysis M60.80 Other myositis unspecified M79.1 Myalgia Elevated LFT (enzymes)  T.46.6 adverse reaction to statin (Elevated LFT's) K71.9 Hepatotoxicity due to statin (Elevated LFT's) For patients refusing statin or non-compliant  Z53.29 Statin medication declined by patient   Z53.8    Statin medication declined by patient's family Z91.14 Patient noncompliant with statin medication  Z53.29 Refusal of statin medication by patient Z53.8    Refusal of statin medication by patient's family

## 2023-06-22 ENCOUNTER — OFFICE VISIT (OUTPATIENT)
Dept: INTERNAL MEDICINE | Facility: CLINIC | Age: 53
End: 2023-06-22
Payer: MEDICAID

## 2023-06-22 VITALS
TEMPERATURE: 97 F | HEART RATE: 103 BPM | HEIGHT: 72 IN | OXYGEN SATURATION: 96 % | DIASTOLIC BLOOD PRESSURE: 78 MMHG | WEIGHT: 226.63 LBS | BODY MASS INDEX: 30.7 KG/M2 | SYSTOLIC BLOOD PRESSURE: 118 MMHG

## 2023-06-22 DIAGNOSIS — I10 DIABETES MELLITUS WITH COINCIDENT HYPERTENSION: ICD-10-CM

## 2023-06-22 DIAGNOSIS — F10.11 HISTORY OF ALCOHOL ABUSE: ICD-10-CM

## 2023-06-22 DIAGNOSIS — Z12.5 SCREENING FOR PROSTATE CANCER: ICD-10-CM

## 2023-06-22 DIAGNOSIS — E11.9 TYPE 2 DIABETES MELLITUS WITHOUT COMPLICATION, WITHOUT LONG-TERM CURRENT USE OF INSULIN: Primary | ICD-10-CM

## 2023-06-22 DIAGNOSIS — E11.9 DIABETES MELLITUS WITH COINCIDENT HYPERTENSION: ICD-10-CM

## 2023-06-22 DIAGNOSIS — I10 MODERATE HYPERTENSION: ICD-10-CM

## 2023-06-22 PROCEDURE — 3066F PR DOCUMENTATION OF TREATMENT FOR NEPHROPATHY: ICD-10-PCS | Mod: CPTII,,, | Performed by: PHYSICIAN ASSISTANT

## 2023-06-22 PROCEDURE — 99999 PR PBB SHADOW E&M-EST. PATIENT-LVL V: ICD-10-PCS | Mod: PBBFAC,,, | Performed by: PHYSICIAN ASSISTANT

## 2023-06-22 PROCEDURE — 3066F NEPHROPATHY DOC TX: CPT | Mod: CPTII,,, | Performed by: PHYSICIAN ASSISTANT

## 2023-06-22 PROCEDURE — 99213 OFFICE O/P EST LOW 20 MIN: CPT | Mod: S$PBB,,, | Performed by: PHYSICIAN ASSISTANT

## 2023-06-22 PROCEDURE — 3072F LOW RISK FOR RETINOPATHY: CPT | Mod: CPTII,,, | Performed by: PHYSICIAN ASSISTANT

## 2023-06-22 PROCEDURE — 3008F PR BODY MASS INDEX (BMI) DOCUMENTED: ICD-10-PCS | Mod: CPTII,,, | Performed by: PHYSICIAN ASSISTANT

## 2023-06-22 PROCEDURE — 3078F PR MOST RECENT DIASTOLIC BLOOD PRESSURE < 80 MM HG: ICD-10-PCS | Mod: CPTII,,, | Performed by: PHYSICIAN ASSISTANT

## 2023-06-22 PROCEDURE — 4010F ACE/ARB THERAPY RXD/TAKEN: CPT | Mod: CPTII,,, | Performed by: PHYSICIAN ASSISTANT

## 2023-06-22 PROCEDURE — 3046F PR MOST RECENT HEMOGLOBIN A1C LEVEL > 9.0%: ICD-10-PCS | Mod: CPTII,,, | Performed by: PHYSICIAN ASSISTANT

## 2023-06-22 PROCEDURE — 1159F PR MEDICATION LIST DOCUMENTED IN MEDICAL RECORD: ICD-10-PCS | Mod: CPTII,,, | Performed by: PHYSICIAN ASSISTANT

## 2023-06-22 PROCEDURE — 3074F SYST BP LT 130 MM HG: CPT | Mod: CPTII,,, | Performed by: PHYSICIAN ASSISTANT

## 2023-06-22 PROCEDURE — 3072F PR LOW RISK FOR RETINOPATHY: ICD-10-PCS | Mod: CPTII,,, | Performed by: PHYSICIAN ASSISTANT

## 2023-06-22 PROCEDURE — 3061F PR NEG MICROALBUMINURIA RESULT DOCUMENTED/REVIEW: ICD-10-PCS | Mod: CPTII,,, | Performed by: PHYSICIAN ASSISTANT

## 2023-06-22 PROCEDURE — 3074F PR MOST RECENT SYSTOLIC BLOOD PRESSURE < 130 MM HG: ICD-10-PCS | Mod: CPTII,,, | Performed by: PHYSICIAN ASSISTANT

## 2023-06-22 PROCEDURE — 99213 PR OFFICE/OUTPT VISIT, EST, LEVL III, 20-29 MIN: ICD-10-PCS | Mod: S$PBB,,, | Performed by: PHYSICIAN ASSISTANT

## 2023-06-22 PROCEDURE — 99215 OFFICE O/P EST HI 40 MIN: CPT | Mod: PBBFAC | Performed by: PHYSICIAN ASSISTANT

## 2023-06-22 PROCEDURE — 3061F NEG MICROALBUMINURIA REV: CPT | Mod: CPTII,,, | Performed by: PHYSICIAN ASSISTANT

## 2023-06-22 PROCEDURE — 1160F PR REVIEW ALL MEDS BY PRESCRIBER/CLIN PHARMACIST DOCUMENTED: ICD-10-PCS | Mod: CPTII,,, | Performed by: PHYSICIAN ASSISTANT

## 2023-06-22 PROCEDURE — 1160F RVW MEDS BY RX/DR IN RCRD: CPT | Mod: CPTII,,, | Performed by: PHYSICIAN ASSISTANT

## 2023-06-22 PROCEDURE — 3078F DIAST BP <80 MM HG: CPT | Mod: CPTII,,, | Performed by: PHYSICIAN ASSISTANT

## 2023-06-22 PROCEDURE — 4010F PR ACE/ARB THEARPY RXD/TAKEN: ICD-10-PCS | Mod: CPTII,,, | Performed by: PHYSICIAN ASSISTANT

## 2023-06-22 PROCEDURE — 1159F MED LIST DOCD IN RCRD: CPT | Mod: CPTII,,, | Performed by: PHYSICIAN ASSISTANT

## 2023-06-22 PROCEDURE — 99999 PR PBB SHADOW E&M-EST. PATIENT-LVL V: CPT | Mod: PBBFAC,,, | Performed by: PHYSICIAN ASSISTANT

## 2023-06-22 PROCEDURE — 3046F HEMOGLOBIN A1C LEVEL >9.0%: CPT | Mod: CPTII,,, | Performed by: PHYSICIAN ASSISTANT

## 2023-06-22 PROCEDURE — 3008F BODY MASS INDEX DOCD: CPT | Mod: CPTII,,, | Performed by: PHYSICIAN ASSISTANT

## 2023-06-22 NOTE — PROGRESS NOTES
"  Subjective:      Patient ID: Saúl Perez is a 53 y.o. male.    Chief Complaint: Follow-up    HPI  Here today for a follow up after starting on naltrexone to assist with alcohol cessation.   Intolerant to naltrexone so stopped taking. Made him feel hung over. He was able to ween himself off etoh. He has been without alcohol completely for 10 days.   Had his eye exam and everything was normal.     Patient Active Problem List   Diagnosis    Anxiety and depression    Encounter for screening for malignant neoplasm of prostate    Screening for malignant neoplasm of colon    Elevated triglycerides with high cholesterol    Moderate episode of recurrent major depressive disorder    Moderate hypertension    Encounter for long-term (current) use of medications    GERD without esophagitis    History of alcohol abuse    Diabetes mellitus with coincident hypertension    Type 2 diabetes mellitus    Elevated troponin    Post-traumatic osteoarthritis of left knee    Alcohol abuse    Malignant essential hypertension    Suicidal behavior    Contusion of periorbital region    Subconjunctival hemorrhage         Current Outpatient Medications:     albuterol (PROVENTIL HFA) 90 mcg/actuation inhaler, Inhale 2 puffs into the lungs every 6 (six) hours as needed for Wheezing. Rescue, Disp: 18 g, Rfl: 0    aspirin (ECOTRIN) 81 MG EC tablet, aspirin 81 mg tablet,delayed release, Disp: , Rfl:     BD NAS 2ND GEN PEN NEEDLE 32 gauge x 5/32" Ndle, USE ONCE DAILY AT BEDTIME, Disp: , Rfl:     cyanocobalamin 500 MCG tablet, Take 500 mcg by mouth once daily., Disp: , Rfl:     doxycycline (VIBRA-TABS) 100 MG tablet, Take 1 po qday with food and not within 1 hour prior to lying down, Disp: 30 tablet, Rfl: 2    fluticasone propionate (FLONASE) 50 mcg/actuation nasal spray, INSTILL 2 SPRAYS IN EACH NOSTRIL DAILY, Disp: 48 mL, Rfl: 2    folic acid (FOLVITE) 1 MG tablet, Take 1,000 mcg by mouth once daily., Disp: , Rfl:     ipratropium (ATROVENT) 21 " mcg (0.03 %) nasal spray, 2 sprays by Nasal route 2 (two) times daily., Disp: 30 mL, Rfl: 0    lancets Misc, To check BG 1 times daily, to use with insurance preferred meter, Disp: 50 each, Rfl: 11    levocetirizine (XYZAL) 5 MG tablet, Take 1 tablet (5 mg total) by mouth every evening., Disp: 30 tablet, Rfl: 11    lisinopriL (PRINIVIL,ZESTRIL) 20 MG tablet, Take 1 tablet (20 mg total) by mouth every evening., Disp: 90 tablet, Rfl: 3    lisinopriL (PRINIVIL,ZESTRIL) 40 MG tablet, TAKE 1 TABLET BY MOUTH EVERY DAY, Disp: 90 tablet, Rfl: 5    magnesium oxide (MAG-OX) 400 mg (241.3 mg magnesium) tablet, Take 1 tablet by mouth once daily., Disp: , Rfl:     meloxicam (MOBIC) 15 MG tablet, TAKE 1 TABLET(15 MG) BY MOUTH DAILY AS NEEDED FOR PAIN, Disp: 30 tablet, Rfl: 3    metFORMIN (GLUCOPHAGE) 1000 MG tablet, TAKE 1 TABLET BY MOUTH EVERY MORNING AND TAKE 1/2 A TABLET BY MOUTH EVERY EVENING, Disp: 135 tablet, Rfl: 1    methocarbamoL (ROBAXIN) 500 MG Tab, , Disp: , Rfl:     multivitamin (THERAGRAN) per tablet, Take 1 tablet by mouth once daily., Disp: , Rfl:     omega-3 acid ethyl esters (LOVAZA) 1 gram capsule, Take 2 capsules (2 g total) by mouth 2 (two) times daily., Disp: 360 capsule, Rfl: 4    omeprazole (PRILOSEC) 40 MG capsule, Take 1 capsule (40 mg total) by mouth once daily., Disp: 90 capsule, Rfl: 3    ONETOUCH ULTRA2 METER Misc, 2 (two) times daily., Disp: , Rfl:     triamcinolone acetonide 0.1% (KENALOG) 0.1 % cream, APPLY TOPICALLY TWICE A DAY, Disp: 80 g, Rfl: 0    TRULICITY 3 mg/0.5 mL pen injector, SMARTSI SUB-Q Once a Week, Disp: , Rfl:     vitamin D (VITAMIN D3) 1000 units Tab, Take 1,000 Units by mouth once daily., Disp: , Rfl:     Review of Systems   Constitutional:  Negative for activity change, appetite change, chills, diaphoresis, fatigue, fever and unexpected weight change.   HENT: Negative.  Negative for congestion, hearing loss, postnasal drip, rhinorrhea, sore throat, trouble swallowing and  voice change.    Eyes: Negative.  Negative for visual disturbance.   Respiratory: Negative.  Negative for cough, choking, chest tightness and shortness of breath.    Cardiovascular:  Negative for chest pain, palpitations and leg swelling.   Gastrointestinal:  Negative for abdominal distention, abdominal pain, blood in stool, constipation, diarrhea, nausea and vomiting.   Endocrine: Negative for cold intolerance, heat intolerance, polydipsia and polyuria.   Genitourinary: Negative.  Negative for difficulty urinating and frequency.   Musculoskeletal:  Negative for arthralgias, back pain, gait problem, joint swelling and myalgias.   Skin:  Negative for color change, pallor, rash and wound.   Neurological:  Negative for dizziness, tremors, weakness, light-headedness, numbness and headaches.   Hematological:  Negative for adenopathy.   Psychiatric/Behavioral:  Negative for behavioral problems, confusion, self-injury, sleep disturbance and suicidal ideas. The patient is not nervous/anxious.    Objective:   /78 (BP Location: Right arm, Patient Position: Sitting, BP Method: Medium (Manual))   Pulse 103   Temp 96.8 °F (36 °C) (Tympanic)   Ht 6' (1.829 m)   Wt 102.8 kg (226 lb 10.1 oz)   SpO2 96%   BMI 30.74 kg/m²     Physical Exam  Vitals and nursing note reviewed.   Constitutional:       General: He is not in acute distress.     Appearance: Normal appearance. He is well-developed. He is not ill-appearing, toxic-appearing or diaphoretic.   HENT:      Head: Normocephalic and atraumatic.   Cardiovascular:      Rate and Rhythm: Normal rate and regular rhythm.      Heart sounds: Normal heart sounds. No murmur heard.    No friction rub. No gallop.   Pulmonary:      Effort: Pulmonary effort is normal. No respiratory distress.      Breath sounds: Normal breath sounds. No wheezing or rales.   Skin:     General: Skin is warm.      Findings: No rash.   Neurological:      Mental Status: He is alert and oriented to person,  place, and time.   Psychiatric:         Mood and Affect: Mood normal.         Behavior: Behavior normal.         Thought Content: Thought content normal.         Judgment: Judgment normal.       Assessment:     1. Type 2 diabetes mellitus without complication, without long-term current use of insulin    2. Diabetes mellitus with coincident hypertension    3. Screening for prostate cancer    4. History of alcohol abuse    5. Moderate hypertension      Plan:   Type 2 diabetes mellitus without complication, without long-term current use of insulin  -     Hemoglobin A1C; Future; Expected date: 07/22/2023  -     Comprehensive Metabolic Panel; Future; Expected date: 07/22/2023    Diabetes mellitus with coincident hypertension    Screening for prostate cancer  -     PSA, Screening; Future; Expected date: 06/22/2023    History of alcohol abuse    Moderate hypertension    -continue good work with etoh cessation.   -recheck labs in 1 month  -bp stable  -will discuss adding low dose statin at follow up visit next month    Follow up in about 4 weeks (around 7/20/2023), or if symptoms worsen or fail to improve.

## 2023-07-12 RX ORDER — ROSUVASTATIN CALCIUM 5 MG/1
TABLET, COATED ORAL
Qty: 90 TABLET | Refills: 4 | OUTPATIENT
Start: 2023-07-12

## 2023-07-12 NOTE — TELEPHONE ENCOUNTER
Refill Routing Note   Medication(s) are not appropriate for processing by [facility] for the following reason(s):      Drug-disease interaction: Transaminitis    [facility] action(s):  Defer  Quick Discontinue Care Due:  None identified   Medication Therapy Plan: LCO- patient taking 40mg daily  and 20mg in the evening (12/29/22); Rosuvastatin dc'd (5/19/23)    Pharmacist review requested: Yes     Appointments  past 12m or future 3m with PCP    Date Provider   Last Visit   12/15/2022 Juan Daniel Long MD   Next Visit   Visit date not found Juan Daniel Long MD   ED visits in past 90 days: 0        Note composed:2:39 PM 07/12/2023

## 2023-07-12 NOTE — TELEPHONE ENCOUNTER
Refill Routing Note   Medication(s) are not appropriate for processing by [facility] for the following reason(s):      Drug-disease interaction    [facility] action(s):  Defer  Quick Discontinue Care Due:  None identified     Medication Therapy Plan: Drug-Disease: lisinopriL and Transaminitis; LCO- patient taking 40mg daily  and 20mg in the evening (12/29/22); Rosuvastatin dc'd (5/19/23)      Pharmacist review requested: Yes     Appointments  past 12m or future 3m with PCP    Date Provider   Last Visit   12/15/2022 Juan Daniel Long MD   Next Visit   Visit date not found Juan Daniel Long MD   ED visits in past 90 days: 0        Note composed:12:07 PM 07/12/2023

## 2023-07-12 NOTE — TELEPHONE ENCOUNTER
No care due was identified.  [facility] Embedded Care Due Messages. Reference number: [id number].   7/12/2023 12:19:57 AM CDT

## 2023-07-13 ENCOUNTER — LAB VISIT (OUTPATIENT)
Dept: LAB | Facility: HOSPITAL | Age: 53
End: 2023-07-13
Attending: PHYSICIAN ASSISTANT
Payer: MEDICAID

## 2023-07-13 DIAGNOSIS — E11.9 TYPE 2 DIABETES MELLITUS WITHOUT COMPLICATION, WITHOUT LONG-TERM CURRENT USE OF INSULIN: ICD-10-CM

## 2023-07-13 DIAGNOSIS — Z12.5 SCREENING FOR PROSTATE CANCER: ICD-10-CM

## 2023-07-13 LAB
ALBUMIN SERPL BCP-MCNC: 3.5 G/DL (ref 3.5–5.2)
ALP SERPL-CCNC: 137 U/L (ref 55–135)
ALT SERPL W/O P-5'-P-CCNC: 92 U/L (ref 10–44)
ANION GAP SERPL CALC-SCNC: 16 MMOL/L (ref 8–16)
AST SERPL-CCNC: 146 U/L (ref 10–40)
BILIRUB SERPL-MCNC: 1.8 MG/DL (ref 0.1–1)
BUN SERPL-MCNC: 7 MG/DL (ref 6–20)
CALCIUM SERPL-MCNC: 9.5 MG/DL (ref 8.7–10.5)
CHLORIDE SERPL-SCNC: 96 MMOL/L (ref 95–110)
CO2 SERPL-SCNC: 23 MMOL/L (ref 23–29)
COMPLEXED PSA SERPL-MCNC: 2.6 NG/ML (ref 0–4)
CREAT SERPL-MCNC: 0.9 MG/DL (ref 0.5–1.4)
EST. GFR  (NO RACE VARIABLE): >60 ML/MIN/1.73 M^2
ESTIMATED AVG GLUCOSE: 232 MG/DL (ref 68–131)
GLUCOSE SERPL-MCNC: 317 MG/DL (ref 70–110)
HBA1C MFR BLD: 9.7 % (ref 4–5.6)
POTASSIUM SERPL-SCNC: 4.3 MMOL/L (ref 3.5–5.1)
PROT SERPL-MCNC: 8 G/DL (ref 6–8.4)
SODIUM SERPL-SCNC: 135 MMOL/L (ref 136–145)

## 2023-07-13 PROCEDURE — 80053 COMPREHEN METABOLIC PANEL: CPT | Performed by: PHYSICIAN ASSISTANT

## 2023-07-13 PROCEDURE — 84153 ASSAY OF PSA TOTAL: CPT | Performed by: PHYSICIAN ASSISTANT

## 2023-07-13 PROCEDURE — 36415 COLL VENOUS BLD VENIPUNCTURE: CPT | Performed by: PHYSICIAN ASSISTANT

## 2023-07-13 PROCEDURE — 83036 HEMOGLOBIN GLYCOSYLATED A1C: CPT | Performed by: PHYSICIAN ASSISTANT

## 2023-07-14 ENCOUNTER — PATIENT MESSAGE (OUTPATIENT)
Dept: INTERNAL MEDICINE | Facility: CLINIC | Age: 53
End: 2023-07-14
Payer: MEDICAID

## 2023-07-14 DIAGNOSIS — E11.65 TYPE 2 DIABETES MELLITUS WITH HYPERGLYCEMIA, WITHOUT LONG-TERM CURRENT USE OF INSULIN: Primary | ICD-10-CM

## 2023-07-14 RX ORDER — LISINOPRIL 20 MG/1
TABLET ORAL
Qty: 90 TABLET | Refills: 1 | Status: SHIPPED | OUTPATIENT
Start: 2023-07-14

## 2023-07-14 RX ORDER — DULAGLUTIDE 4.5 MG/.5ML
4.5 INJECTION, SOLUTION SUBCUTANEOUS
Qty: 4 PEN | Refills: 6 | Status: SHIPPED | OUTPATIENT
Start: 2023-07-14 | End: 2024-01-26

## 2023-07-16 NOTE — TELEPHONE ENCOUNTER
No care due was identified.  [facility] Embedded Care Due Messages. Reference number: [id number].   7/16/2023 2:30:49 PM CDT

## 2023-07-17 RX ORDER — DULAGLUTIDE 3 MG/.5ML
INJECTION, SOLUTION SUBCUTANEOUS
Refills: 1 | OUTPATIENT
Start: 2023-07-17

## 2023-07-17 NOTE — TELEPHONE ENCOUNTER
Refill Decision Note   Saúl Perez  is requesting a refill authorization.  Brief Assessment and Rationale for Refill:  Quick Discontinue     Medication Therapy Plan:  Discontinued 7/13/23 by Gretel Modi PA-C- dose adjustment      Comments:     Note composed:1:01 PM 07/17/2023             Appointments     Last Visit   12/15/2022 Juan Daniel Long MD   Next Visit   Visit date not found Juan Daniel Long MD

## 2023-09-09 ENCOUNTER — PATIENT MESSAGE (OUTPATIENT)
Dept: INTERNAL MEDICINE | Facility: CLINIC | Age: 53
End: 2023-09-09
Payer: MEDICAID

## 2024-11-08 ENCOUNTER — PATIENT MESSAGE (OUTPATIENT)
Dept: ADMINISTRATIVE | Facility: HOSPITAL | Age: 54
End: 2024-11-08
Payer: MEDICAID

## 2024-11-08 ENCOUNTER — PATIENT OUTREACH (OUTPATIENT)
Dept: ADMINISTRATIVE | Facility: HOSPITAL | Age: 54
End: 2024-11-08
Payer: MEDICAID

## 2025-02-10 ENCOUNTER — PATIENT OUTREACH (OUTPATIENT)
Dept: ADMINISTRATIVE | Facility: HOSPITAL | Age: 55
End: 2025-02-10
Payer: MEDICAID

## 2025-03-06 ENCOUNTER — PATIENT MESSAGE (OUTPATIENT)
Dept: ADMINISTRATIVE | Facility: HOSPITAL | Age: 55
End: 2025-03-06
Payer: MEDICAID

## 2025-04-07 ENCOUNTER — PATIENT OUTREACH (OUTPATIENT)
Dept: ADMINISTRATIVE | Facility: HOSPITAL | Age: 55
End: 2025-04-07
Payer: MEDICAID

## 2025-05-06 ENCOUNTER — PATIENT OUTREACH (OUTPATIENT)
Dept: ADMINISTRATIVE | Facility: HOSPITAL | Age: 55
End: 2025-05-06
Payer: MEDICAID

## 2025-05-08 ENCOUNTER — PATIENT OUTREACH (OUTPATIENT)
Dept: ADMINISTRATIVE | Facility: HOSPITAL | Age: 55
End: 2025-05-08
Payer: MEDICAID